# Patient Record
Sex: FEMALE | Race: WHITE | NOT HISPANIC OR LATINO | Employment: FULL TIME | URBAN - METROPOLITAN AREA
[De-identification: names, ages, dates, MRNs, and addresses within clinical notes are randomized per-mention and may not be internally consistent; named-entity substitution may affect disease eponyms.]

---

## 2023-03-24 ENCOUNTER — TELEPHONE (OUTPATIENT)
Dept: PRIMARY CARE | Facility: CLINIC | Age: 38
End: 2023-03-24
Payer: COMMERCIAL

## 2023-03-24 DIAGNOSIS — F41.8 DEPRESSION WITH ANXIETY: Primary | ICD-10-CM

## 2023-03-24 PROBLEM — G43.909 MIGRAINE: Status: ACTIVE | Noted: 2023-03-24

## 2023-03-24 PROBLEM — M12.9 ARTHROPATHY: Status: ACTIVE | Noted: 2023-03-24

## 2023-03-24 PROBLEM — R51.9 DAILY HEADACHE: Status: ACTIVE | Noted: 2023-03-24

## 2023-03-24 PROBLEM — R53.83 FATIGUE: Status: ACTIVE | Noted: 2023-03-24

## 2023-03-24 PROBLEM — I10 BENIGN ESSENTIAL HYPERTENSION: Status: ACTIVE | Noted: 2023-03-24

## 2023-03-24 PROBLEM — D68.51 FACTOR V LEIDEN (MULTI): Status: ACTIVE | Noted: 2023-03-24

## 2023-03-24 PROBLEM — E03.9 HYPOTHYROIDISM, ADULT: Status: ACTIVE | Noted: 2023-03-24

## 2023-03-24 RX ORDER — BUPROPION HYDROCHLORIDE 150 MG/1
TABLET, EXTENDED RELEASE ORAL 2 TIMES DAILY
COMMUNITY
End: 2024-02-15 | Stop reason: ALTCHOICE

## 2023-03-24 RX ORDER — AMITRIPTYLINE HYDROCHLORIDE 10 MG/1
TABLET, FILM COATED ORAL
COMMUNITY
Start: 2022-01-19 | End: 2023-04-27

## 2023-03-24 RX ORDER — LEVOTHYROXINE SODIUM 150 UG/1
1 TABLET ORAL DAILY
COMMUNITY
Start: 2017-04-19 | End: 2023-07-11 | Stop reason: SDUPTHER

## 2023-03-24 RX ORDER — SUMATRIPTAN SUCCINATE 100 MG/1
TABLET ORAL
COMMUNITY
Start: 2022-01-19

## 2023-03-24 RX ORDER — ESCITALOPRAM OXALATE 20 MG/1
20 TABLET ORAL DAILY
Qty: 30 TABLET | Refills: 5 | Status: SHIPPED | OUTPATIENT
Start: 2023-03-24 | End: 2023-07-11 | Stop reason: SDUPTHER

## 2023-03-24 RX ORDER — PROPRANOLOL HYDROCHLORIDE 20 MG/1
1 TABLET ORAL 2 TIMES DAILY
COMMUNITY
Start: 2020-09-01 | End: 2024-02-15 | Stop reason: WASHOUT

## 2023-03-24 NOTE — TELEPHONE ENCOUNTER
Patient called and stated that she is having more headaches and nausea since starting the citalopram - she would like to try something else- I will switch to escitalopram

## 2023-04-27 ENCOUNTER — TELEMEDICINE (OUTPATIENT)
Dept: PRIMARY CARE | Facility: CLINIC | Age: 38
End: 2023-04-27
Payer: COMMERCIAL

## 2023-04-27 DIAGNOSIS — K12.1 MOUTH ULCERS: Primary | ICD-10-CM

## 2023-04-27 DIAGNOSIS — J03.90 ACUTE TONSILLITIS, UNSPECIFIED ETIOLOGY: ICD-10-CM

## 2023-04-27 PROCEDURE — 99213 OFFICE O/P EST LOW 20 MIN: CPT | Performed by: NURSE PRACTITIONER

## 2023-04-27 RX ORDER — VALACYCLOVIR HYDROCHLORIDE 1 G/1
1000 TABLET, FILM COATED ORAL 3 TIMES DAILY
Qty: 9 TABLET | Refills: 0 | Status: SHIPPED | OUTPATIENT
Start: 2023-04-27 | End: 2023-04-30

## 2023-04-27 RX ORDER — PREDNISONE 20 MG/1
TABLET ORAL
Qty: 18 TABLET | Refills: 0 | Status: SHIPPED | OUTPATIENT
Start: 2023-04-27 | End: 2023-07-11 | Stop reason: ALTCHOICE

## 2023-04-27 RX ORDER — DOXYCYCLINE 100 MG/1
100 CAPSULE ORAL 2 TIMES DAILY
Qty: 28 CAPSULE | Refills: 0 | Status: SHIPPED | OUTPATIENT
Start: 2023-04-27 | End: 2023-05-11

## 2023-04-27 ASSESSMENT — ENCOUNTER SYMPTOMS
SHORTNESS OF BREATH: 0
TROUBLE SWALLOWING: 1
NERVOUS/ANXIOUS: 1
SORE THROAT: 1
FATIGUE: 1
PALPITATIONS: 0
HEADACHES: 1
ACTIVITY CHANGE: 1
FEVER: 1
CHEST TIGHTNESS: 1

## 2023-04-27 NOTE — PROGRESS NOTES
Subjective   Patient ID: Tiffanie Burdick is a 38 y.o. female who presents for No chief complaint on file..  HPI    She is calling in because she is feeling miserable   She started with and enlarged lymph node of the left side and the next day she felt that her tonsils were swollen and she hard a hard time swallowing   She continued to feel worse over the next few days   Multiple sores on the roof of her mouth    She thinks that she talked herself into a little panic attack went to the ER and all cardiac testing was normal   Then she went to urgent care and he told her they were ulcers and gave her valacyclovir    She is still pretty miserable with multiple sores on the tonsils and on the roof of her mouth         Review of Systems   Constitutional:  Positive for activity change, fatigue and fever.   HENT:  Positive for mouth sores, sore throat and trouble swallowing.    Respiratory:  Positive for chest tightness. Negative for shortness of breath.    Cardiovascular:  Positive for chest pain. Negative for palpitations and leg swelling.   Neurological:  Positive for headaches.   Psychiatric/Behavioral:  The patient is nervous/anxious.        Objective   Physical Exam  Constitutional:       Appearance: Normal appearance.   HENT:      Head: Normocephalic.      Mouth/Throat:      Mouth: Oral lesions present.      Palate: Lesions present.      Pharynx: Uvula midline.     Pulmonary:      Effort: Pulmonary effort is normal.      Breath sounds: Normal breath sounds.   Lymphadenopathy:      Head:      Left side of head: Submandibular adenopathy present.   Neurological:      Mental Status: She is alert.   Psychiatric:         Mood and Affect: Mood normal.      Comments: Somewhat anxious          Assessment/Plan   Mouth Sores  Continue with the Valacyclovir that you were given and I am also giving you a steroid dose   As your sinuses have been having a lot of pressure and you have some PND I am sending over an antibiotic

## 2023-07-11 DIAGNOSIS — E03.9 HYPOTHYROIDISM, ADULT: Primary | ICD-10-CM

## 2023-07-11 DIAGNOSIS — F41.8 DEPRESSION WITH ANXIETY: ICD-10-CM

## 2023-07-11 RX ORDER — LEVOTHYROXINE SODIUM 150 UG/1
150 TABLET ORAL DAILY
Qty: 90 TABLET | Refills: 3 | Status: SHIPPED | OUTPATIENT
Start: 2023-07-11 | End: 2024-02-26 | Stop reason: WASHOUT

## 2023-07-11 RX ORDER — ESCITALOPRAM OXALATE 20 MG/1
20 TABLET ORAL DAILY
Qty: 90 TABLET | Refills: 3 | Status: SHIPPED | OUTPATIENT
Start: 2023-07-11 | End: 2024-02-15 | Stop reason: WASHOUT

## 2023-07-13 DIAGNOSIS — B34.9 VIRAL SYNDROME: ICD-10-CM

## 2023-07-13 DIAGNOSIS — R21 FACIAL RASH: Primary | ICD-10-CM

## 2023-07-13 DIAGNOSIS — K13.79 MOUTH SORES: ICD-10-CM

## 2023-07-13 RX ORDER — PREDNISONE 10 MG/1
TABLET ORAL
Qty: 30 TABLET | Refills: 0 | Status: SHIPPED | OUTPATIENT
Start: 2023-07-13 | End: 2023-07-23

## 2023-07-13 RX ORDER — DOXYCYCLINE 100 MG/1
100 CAPSULE ORAL 2 TIMES DAILY
Qty: 28 CAPSULE | Refills: 0 | Status: SHIPPED | OUTPATIENT
Start: 2023-07-13 | End: 2023-07-27

## 2023-07-13 RX ORDER — VALACYCLOVIR HYDROCHLORIDE 1 G/1
1000 TABLET, FILM COATED ORAL 2 TIMES DAILY
COMMUNITY
Start: 2023-04-26 | End: 2023-07-13 | Stop reason: SDUPTHER

## 2023-07-13 RX ORDER — VALACYCLOVIR HYDROCHLORIDE 1 G/1
TABLET, FILM COATED ORAL
Qty: 20 TABLET | Refills: 1 | Status: SHIPPED | OUTPATIENT
Start: 2023-07-13 | End: 2024-02-15 | Stop reason: WASHOUT

## 2023-07-14 ENCOUNTER — LAB (OUTPATIENT)
Dept: LAB | Facility: LAB | Age: 38
End: 2023-07-14
Payer: COMMERCIAL

## 2023-07-14 DIAGNOSIS — B34.9 VIRAL SYNDROME: ICD-10-CM

## 2023-07-14 DIAGNOSIS — R21 FACIAL RASH: ICD-10-CM

## 2023-07-14 PROCEDURE — 86140 C-REACTIVE PROTEIN: CPT

## 2023-07-14 PROCEDURE — 86665 EPSTEIN-BARR CAPSID VCA: CPT

## 2023-07-14 PROCEDURE — 85652 RBC SED RATE AUTOMATED: CPT

## 2023-07-14 PROCEDURE — 86225 DNA ANTIBODY NATIVE: CPT

## 2023-07-14 PROCEDURE — 86038 ANTINUCLEAR ANTIBODIES: CPT

## 2023-07-14 PROCEDURE — 86235 NUCLEAR ANTIGEN ANTIBODY: CPT

## 2023-07-14 PROCEDURE — 86664 EPSTEIN-BARR NUCLEAR ANTIGEN: CPT

## 2023-07-14 PROCEDURE — 86663 EPSTEIN-BARR ANTIBODY: CPT

## 2023-07-14 PROCEDURE — 83615 LACTATE (LD) (LDH) ENZYME: CPT

## 2023-07-14 PROCEDURE — 36415 COLL VENOUS BLD VENIPUNCTURE: CPT

## 2023-07-14 PROCEDURE — 86039 ANTINUCLEAR ANTIBODIES (ANA): CPT

## 2023-07-15 LAB
C REACTIVE PROTEIN (MG/L) IN SER/PLAS: 0.4 MG/DL
EBV INTERPRETATION: ABNORMAL
EPSTEIN-BARR VCA IGG: POSITIVE
EPSTEIN-BARR VCA IGM: NEGATIVE
EPSTEIN-BARR VIRUS EARLY ANTIGEN ANTIBODY, IGG: NEGATIVE
EPSTIEN-BARR NUCLEAR ANTIGEN AB: POSITIVE
LACTATE DEHYDROGENASE (U/L) IN SER/PLAS BY LAC->PYR RXN: 190 U/L (ref 84–246)
SEDIMENTATION RATE, ERYTHROCYTE: 3 MM/H (ref 0–20)

## 2023-07-17 LAB
ANA PATTERN: ABNORMAL
ANA TITER: ABNORMAL
ANTI-CENTROMERE: <0.2 AI
ANTI-CHROMATIN: <0.2 AI
ANTI-DNA (DS): <1 IU/ML
ANTI-JO-1 IGG: <0.2 AI
ANTI-NUCLEAR ANTIBODY (ANA): POSITIVE
ANTI-RIBOSOMAL P: <0.2 AI
ANTI-RNP: 0.2 AI
ANTI-SCL-70: <0.2 AI
ANTI-SM/RNP: <0.2 AI
ANTI-SM: <0.2 AI
ANTI-SSA: <0.2 AI
ANTI-SSB: <0.2 AI

## 2023-07-17 NOTE — RESULT ENCOUNTER NOTE
ESPINOZA was reported as positive with a titer of 1:160. You have a normal sed rate. This is most likely a false positive, but if concerned about a rheumatological condition, we can refer to rheumatology. Let us know and will write order.

## 2023-07-17 NOTE — RESULT ENCOUNTER NOTE
Labwork back and labwork looked pretty good. Markers for inflammation negative, and it showed you had mono in the past. Otherwise nothing is abnormal.

## 2024-02-06 ENCOUNTER — APPOINTMENT (OUTPATIENT)
Dept: PRIMARY CARE | Facility: CLINIC | Age: 39
End: 2024-02-06
Payer: COMMERCIAL

## 2024-02-15 ENCOUNTER — HOSPITAL ENCOUNTER (OUTPATIENT)
Dept: RADIOLOGY | Facility: CLINIC | Age: 39
Discharge: HOME | End: 2024-02-15
Payer: COMMERCIAL

## 2024-02-15 ENCOUNTER — OFFICE VISIT (OUTPATIENT)
Dept: PRIMARY CARE | Facility: CLINIC | Age: 39
End: 2024-02-15
Payer: COMMERCIAL

## 2024-02-15 VITALS
HEART RATE: 84 BPM | TEMPERATURE: 98.2 F | DIASTOLIC BLOOD PRESSURE: 78 MMHG | HEIGHT: 67 IN | SYSTOLIC BLOOD PRESSURE: 126 MMHG | OXYGEN SATURATION: 96 % | BODY MASS INDEX: 41.75 KG/M2 | WEIGHT: 266 LBS

## 2024-02-15 DIAGNOSIS — Z76.89 ENCOUNTER TO ESTABLISH CARE WITH NEW DOCTOR: ICD-10-CM

## 2024-02-15 DIAGNOSIS — R05.2 SUBACUTE COUGH: ICD-10-CM

## 2024-02-15 DIAGNOSIS — E66.01 MORBID OBESITY (MULTI): ICD-10-CM

## 2024-02-15 DIAGNOSIS — E03.9 HYPOTHYROIDISM, UNSPECIFIED TYPE: ICD-10-CM

## 2024-02-15 DIAGNOSIS — Z11.3 ROUTINE SCREENING FOR STI (SEXUALLY TRANSMITTED INFECTION): Primary | ICD-10-CM

## 2024-02-15 DIAGNOSIS — Z13.1 DIABETES MELLITUS SCREENING: ICD-10-CM

## 2024-02-15 DIAGNOSIS — Z13.220 NEED FOR LIPID SCREENING: ICD-10-CM

## 2024-02-15 DIAGNOSIS — I10 BENIGN ESSENTIAL HYPERTENSION: ICD-10-CM

## 2024-02-15 PROCEDURE — 71046 X-RAY EXAM CHEST 2 VIEWS: CPT | Performed by: RADIOLOGY

## 2024-02-15 PROCEDURE — 99204 OFFICE O/P NEW MOD 45 MIN: CPT | Performed by: STUDENT IN AN ORGANIZED HEALTH CARE EDUCATION/TRAINING PROGRAM

## 2024-02-15 PROCEDURE — 3074F SYST BP LT 130 MM HG: CPT | Performed by: STUDENT IN AN ORGANIZED HEALTH CARE EDUCATION/TRAINING PROGRAM

## 2024-02-15 PROCEDURE — 99385 PREV VISIT NEW AGE 18-39: CPT | Performed by: STUDENT IN AN ORGANIZED HEALTH CARE EDUCATION/TRAINING PROGRAM

## 2024-02-15 PROCEDURE — 1036F TOBACCO NON-USER: CPT | Performed by: STUDENT IN AN ORGANIZED HEALTH CARE EDUCATION/TRAINING PROGRAM

## 2024-02-15 PROCEDURE — 3078F DIAST BP <80 MM HG: CPT | Performed by: STUDENT IN AN ORGANIZED HEALTH CARE EDUCATION/TRAINING PROGRAM

## 2024-02-15 PROCEDURE — 71046 X-RAY EXAM CHEST 2 VIEWS: CPT

## 2024-02-15 RX ORDER — LOSARTAN POTASSIUM 25 MG/1
25 TABLET ORAL DAILY
Qty: 30 TABLET | Refills: 0 | Status: SHIPPED | OUTPATIENT
Start: 2024-02-15 | End: 2024-04-03 | Stop reason: SDUPTHER

## 2024-02-15 ASSESSMENT — PATIENT HEALTH QUESTIONNAIRE - PHQ9
SUM OF ALL RESPONSES TO PHQ9 QUESTIONS 1 AND 2: 0
1. LITTLE INTEREST OR PLEASURE IN DOING THINGS: NOT AT ALL
2. FEELING DOWN, DEPRESSED OR HOPELESS: NOT AT ALL

## 2024-02-15 NOTE — PROGRESS NOTES
Tiffanie Burdick is a 38 y.o. female who is presenting for annual physical exam.   She is complaining about a cough with sputum(greenish color).  The cough has been going on for the past 4 weeks.  She had runny nose tiredness, wheezing at night, but denied shortness of breath.  Tenderness of the of the chest especially with deep breath and and is on the right side.   She has a close contact with sick person, her kids got tested positive for flu B.  Last  Visit:   Reported Health: Fair    Dental, Vision, Hearing:  Regular dental visits: yes  - Brushes teeth 2 times per day  - Flosses? yes  Vision problems: no  - Wears glasses or contacts? no  - Last eye exam: 2023  Hearing loss: no    Immunization status:  Up to date: yes    Lifestyle:  Healthy diet: yes  Regular exercise: yes  - Exercise frequency:   - Type of exercise: fit on bhaskar  Alcohol: no  Tobacco: no  Drugs: no    Reproductive Health:  Menstrual problems: no  - LMP: 24  Sexually active: yes  Contraception: vasectomy    Pregnancy History:   : 6  Parity:   - Full term: 6  - Premature:  - (s):  - Living:  - AB Induced:  - AB Spont:1  - Ectopic:   - Multiple births:    Cervical Cancer Screening:  Last pap: last year  History of abnormal pap smear? no  Breast Cancer Screening:  Last mammogram: no yet  Colorectal Cancer Screening:  Last colonoscopy or Cologuard: no at the age and no fhx of colon cancer  Metabolic Screening:  Lipid profile: january  Glucose screen: 2024    Review of Systems  Gen: denies fever, chills, weight loss, fatigue  HEENT: denies sinus pressure, sinus congestion, runny nose, red eyes, itchy eyes, vision loss, ear pain, hearing loss, throat pain, trouble swallowing  Neck: denies neck pain, neck swelling or masses  Chest/breast: denies breast pain, breast lumps, nipple discharge  CV: denies chest pain, palpitations, fast heart rate, syncope  Resp: denies shortness of breath, cough, wheezing  GI: denies  abdominal pain, nausea, diarrhea, constipation, hematochezia, melena  : denies dysuria, hematuria, vaginal/penile discharge, frequency  Endo: denies polydipsia, polyuria, heat/cold intolerance, weight change, hair thinning  Heme: denies easy bruising, easy bleeding  Neuro: denies headache, numbness, tingling, memory loss, changes in vision  MSK: denies joint pain, joint swelling, weakness  Psych: denies suicidal ideation, homicidal ideation, depression, anxiety, mood swings  Skin: denies rashes, abnormal lesions, itching, changes in moles     Previous History  Past Medical History:   Diagnosis Date    Personal history of other diseases of the nervous system and sense organs     History of migraine     Past Surgical History:   Procedure Laterality Date    OTHER SURGICAL HISTORY  04/19/2017    Cholecystotomy    OTHER SURGICAL HISTORY  03/07/2022    Appendectomy    OTHER SURGICAL HISTORY  03/07/2022    Kidney surgery     Social History     Tobacco Use    Smoking status: Never    Smokeless tobacco: Never     No family history on file.  Allergies   Allergen Reactions    Amitriptyline Confusion    Ampicillin Hives    Reglan [Metoclopramide Hcl] Confusion     Current Outpatient Medications   Medication Instructions    levothyroxine (SYNTHROID) 150 mcg, oral, Daily    propranolol (Inderal) 20 mg tablet 1 tablet, oral, 2 times daily    SUMAtriptan (Imitrex) 100 mg tablet oral    valACYclovir (Valtrex) 1 gram tablet Take 1 tablet two times daily -  by 12 hours and will full glass of water at first sign for 1 day       Physical Exam  General: Alert and oriented, in no acute distress. Appears stated age, overweight, well-nourished, and well hydrated  HEENT:  - Head: Normocephalic and atraumatic   - Eyes: EOMI, PERRLA  - ENT: Hearing grossly intact. Mucus membranes pink and moist without lesions. Tonsils present without swelling or exudates. Good dentition. TMs gray  Neck: Supple. No stiffness. No thyromegaly or  thyroid nodules  Heart: RRR. No murmurs, clicks, or rubs  Lungs: Unlabored breathing. CTAB with no crackles, wheezes, or rhonchi  Abdomen: Normal BS in all 4 quadrants. Soft, non-tender, non-distended, with no masses  Extremities: Warm and well perfused. No edema. Normal peripheral pulses  Musculoskeletal: ROM intact. Strength 5/5 in BUE and BLE. No joint swelling. Normal gait and station  Neurological: Alert and oriented. No gross neurological deficits. Normal sensation. No weakness. DTRs +2/4   Psychological: Appropriate mood and affect  Skin: No rash, abnormal lesions, cyanosis, or erythema      Assessment and Plan   38 years old female with past medical history of hypertensive, hypothyroidism, migraine who presented today to the office for establish care with a new provider    Annual Physical  -She is up-to-date with her immunization, last Tdap was in 2016  -Order labs CBC, CMP, TSH, A1c lipid panel  -She is up-to-date with Pap smear      # Cough due to pleuritis, bronchitis pneumonia  -patient had prednisone and flonase which she mention is a little better but not resolved.  -Order chest x-ray  -If chest x-ray is normal supportive treatment and follow-up would be recommendation    #hypothyroidism   -Has been taking half of the tablet 150 mcg(so 75 mcg) for the past 2 weeks  -ordered TSH  -Based on the TSH result we will give the medication    # Hypertension  -Patient is taking propranolol for her blood pressure.  -Discontinue propranolol and start losartan 25 mg    # Overweight  -BMI 41  -Patient has been trying diet and exercise in the past but did not work for her  -Bariatric surgery referral    RTC within 4 weeks for her blood pressure and reassess the cough.    I discussed my plan with my attending, Dr. Gonzalez.    Roxane Bang  Family medicine resident  PGY2

## 2024-02-16 ENCOUNTER — APPOINTMENT (OUTPATIENT)
Dept: PRIMARY CARE | Facility: CLINIC | Age: 39
End: 2024-02-16
Payer: COMMERCIAL

## 2024-02-23 ENCOUNTER — LAB (OUTPATIENT)
Dept: LAB | Facility: LAB | Age: 39
End: 2024-02-23
Payer: COMMERCIAL

## 2024-02-23 DIAGNOSIS — Z13.220 NEED FOR LIPID SCREENING: ICD-10-CM

## 2024-02-23 DIAGNOSIS — Z11.3 ROUTINE SCREENING FOR STI (SEXUALLY TRANSMITTED INFECTION): ICD-10-CM

## 2024-02-23 DIAGNOSIS — Z13.1 DIABETES MELLITUS SCREENING: ICD-10-CM

## 2024-02-23 DIAGNOSIS — Z76.89 ENCOUNTER TO ESTABLISH CARE WITH NEW DOCTOR: ICD-10-CM

## 2024-02-23 DIAGNOSIS — E03.9 HYPOTHYROIDISM, UNSPECIFIED TYPE: ICD-10-CM

## 2024-02-23 LAB
ALBUMIN SERPL BCP-MCNC: 4.5 G/DL (ref 3.4–5)
ALP SERPL-CCNC: 45 U/L (ref 33–110)
ALT SERPL W P-5'-P-CCNC: 24 U/L (ref 7–45)
ANION GAP SERPL CALC-SCNC: 12 MMOL/L (ref 10–20)
AST SERPL W P-5'-P-CCNC: 20 U/L (ref 9–39)
BILIRUB SERPL-MCNC: 0.7 MG/DL (ref 0–1.2)
BUN SERPL-MCNC: 11 MG/DL (ref 6–23)
CALCIUM SERPL-MCNC: 9.2 MG/DL (ref 8.6–10.3)
CHLORIDE SERPL-SCNC: 104 MMOL/L (ref 98–107)
CHOLEST SERPL-MCNC: 164 MG/DL (ref 0–199)
CHOLESTEROL/HDL RATIO: 3.4
CO2 SERPL-SCNC: 27 MMOL/L (ref 21–32)
CREAT SERPL-MCNC: 0.81 MG/DL (ref 0.5–1.05)
EGFRCR SERPLBLD CKD-EPI 2021: >90 ML/MIN/1.73M*2
ERYTHROCYTE [DISTWIDTH] IN BLOOD BY AUTOMATED COUNT: 12.3 % (ref 11.5–14.5)
EST. AVERAGE GLUCOSE BLD GHB EST-MCNC: 91 MG/DL
GLUCOSE SERPL-MCNC: 86 MG/DL (ref 74–99)
HBA1C MFR BLD: 4.8 %
HCT VFR BLD AUTO: 45.3 % (ref 36–46)
HCV AB SER QL: NONREACTIVE
HDLC SERPL-MCNC: 48.2 MG/DL
HGB BLD-MCNC: 15.1 G/DL (ref 12–16)
HIV 1+2 AB+HIV1 P24 AG SERPL QL IA: NONREACTIVE
LDLC SERPL CALC-MCNC: 90 MG/DL
MCH RBC QN AUTO: 30.8 PG (ref 26–34)
MCHC RBC AUTO-ENTMCNC: 33.3 G/DL (ref 32–36)
MCV RBC AUTO: 92 FL (ref 80–100)
NON HDL CHOLESTEROL: 116 MG/DL (ref 0–149)
NRBC BLD-RTO: 0 /100 WBCS (ref 0–0)
PLATELET # BLD AUTO: 268 X10*3/UL (ref 150–450)
POTASSIUM SERPL-SCNC: 4.5 MMOL/L (ref 3.5–5.3)
PROT SERPL-MCNC: 6.8 G/DL (ref 6.4–8.2)
RBC # BLD AUTO: 4.9 X10*6/UL (ref 4–5.2)
SODIUM SERPL-SCNC: 138 MMOL/L (ref 136–145)
TRIGL SERPL-MCNC: 130 MG/DL (ref 0–149)
TSH SERPL-ACNC: 7.77 MIU/L (ref 0.44–3.98)
VLDL: 26 MG/DL (ref 0–40)
WBC # BLD AUTO: 7 X10*3/UL (ref 4.4–11.3)

## 2024-02-23 PROCEDURE — 85027 COMPLETE CBC AUTOMATED: CPT

## 2024-02-23 PROCEDURE — 87389 HIV-1 AG W/HIV-1&-2 AB AG IA: CPT

## 2024-02-23 PROCEDURE — 80053 COMPREHEN METABOLIC PANEL: CPT

## 2024-02-23 PROCEDURE — 84443 ASSAY THYROID STIM HORMONE: CPT

## 2024-02-23 PROCEDURE — 36415 COLL VENOUS BLD VENIPUNCTURE: CPT

## 2024-02-23 PROCEDURE — 80061 LIPID PANEL: CPT

## 2024-02-23 PROCEDURE — 86803 HEPATITIS C AB TEST: CPT

## 2024-02-23 PROCEDURE — 83036 HEMOGLOBIN GLYCOSYLATED A1C: CPT

## 2024-02-25 DIAGNOSIS — E03.9 HYPOTHYROIDISM, UNSPECIFIED TYPE: Primary | ICD-10-CM

## 2024-02-26 ENCOUNTER — TELEPHONE (OUTPATIENT)
Dept: PRIMARY CARE | Facility: CLINIC | Age: 39
End: 2024-02-26
Payer: COMMERCIAL

## 2024-02-26 RX ORDER — LEVOTHYROXINE SODIUM 75 UG/1
75 TABLET ORAL DAILY
Qty: 60 TABLET | Refills: 0 | Status: SHIPPED | OUTPATIENT
Start: 2024-02-26 | End: 2024-04-05 | Stop reason: WASHOUT

## 2024-02-26 NOTE — TELEPHONE ENCOUNTER
Result Communication    Resulted Orders   CBC   Result Value Ref Range    WBC 7.0 4.4 - 11.3 x10*3/uL    nRBC 0.0 0.0 - 0.0 /100 WBCs    RBC 4.90 4.00 - 5.20 x10*6/uL    Hemoglobin 15.1 12.0 - 16.0 g/dL    Hematocrit 45.3 36.0 - 46.0 %    MCV 92 80 - 100 fL    MCH 30.8 26.0 - 34.0 pg    MCHC 33.3 32.0 - 36.0 g/dL    RDW 12.3 11.5 - 14.5 %    Platelets 268 150 - 450 x10*3/uL   Comprehensive Metabolic Panel   Result Value Ref Range    Glucose 86 74 - 99 mg/dL    Sodium 138 136 - 145 mmol/L    Potassium 4.5 3.5 - 5.3 mmol/L    Chloride 104 98 - 107 mmol/L    Bicarbonate 27 21 - 32 mmol/L    Anion Gap 12 10 - 20 mmol/L    Urea Nitrogen 11 6 - 23 mg/dL    Creatinine 0.81 0.50 - 1.05 mg/dL    eGFR >90 >60 mL/min/1.73m*2      Comment:      Calculations of estimated GFR are performed using the 2021 CKD-EPI Study Refit equation without the race variable for the IDMS-Traceable creatinine methods.  https://jasn.asnjournals.org/content/early/2021/09/22/ASN.6827447725    Calcium 9.2 8.6 - 10.3 mg/dL    Albumin 4.5 3.4 - 5.0 g/dL    Alkaline Phosphatase 45 33 - 110 U/L    Total Protein 6.8 6.4 - 8.2 g/dL    AST 20 9 - 39 U/L    Bilirubin, Total 0.7 0.0 - 1.2 mg/dL    ALT 24 7 - 45 U/L      Comment:      Patients treated with Sulfasalazine may generate falsely decreased results for ALT.   Lipid Panel   Result Value Ref Range    Cholesterol 164 0 - 199 mg/dL      Comment:            Age      Desirable   Borderline High   High     0-19 Y     0 - 169       170 - 199     >/= 200    20-24 Y     0 - 189       190 - 224     >/= 225         >24 Y     0 - 199       200 - 239     >/= 240   **All ranges are based on fasting samples. Specific   therapeutic targets will vary based on patient-specific   cardiac risk.    Pediatric guidelines reference:Pediatrics 2011, 128(S5).Adult guidelines reference: NCEP ATPIII Guidelines,YAZ 2001, 258:2486-97    Venipuncture immediately after or during the administration of Metamizole may lead to falsely  low results. Testing should be performed immediately prior to Metamizole dosing.    HDL-Cholesterol 48.2 mg/dL      Comment:        Age       Very Low   Low     Normal    High    0-19 Y    < 35      < 40     40-45     ----  20-24 Y    ----     < 40      >45      ----        >24 Y      ----     < 40     40-60      >60      Cholesterol/HDL Ratio 3.4       Comment:        Ref Values  Desirable  < 3.4  High Risk  > 5.0    LDL Calculated 90 <=99 mg/dL      Comment:                                  Near   Borderline      AGE      Desirable  Optimal    High     High     Very High     0-19 Y     0 - 109     ---    110-129   >/= 130     ----    20-24 Y     0 - 119     ---    120-159   >/= 160     ----      >24 Y     0 -  99   100-129  130-159   160-189     >/=190      VLDL 26 0 - 40 mg/dL    Triglycerides 130 0 - 149 mg/dL      Comment:         Age         Desirable   Borderline High   High     Very High   0 D-90 D    19 - 174         ----         ----        ----  91 D- 9 Y     0 -  74        75 -  99     >/= 100      ----    10-19 Y     0 -  89        90 - 129     >/= 130      ----    20-24 Y     0 - 114       115 - 149     >/= 150      ----         >24 Y     0 - 149       150 - 199    200- 499    >/= 500    Venipuncture immediately after or during the administration of Metamizole may lead to falsely low results. Testing should be performed immediately prior to Metamizole dosing.    Non HDL Cholesterol 116 0 - 149 mg/dL      Comment:            Age       Desirable   Borderline High   High     Very High     0-19 Y     0 - 119       120 - 144     >/= 145    >/= 160    20-24 Y     0 - 149       150 - 189     >/= 190      ----         >24 Y    30 mg/dL above LDL Cholesterol goal     Hepatitis C Antibody   Result Value Ref Range    Hepatitis C AB Nonreactive Nonreactive      Comment:      Results from patients taking biotin supplements or receiving high-dose biotin therapy should be interpreted with caution due to possible  interference with this test. Providers may contact their local laboratory for further information.   HIV 1/2 Antigen/Antibody Screen with Reflex to Confirmation   Result Value Ref Range    HIV 1/2 Antigen/Antibody Screen with Reflex to Confirmation Nonreactive Nonreactive    Narrative    HIV Ag/Ab screen is performed using the Siemens [employer] Medical HIV Ag/Ab Combo assay which detects the presence of HIV p24 antigen as well as antibodies to HIV-1 (Group M and O) and HIV-2.  No laboratory evidence of HIV infection. If acute HIV infection is suspected, consider testing for HIV RNA by PCR (viral load).   Thyroid Stimulating Hormone   Result Value Ref Range    Thyroid Stimulating Hormone 7.77 (H) 0.44 - 3.98 mIU/L    Narrative    TSH testing is performed using different testing methodology at [facility] than at other [facility]. Direct result comparisons should only be made within the same method.     Hemoglobin A1C   Result Value Ref Range    Hemoglobin A1C 4.8 see below %    Estimated Average Glucose 91 Not Established mg/dL    Narrative    Diagnosis of Diabetes-Adults  Non-Diabetic: < or = 5.6%  Increased risk for developing diabetes: 5.7-6.4%  Diagnostic of diabetes: > or = 6.5%    Monitoring of Diabetes  Age (y)....................... Therapeutic Goal (%)  Adults: >18.........................<7.0  Pediatrics: 13-18...................<7.5  Pediatrics: 7-12....................<8.0  Pediatrics: 0-6..................... 7.5-8.5    American Diabetes Association. Diabetes Care 33(S1), Jan 2010           10:27 AM    Discussed results with patient. TSH is elevated. She admits to having only taken Synthroid 75mcg for about two weeks prior, therefore it is difficult to gauge if the 75mcg is the proper dose. She does have a history of having too much Synthroid one year ago. After extensive discussion, patient is agreeable to restarting Synthroid 75mcg for 6 weeks and recheck TSH at that time. If TSH is still  elevated, we will slowly titrate dose to avoid putting patient in hyperthyroid state.    Results were successfully communicated with the patient and they acknowledged their understanding.    Yaw Gonzalez MD

## 2024-02-28 ENCOUNTER — TELEPHONE (OUTPATIENT)
Dept: SURGERY | Facility: CLINIC | Age: 39
End: 2024-02-28
Payer: COMMERCIAL

## 2024-02-28 NOTE — TELEPHONE ENCOUNTER
Patient returned RN's call in regards to forms needing filled out to begin the bariatric process. Patient stated that she filled them out in October, but would be willing to fill out again now. RN educated patient to go ahead and fill out the forms and the insurance verification team would be made aware to look for her forms. Patient verbalized understanding and stated that she was grateful the someone reached out.

## 2024-02-28 NOTE — TELEPHONE ENCOUNTER
This RN received message from Dr. Kwok stating that patient has not been contacted about scheduling for bariatric surgery. RN was unable to find questionnaire that should be filled out prior to insurance verification. RN left voicemail for patient to call RN back to go over how to find questionnaire.

## 2024-02-28 NOTE — TELEPHONE ENCOUNTER
Hi,   She reached out to Dr. Kwok via Facebook messenger and asked why she hadn't heard back yet. Dr. Kwok brought it to Padmini and my attention. That is when I called the patient and she said that she would fill out the form online for the bariatric program.

## 2024-02-29 ENCOUNTER — TELEPHONE (OUTPATIENT)
Dept: SURGERY | Facility: CLINIC | Age: 39
End: 2024-02-29
Payer: COMMERCIAL

## 2024-02-29 NOTE — TELEPHONE ENCOUNTER
Spoke to [employer] 2/28 Spoke to Geovanny Ref # [id number]- While doing insurance verification:   Question  Is [employer] is required: answer was yes  Asked if each facility was covered under [employer]- NPI # given: answer yes  Checked if Vish Israel, Rissa Klein- gave NPI #: answer yes  Patient scheduled 3/6 for Virtual NPV with RD                                3/20 Dr Kwok @ 2pm  Class at 12- Patient aware

## 2024-03-06 ENCOUNTER — NUTRITION (OUTPATIENT)
Dept: SURGERY | Facility: CLINIC | Age: 39
End: 2024-03-06
Payer: COMMERCIAL

## 2024-03-06 VITALS — HEIGHT: 67 IN | WEIGHT: 264 LBS | BODY MASS INDEX: 41.44 KG/M2

## 2024-03-06 NOTE — PROGRESS NOTES
"Initial Bariatric Nutrition Assessment    Surgeon:  Rissa   Patient is considering: Sleeve gastrectomy     ASSESSMENT:  Current weight:   Vitals:    03/06/24 1504   Weight: 120 kg (264 lb)     Ht:  1.702 m (5' 7\")   BMI:  Body mass index is 41.35 kg/m².        Pre-Op Excess Body Weight (EBW):   107lbs    Target Post-Op weight goal: 194.5-210.5lbs        This is a 39 y.o. female with morbid obesity (Body mass index is 41.35 kg/m².) who presents to clinic for consideration of bariatric surgery. she has attempted and failed multiple diet and exercise regimens for weight loss.   Initial Onset of obesity was  after her 6th pregnancy .  Their goal for surgery is to  be healthier  and lose weight. The patient has tried multiple diets to lose weight including  trim healthy mama, WW, exercise, portion control . The patient was most successful with the  trim healthy mama . The most pounds lost on this diet were 45 lbs. The patient considers their dietary weakness to be  feeling hungry all the time, fear of missing out with food, eating more than the portion size.  The patient reports a  highest weight ever of 264 pounds and lowest weight ever of ~150 pounds     Current diet: regular   The patient does not exercise.     Food allergies/intolerances:  no  Chewing/Swallowing/Dentition: no  Nausea / Vomiting / Hx Gastroparesis:  no  Diarrhea/ Constipation: no  Smoking/Tobacco use: no  Vitamins/Minerals supplements: no  Hours of sleep/night: 7 hours   Work: day shift     Medications:     Current Outpatient Medications:     levothyroxine (Synthroid, Levoxyl) 75 mcg tablet, Take 1 tablet (75 mcg) by mouth once daily., Disp: 60 tablet, Rfl: 0    losartan (Cozaar) 25 mg tablet, Take 1 tablet (25 mg) by mouth once daily., Disp: 30 tablet, Rfl: 0    SUMAtriptan (Imitrex) 100 mg tablet, Take by mouth., Disp: , Rfl:       24 HOUR RECALL/DIET HISTORY:  Breakfast:  premier shake or nothing   Snack:    Lunch: sandwich (PB and J) or leftover " dinner   Snack:   Dinner: meat potato and a veggie  Snack: pretzels or fruit  Beverages: coffee (trying to get off it), tea, pop (2x per week), carbonated water and lemon   Alcohol: special occasions     Person responsible for cooking & shopping?   Self   How often do you eat sweet snacks?   5x per week  How often do you eat savory snacks?  2-3x per week   How often do you eat out?  1x per week  Do you feel overly stuffed? yes  Binge Eating?  No- will monitor this for sure  Night Eating?  no  Emotional Eating?  Yes        READINESS TO LEARN:  Motivation to learn: Interested        Understanding of instruction:  Good      Anticipated Compliance: Good        Family Support: yes,  and parents           Educational Materials Provided:    Schedules for support group   1400  Calorie meal plan   Goals sheet    Nutrition assessment completed today.  Pt will be scheduled for video education class to discuss the 2 week pre op diet, post op protein and fluid goals, vitamin and mineral supplementation, exercise goals, and post op diet progression closer to the time of surgery    Patient is seeking sleeve gastrectomy    Instructed pt on a 1400 calorie meal plan and to measure and record intake daily. Advised to eat 3 meals per and 1-2 high protein snacks.  Recommend eating 3-4 oz per meal. Reviewed the postop behaviors to start practicing. Discussed ways to reduce emotional eating.  Set a goal to start doing exercise of choices for 3x per week for 30 minutes.     Patient was receptive to nutritional recommendations, asked numerous questions, and verbalized understanding of the weight loss surgery diet.  Patient expressed understanding about the importance of strict dietary compliance post-surgery to avoid nutritional deficiencies and achieve optimal weight loss and verbalized intent to follow dietary recommendations.    Malnutrition Screening:   Significant unintentional weight loss? n/a   Eating less than 75% of usual  intake for more than 2 weeks? n/a      Nutrition Diagnosis:   Overweight/obesity related to excess energy intake as evidenced by BMI >= 40 kg/m^2.  Food- and nutrition-related knowledge deficit related to lack of prior exposure to surgical weight loss information as evidenced by pt new to surgical program.    Nutrition Interventions:   Modify type and amount of food and nutrients within meals and snacks.  Comprehensive Nutrition Education    Recommendations:  1. Begin following your meal plan.  Measure and record intake daily.   2. Structure meal patterns, eating three meals and 1-2 snacks per day.  3. Aim for 3-4 oz protein per meal.  Have 1-2 high protein snacks that are 10-20 g protein each.  You can try a tuna or chicken packet, Greek yogurt, 2 string cheeses, Protein bars like Quest, Pure Protein, Premier, or Built Bars. you can also try protein chips form Quest or Atkins.    4. Drink 64oz of calorie-free, caffeine-free, and non-carbonated beverages. Practice taking sips and avoid straws.   5. Practice no drinking 30 minutes before meals, nothing with meals and wait 30 minutes after meals to drink again. Make meals last 30 minutes-chew thoroughly.   6. Limit or omit eating out/sweets/savory snacks to 1-2 times per week.  7. Begin daily multivitamin.  [employer] adult has everything you need.  8. Begin using your gym or exercise videos 3x per week 30 minutes. Increase physical activity by 10-15 minutes as tolerated to an end goal of 60 minutes 5 x per week. Consistency is the key.  9. Identify emotional eating. Only snack when you feel hungry.     Pre-op Goal weight: lose 5% of body weight    Nutrition Monitoring and Evaluation: 1-2 pound weight loss per week  Criteria: weight check  Need for Follow-up: one month     Patient does meet National Institutes Health guidelines for weight loss surgery, however needs to demonstrate consistent effort in making dietary changes before giving clearance. It is anticipated that  the patient will need at least 1-2 nutritional follow-up visits prior to clearance for surgery.    She Allen MS, RD, LD  Phone: [phone]

## 2024-03-19 ENCOUNTER — TELEPHONE (OUTPATIENT)
Dept: SURGERY | Facility: CLINIC | Age: 39
End: 2024-03-19
Payer: COMMERCIAL

## 2024-03-19 NOTE — TELEPHONE ENCOUNTER
Steve Moreno, just left you  message to confirm your scheduled visit with Dr. Kwok on 3/20/24 at [facility] (inside [facility], main floor in the Specialty Clinic).  Please ARRIVE AT:  11:45 AM for the required Noon to 1PM New Patient Class led by Dr. Kwok and Jessica RN Coordinator.   Parking is available at a cost of $5.00 at the Main Entrance.  We look forward to seeing you, Amrita Talamantes, [facility] Nurse.

## 2024-03-20 ENCOUNTER — TELEPHONE (OUTPATIENT)
Dept: SURGERY | Facility: CLINIC | Age: 39
End: 2024-03-20

## 2024-03-20 ENCOUNTER — OFFICE VISIT (OUTPATIENT)
Dept: SURGERY | Facility: CLINIC | Age: 39
End: 2024-03-20
Payer: COMMERCIAL

## 2024-03-20 ENCOUNTER — DOCUMENTATION (OUTPATIENT)
Dept: SURGERY | Facility: CLINIC | Age: 39
End: 2024-03-20

## 2024-03-20 VITALS
HEART RATE: 100 BPM | WEIGHT: 263.6 LBS | DIASTOLIC BLOOD PRESSURE: 87 MMHG | OXYGEN SATURATION: 96 % | BODY MASS INDEX: 41.37 KG/M2 | SYSTOLIC BLOOD PRESSURE: 128 MMHG | HEIGHT: 67 IN

## 2024-03-20 DIAGNOSIS — I10 BENIGN ESSENTIAL HYPERTENSION: ICD-10-CM

## 2024-03-20 DIAGNOSIS — G43.909 MIGRAINE WITHOUT STATUS MIGRAINOSUS, NOT INTRACTABLE, UNSPECIFIED MIGRAINE TYPE: ICD-10-CM

## 2024-03-20 DIAGNOSIS — E03.9 HYPOTHYROIDISM, ADULT: ICD-10-CM

## 2024-03-20 DIAGNOSIS — D68.51 FACTOR V LEIDEN (MULTI): ICD-10-CM

## 2024-03-20 DIAGNOSIS — F41.8 DEPRESSION WITH ANXIETY: ICD-10-CM

## 2024-03-20 DIAGNOSIS — E66.01 MORBID OBESITY (MULTI): Primary | ICD-10-CM

## 2024-03-20 PROCEDURE — 3074F SYST BP LT 130 MM HG: CPT | Performed by: SURGERY

## 2024-03-20 PROCEDURE — 99205 OFFICE O/P NEW HI 60 MIN: CPT | Performed by: SURGERY

## 2024-03-20 PROCEDURE — 1036F TOBACCO NON-USER: CPT | Performed by: SURGERY

## 2024-03-20 PROCEDURE — 3079F DIAST BP 80-89 MM HG: CPT | Performed by: SURGERY

## 2024-03-20 RX ORDER — MULTIVIT-MIN/IRON FUM/FOLIC AC 7.5 MG-4
1 TABLET ORAL DAILY
COMMUNITY

## 2024-03-20 NOTE — PROGRESS NOTES
BARIATRIC SURGERY NEW PATIENT CONSULTATION  HISTORY AND PHYSICAL      Date: 3/20/2024 Time: 1:41 PM  Name: Tiffanie Burdick  MRN: [id number]    This is a 39 y.o. female with morbid obesity (Body mass index is 41.29 kg/m².) who presents to clinic for consideration of bariatric surgery. she has attempted and failed multiple diet and exercise regimens for weight loss.     PMH:   Benign essential hypertension  On one med    Factor V Leiden (CMS/HCC)  She doesn't take systemic anticoag. She did take lovenox shots for the last month of her pregnancy.     Hypothyroidism, adult  On synthroid.    Depression with anxiety  Not on meds. She does follow up with a therapist.     Migraine  Associated with periods and ovulation - gets them twice a month. Takes excedrin to prevent. Sometimes imitrex works.      PSH: lap appy, lap ismael, ureteral stent.     Denies smoking/vaping/regular EtOH/drug use.   STOPBANG 6 (+ snoring, obesity, witnessed apnea, neck circum, daytime sleepiness, HTN).   No personal hx of VTE. Her brother also has Factor 5 Leiden and has had VTE in the past.     The risks of sleeve gastrectomy, Conor-en-Y gastric bypass, and duodenal switch surgery including bleeding, leak, wound infection, dehydration, ulcers, internal hernia, DVT/PE, prolonged nausea/vomiting, incomplete resolution of associated medical conditions, reflux, weight regain, vitamin/mineral deficiencies, and death have been explained to the patient and Tiffanie Burdick has expressed understanding and acceptance of them.     The increased risk of substance and alcohol abuse following bariatric surgery was discussed with the patient, along with the negative consequences of substance/alcohol use after surgery including addiction, worsening of mental health disorders, and injury to the stomach. The risk of smoking and vaping (tobacco or any other substance) after bariatric surgery was explained to the patient. This includes risk of anastamotic ulcers,  gastritis, bleeding, perforation, stricture, and PO intolerance.  The patient expressed understanding and acceptance of these risks.    The patient was advised not to become pregnant within 12-18 months following bariatric surgery. She was educated on the increased risks to mother and fetus associated with pregnancy within 2 years of bariatric surgery. Her  had a vasectomy.     The benefits of the above surgeries including weight loss, improvement/resolution of associated medical and mental health conditions, improved mobility, and decreased mortality have been explained the the patient and Tiffanie Burdick has expressed understanding and acceptance of them.    PAST MEDICAL HISTORY:  Problem List Items Addressed This Visit       Benign essential hypertension - Primary    Current Assessment & Plan     On one med         Depression with anxiety    Current Assessment & Plan     Not on meds. She does follow up with a therapist.          Factor V Leiden (CMS/HCC)    Current Assessment & Plan     She doesn't take systemic anticoag. She did take lovenox shots for the last month of her pregnancy.          Hypothyroidism, adult    Current Assessment & Plan     On synthroid.         Migraine    Current Assessment & Plan     Associated with periods and ovulation - gets them twice a month. Takes excedrin to prevent. Sometimes imitrex works.          Other Visit Diagnoses       Morbid obesity (CMS/HCC)                  PAST SURGICAL HISTORY:  Past Surgical History:   Procedure Laterality Date    OTHER SURGICAL HISTORY  04/19/2017    Cholecystotomy    OTHER SURGICAL HISTORY  03/07/2022    Appendectomy    OTHER SURGICAL HISTORY  03/07/2022    Kidney surgery       FAMILY HISTORY:  Family History   Problem Relation Name Age of Onset    Factor V Leiden deficiency Mother      Factor V Leiden deficiency Brother      Diabetes Maternal Grandmother          SOCIAL HISTORY:  Social History     Tobacco Use    Smoking status: Former     " Packs/day: 0.25     Years: 2.00     Additional pack years: 0.00     Total pack years: 0.50     Types: Cigarettes     Quit date: 2004     Years since quittin.2     Passive exposure: Never    Smokeless tobacco: Never    Tobacco comments:     3/20/24:  Verbalizes \"socially smoked 1-2 ciggs\" in high school [employer] [employer]   Vaping Use    Vaping Use: Never used   Substance Use Topics    Alcohol use: Yes     Comment: 3/20/24 Verbalizes socially [facility]       MEDICATIONS:  Prior to Admission Medications:  Current Outpatient Medications   Medication Sig Dispense Refill    levothyroxine (Synthroid, Levoxyl) 75 mcg tablet Take 1 tablet (75 mcg) by mouth once daily. 60 tablet 0    losartan (Cozaar) 25 mg tablet Take 1 tablet (25 mg) by mouth once daily. 30 tablet 0    multivitamin with minerals tablet Take 1 tablet by mouth once daily. 3/20/24 VERBALIZE STARTED TAKING RECENTLY [facility]      SUMAtriptan (Imitrex) 100 mg tablet Take by mouth.       No current facility-administered medications for this visit.       ALLERGIES:  Allergies   Allergen Reactions    Amitriptyline Confusion    Ampicillin Hives    Reglan [Metoclopramide Hcl] Confusion       REVIEW OF SYSTEMS:  GENERAL: Negative for malaise, significant weight loss and fever  HEAD: Negative for headache, swelling.  NECK: Negative for lumps, goiter, pain and significant neck swelling  RESPIRATORY: Negative for cough, wheezing or shortness of breath.  CARDIOVASCULAR: Negative for chest pain, leg swelling or palpitations.  GI: Negative for abdominal discomfort, blood in stools or black stools or change in bowel habits  : No history of dysuria, frequency or incontinence  MUSCULOSKELETAL: Negative for joint pain or swelling, back pain or muscle pain.  SKIN: Negative for lesions, rash, and itching.  PSYCH: Negative for sleep disturbance, mood disorder and recent psychosocial stressors.  ENDOCRINE: Negative for cold or heat intolerance, polyuria, polydipsia and " goiter.    PHYSICAL EXAM:  Vitals:    03/20/24 1305   BP: 128/87   Pulse: 100   SpO2: 96%     General appearance: obese, NAD  Neuro: AOx3  Head: EOMI; no swelling or lesions of scalp or face  ENT:  no lumps or lymphadenopathy, thyroid normal to palpation; oropharynx clear, no swelling or erythema  Skin: warm, no erythema or rashes  Lungs: clear to percussion and auscultation  Heart: regular rhythm and S1, S2 normal  Abdomen: soft, non-tender, no masses, no organomegaly  Extremities: Normal exam of the extremities. No swelling or pain.  Psych: no hurried speech, no flight of ideas, normal affect    IMPRESSION:  Tiffanie Burdick is a 39 y.o. female with the following diagnosis and co-morbidities:  Body mass index is 41.29 kg/m².   Patient Active Problem List   Diagnosis    Arthropathy    Benign essential hypertension    Daily headache    Depression with anxiety    Factor V Leiden (CMS/HCC)    Fatigue    Hypothyroidism, adult    Migraine    Situational anxiety     Diagnosis noted as above, no additional diagnosis at this time.  This patient does meet the criteria for a surgical weight loss procedure according to NIH guidelines.    PLAN:  The plan of treatment for Tiffanie Burdick is to continue with the consultations and tests ordered today in hopes of qualifying for pre-operative clearance for bariatric surgery. This includes:    Consult Nutrition for education and 0 mo SWL  Consult Psychology  Consult cardiology  Labs ordered  EGD  PCP for medical optimization  Consult sleep medicine - concern for HOSSEIN  Counseled on preop weight loss goal of at least 10 lbs    Patient is interested in: sleeve gastrectomy    45 minutes were spent with patient including history, physical exam, and education.    Zachary Kwok MD  Bariatric and Minimally Invasive General Surgery

## 2024-03-20 NOTE — ASSESSMENT & PLAN NOTE
She doesn't take systemic anticoag. She did take lovenox shots for the last month of her pregnancy.

## 2024-03-20 NOTE — H&P (VIEW-ONLY)
BARIATRIC SURGERY NEW PATIENT CONSULTATION  HISTORY AND PHYSICAL      Date: 3/20/2024 Time: 1:41 PM  Name: Tiffanie Burdick  MRN: [id number]    This is a 39 y.o. female with morbid obesity (Body mass index is 41.29 kg/m².) who presents to clinic for consideration of bariatric surgery. she has attempted and failed multiple diet and exercise regimens for weight loss.     PMH:   Benign essential hypertension  On one med    Factor V Leiden (CMS/HCC)  She doesn't take systemic anticoag. She did take lovenox shots for the last month of her pregnancy.     Hypothyroidism, adult  On synthroid.    Depression with anxiety  Not on meds. She does follow up with a therapist.     Migraine  Associated with periods and ovulation - gets them twice a month. Takes excedrin to prevent. Sometimes imitrex works.      PSH: lap appy, lap ismael, ureteral stent.     Denies smoking/vaping/regular EtOH/drug use.   STOPBANG 6 (+ snoring, obesity, witnessed apnea, neck circum, daytime sleepiness, HTN).   No personal hx of VTE. Her brother also has Factor 5 Leiden and has had VTE in the past.     The risks of sleeve gastrectomy, Conor-en-Y gastric bypass, and duodenal switch surgery including bleeding, leak, wound infection, dehydration, ulcers, internal hernia, DVT/PE, prolonged nausea/vomiting, incomplete resolution of associated medical conditions, reflux, weight regain, vitamin/mineral deficiencies, and death have been explained to the patient and Tiffanie Burdick has expressed understanding and acceptance of them.     The increased risk of substance and alcohol abuse following bariatric surgery was discussed with the patient, along with the negative consequences of substance/alcohol use after surgery including addiction, worsening of mental health disorders, and injury to the stomach. The risk of smoking and vaping (tobacco or any other substance) after bariatric surgery was explained to the patient. This includes risk of anastamotic ulcers,  gastritis, bleeding, perforation, stricture, and PO intolerance.  The patient expressed understanding and acceptance of these risks.    The patient was advised not to become pregnant within 12-18 months following bariatric surgery. She was educated on the increased risks to mother and fetus associated with pregnancy within 2 years of bariatric surgery. Her  had a vasectomy.     The benefits of the above surgeries including weight loss, improvement/resolution of associated medical and mental health conditions, improved mobility, and decreased mortality have been explained the the patient and Tiffanie Burdick has expressed understanding and acceptance of them.    PAST MEDICAL HISTORY:  Problem List Items Addressed This Visit       Benign essential hypertension - Primary    Current Assessment & Plan     On one med         Depression with anxiety    Current Assessment & Plan     Not on meds. She does follow up with a therapist.          Factor V Leiden (CMS/HCC)    Current Assessment & Plan     She doesn't take systemic anticoag. She did take lovenox shots for the last month of her pregnancy.          Hypothyroidism, adult    Current Assessment & Plan     On synthroid.         Migraine    Current Assessment & Plan     Associated with periods and ovulation - gets them twice a month. Takes excedrin to prevent. Sometimes imitrex works.          Other Visit Diagnoses       Morbid obesity (CMS/HCC)                  PAST SURGICAL HISTORY:  Past Surgical History:   Procedure Laterality Date    OTHER SURGICAL HISTORY  04/19/2017    Cholecystotomy    OTHER SURGICAL HISTORY  03/07/2022    Appendectomy    OTHER SURGICAL HISTORY  03/07/2022    Kidney surgery       FAMILY HISTORY:  Family History   Problem Relation Name Age of Onset    Factor V Leiden deficiency Mother      Factor V Leiden deficiency Brother      Diabetes Maternal Grandmother          SOCIAL HISTORY:  Social History     Tobacco Use    Smoking status: Former     " Packs/day: 0.25     Years: 2.00     Additional pack years: 0.00     Total pack years: 0.50     Types: Cigarettes     Quit date: 2004     Years since quittin.2     Passive exposure: Never    Smokeless tobacco: Never    Tobacco comments:     3/20/24:  Verbalizes \"socially smoked 1-2 ciggs\" in high school [employer] [facility] [employer]   Vaping Use    Vaping Use: Never used   Substance Use Topics    Alcohol use: Yes     Comment: 3/20/24 Verbalizes socially [employer]       MEDICATIONS:  Prior to Admission Medications:  Current Outpatient Medications   Medication Sig Dispense Refill    levothyroxine (Synthroid, Levoxyl) 75 mcg tablet Take 1 tablet (75 mcg) by mouth once daily. 60 tablet 0    losartan (Cozaar) 25 mg tablet Take 1 tablet (25 mg) by mouth once daily. 30 tablet 0    multivitamin with minerals tablet Take 1 tablet by mouth once daily. 3/20/24 VERBALIZE STARTED TAKING RECENTLY [facility]      SUMAtriptan (Imitrex) 100 mg tablet Take by mouth.       No current facility-administered medications for this visit.       ALLERGIES:  Allergies   Allergen Reactions    Amitriptyline Confusion    Ampicillin Hives    Reglan [Metoclopramide Hcl] Confusion       REVIEW OF SYSTEMS:  GENERAL: Negative for malaise, significant weight loss and fever  HEAD: Negative for headache, swelling.  NECK: Negative for lumps, goiter, pain and significant neck swelling  RESPIRATORY: Negative for cough, wheezing or shortness of breath.  CARDIOVASCULAR: Negative for chest pain, leg swelling or palpitations.  GI: Negative for abdominal discomfort, blood in stools or black stools or change in bowel habits  : No history of dysuria, frequency or incontinence  MUSCULOSKELETAL: Negative for joint pain or swelling, back pain or muscle pain.  SKIN: Negative for lesions, rash, and itching.  PSYCH: Negative for sleep disturbance, mood disorder and recent psychosocial stressors.  ENDOCRINE: Negative for cold or heat intolerance, polyuria, polydipsia and " goiter.    PHYSICAL EXAM:  Vitals:    03/20/24 1305   BP: 128/87   Pulse: 100   SpO2: 96%     General appearance: obese, NAD  Neuro: AOx3  Head: EOMI; no swelling or lesions of scalp or face  ENT:  no lumps or lymphadenopathy, thyroid normal to palpation; oropharynx clear, no swelling or erythema  Skin: warm, no erythema or rashes  Lungs: clear to percussion and auscultation  Heart: regular rhythm and S1, S2 normal  Abdomen: soft, non-tender, no masses, no organomegaly  Extremities: Normal exam of the extremities. No swelling or pain.  Psych: no hurried speech, no flight of ideas, normal affect    IMPRESSION:  Tiffanie Burdick is a 39 y.o. female with the following diagnosis and co-morbidities:  Body mass index is 41.29 kg/m².   Patient Active Problem List   Diagnosis    Arthropathy    Benign essential hypertension    Daily headache    Depression with anxiety    Factor V Leiden (CMS/HCC)    Fatigue    Hypothyroidism, adult    Migraine    Situational anxiety     Diagnosis noted as above, no additional diagnosis at this time.  This patient does meet the criteria for a surgical weight loss procedure according to NIH guidelines.    PLAN:  The plan of treatment for Tiffanie Burdick is to continue with the consultations and tests ordered today in hopes of qualifying for pre-operative clearance for bariatric surgery. This includes:    Consult Nutrition for education and 0 mo SWL  Consult Psychology  Consult cardiology  Labs ordered  EGD  PCP for medical optimization  Consult sleep medicine - concern for HOSSEIN  Counseled on preop weight loss goal of at least 10 lbs    Patient is interested in: sleeve gastrectomy    45 minutes were spent with patient including history, physical exam, and education.    Zachary Kwok MD  Bariatric and Minimally Invasive General Surgery

## 2024-03-20 NOTE — ASSESSMENT & PLAN NOTE
Associated with periods and ovulation - gets them twice a month. Takes excedrin to prevent. Sometimes imitrex works.

## 2024-04-02 ENCOUNTER — NUTRITION (OUTPATIENT)
Dept: SURGERY | Facility: CLINIC | Age: 39
End: 2024-04-02
Payer: COMMERCIAL

## 2024-04-02 VITALS — WEIGHT: 260 LBS | HEIGHT: 67 IN | BODY MASS INDEX: 40.81 KG/M2

## 2024-04-02 NOTE — PROGRESS NOTES
"PREOPERATIVE, MULTIDISCIPLINARY, MEDICALLY SUPERVISED, REDUCED CALORIE DIET, BEHAVIOR MODIFICATION AND EXERCISE PROGRAM    S:  The pt has been working towards her nutrition goals. Noted that she does better when she tracks her intake and this helps her from consuming too much food and making better food choices. Has been working towards eating protein food first. B: collegen powder w/decaf coffee or HB eggs, L: venison w/quinoa and mixed veggies, D: hamberger mac and green beans, S: premier shake. Has been trying to eat slowly. Has been working on emotional eating.  Has been practicing the 30s rule. Noticed that she struggles with the \"before\". Has been drinking 64oz with decaf coffee, green tea, and water with true lemon. Has started to take a MVI.     Went on vacation over the past week and noticed that she was grazing more.     O:    Wt: 260lbs     Ht:    1.702 m (5' 7\")        Body mass index is 40.72 kg/m².      Goal: 5% body weight loss over the course of program    Dietary recommendation:   1. Continue to drink 64oz of noncarbonated/caffeine free/sugar free beverages   2. Practice the 30-30-30 rule by drinking between meals. Talk to your boss about having a break at work.   3. Structure your meal plan - have 3 meals and 1 snack daily. Make a grocery list and set meals so you have what you need to be successful.   4. Have balanced meals that always contain a good source of protein.  5. Increase intake of non-starchy vegetables.  Have 5 servings fruits and vegetables daily.   6. Take a multivitamin daily.   7.  Begin using your gym or exercise videos or walking 3x per week 30 minutes. Increase physical activity by 10-15 minutes to an end goal of 60 minutes 5 x per week.    Group Topic: Going Lean With Protein  Behavioral recommendation: Pt is encouraged to identify and recall sources of lean protein.    A/P: Pt appears to have a good understanding of how to incorporate lean protein into their daily meal " pattern.  Pt has a goal to add a lean protein source to each meal. The pt is doing well. Will continue to work towards her goals. Will follow up next month.     Exercise: nothing     She Allen RDN, LD

## 2024-04-03 ENCOUNTER — LAB (OUTPATIENT)
Dept: LAB | Facility: LAB | Age: 39
End: 2024-04-03
Payer: COMMERCIAL

## 2024-04-03 ENCOUNTER — TELEPHONE (OUTPATIENT)
Dept: PRIMARY CARE | Facility: CLINIC | Age: 39
End: 2024-04-03

## 2024-04-03 DIAGNOSIS — I10 BENIGN ESSENTIAL HYPERTENSION: ICD-10-CM

## 2024-04-03 DIAGNOSIS — F41.8 DEPRESSION WITH ANXIETY: ICD-10-CM

## 2024-04-03 DIAGNOSIS — I10 BENIGN ESSENTIAL HYPERTENSION: Primary | ICD-10-CM

## 2024-04-03 DIAGNOSIS — D68.51 FACTOR V LEIDEN (MULTI): ICD-10-CM

## 2024-04-03 DIAGNOSIS — E03.9 HYPOTHYROIDISM, ADULT: ICD-10-CM

## 2024-04-03 DIAGNOSIS — E03.9 HYPOTHYROIDISM, UNSPECIFIED TYPE: ICD-10-CM

## 2024-04-03 DIAGNOSIS — G43.909 MIGRAINE WITHOUT STATUS MIGRAINOSUS, NOT INTRACTABLE, UNSPECIFIED MIGRAINE TYPE: ICD-10-CM

## 2024-04-03 DIAGNOSIS — E66.01 MORBID OBESITY (MULTI): ICD-10-CM

## 2024-04-03 LAB
25(OH)D3 SERPL-MCNC: 23 NG/ML (ref 30–100)
AMPHETAMINES UR QL SCN: NORMAL
APTT PPP: 30 SECONDS (ref 27–38)
BARBITURATES UR QL SCN: NORMAL
BENZODIAZ UR QL SCN: NORMAL
BZE UR QL SCN: NORMAL
CANNABINOIDS UR QL SCN: NORMAL
FENTANYL+NORFENTANYL UR QL SCN: NORMAL
FERRITIN SERPL-MCNC: 156 NG/ML (ref 8–150)
FOLATE SERPL-MCNC: 21.2 NG/ML
INR PPP: 1 (ref 0.9–1.1)
IRON SATN MFR SERPL: 24 % (ref 25–45)
IRON SERPL-MCNC: 70 UG/DL (ref 35–150)
METHADONE UR QL SCN: NORMAL
OPIATES UR QL SCN: NORMAL
OXYCODONE+OXYMORPHONE UR QL SCN: NORMAL
PCP UR QL SCN: NORMAL
PROTHROMBIN TIME: 11.1 SECONDS (ref 9.8–12.8)
PTH-INTACT SERPL-MCNC: 71.7 PG/ML (ref 18.5–88)
T4 FREE SERPL-MCNC: 0.64 NG/DL (ref 0.61–1.12)
TIBC SERPL-MCNC: 294 UG/DL (ref 240–445)
TSH SERPL-ACNC: 10.02 MIU/L (ref 0.44–3.98)
UIBC SERPL-MCNC: 224 UG/DL (ref 110–370)
VIT B12 SERPL-MCNC: 407 PG/ML (ref 211–911)

## 2024-04-03 PROCEDURE — 82525 ASSAY OF COPPER: CPT

## 2024-04-03 PROCEDURE — 84425 ASSAY OF VITAMIN B-1: CPT

## 2024-04-03 PROCEDURE — 84630 ASSAY OF ZINC: CPT

## 2024-04-03 PROCEDURE — 82306 VITAMIN D 25 HYDROXY: CPT

## 2024-04-03 PROCEDURE — 82728 ASSAY OF FERRITIN: CPT

## 2024-04-03 PROCEDURE — 85730 THROMBOPLASTIN TIME PARTIAL: CPT

## 2024-04-03 PROCEDURE — 80307 DRUG TEST PRSMV CHEM ANLYZR: CPT

## 2024-04-03 PROCEDURE — 83550 IRON BINDING TEST: CPT

## 2024-04-03 PROCEDURE — 82746 ASSAY OF FOLIC ACID SERUM: CPT

## 2024-04-03 PROCEDURE — 85610 PROTHROMBIN TIME: CPT

## 2024-04-03 PROCEDURE — 36415 COLL VENOUS BLD VENIPUNCTURE: CPT

## 2024-04-03 PROCEDURE — 84443 ASSAY THYROID STIM HORMONE: CPT

## 2024-04-03 PROCEDURE — 84439 ASSAY OF FREE THYROXINE: CPT

## 2024-04-03 PROCEDURE — 83540 ASSAY OF IRON: CPT

## 2024-04-03 PROCEDURE — 82607 VITAMIN B-12: CPT

## 2024-04-03 PROCEDURE — 83970 ASSAY OF PARATHORMONE: CPT

## 2024-04-03 RX ORDER — LOSARTAN POTASSIUM 25 MG/1
25 TABLET ORAL DAILY
Qty: 30 TABLET | Refills: 0 | Status: SHIPPED | OUTPATIENT
Start: 2024-04-03 | End: 2024-04-22 | Stop reason: SDUPTHER

## 2024-04-03 NOTE — TELEPHONE ENCOUNTER
Please let patient know I sent over a 30 day supply only so she needs to follow up with us within 30 days. Thanks.

## 2024-04-03 NOTE — TELEPHONE ENCOUNTER
Patient called stating that she is out of her losartan.  She stated that she was here for an appointment but ended up leaving because it took longer then she thought it would and she needed to get to work.  She would like to know if you could send in her medication.

## 2024-04-04 NOTE — TELEPHONE ENCOUNTER
Called patient and let her know.  Spoke with her about missing appointment and needing to reschedule within one month.  Dr. Gonzalez stated that she could see her on the morning of 04/22.  Called patient back to let her know I scheduled her for 8:30 that morning.  Left a message to make her aware and if she had any issues to call the office.

## 2024-04-05 ENCOUNTER — TELEPHONE (OUTPATIENT)
Dept: PRIMARY CARE | Facility: CLINIC | Age: 39
End: 2024-04-05
Payer: COMMERCIAL

## 2024-04-05 DIAGNOSIS — E03.9 HYPOTHYROIDISM, UNSPECIFIED TYPE: Primary | ICD-10-CM

## 2024-04-05 RX ORDER — LEVOTHYROXINE SODIUM 88 UG/1
88 TABLET ORAL DAILY
Qty: 30 TABLET | Refills: 1 | Status: SHIPPED | OUTPATIENT
Start: 2024-04-05 | End: 2024-06-04

## 2024-04-05 NOTE — TELEPHONE ENCOUNTER
Called patient and let her know about her TSH results.  She was very grateful for the call.  She did ask if you could go ahead and put an order in her chart for the lab work so she can just go in 6-8 weeks to check

## 2024-04-05 NOTE — PROGRESS NOTES
Lab Results   Component Value Date    TSH 10.02 (H) 04/03/2024     Will increase Levothyroxine from 75mcg to 88mcg every day. 2 month supply sent to pharmacy. Recheck TSH in 6-8 weeks.  Yaw Gonzalez MD

## 2024-04-06 LAB
COPPER SERPL-MCNC: 88.7 UG/DL (ref 80–155)
ZINC SERPL-MCNC: 79.3 UG/DL (ref 60–120)

## 2024-04-08 ENCOUNTER — ANESTHESIA EVENT (OUTPATIENT)
Dept: OPERATING ROOM | Facility: HOSPITAL | Age: 39
End: 2024-04-08
Payer: COMMERCIAL

## 2024-04-08 LAB — VIT B1 PYROPHOSHATE BLD-SCNC: 167 NMOL/L (ref 70–180)

## 2024-04-09 ENCOUNTER — HOSPITAL ENCOUNTER (OUTPATIENT)
Dept: OPERATING ROOM | Facility: HOSPITAL | Age: 39
Setting detail: OUTPATIENT SURGERY
Discharge: HOME | End: 2024-04-09
Payer: COMMERCIAL

## 2024-04-09 ENCOUNTER — ANESTHESIA (OUTPATIENT)
Dept: OPERATING ROOM | Facility: HOSPITAL | Age: 39
End: 2024-04-09
Payer: COMMERCIAL

## 2024-04-09 VITALS
DIASTOLIC BLOOD PRESSURE: 63 MMHG | HEART RATE: 76 BPM | SYSTOLIC BLOOD PRESSURE: 106 MMHG | RESPIRATION RATE: 16 BRPM | WEIGHT: 264.55 LBS | BODY MASS INDEX: 41.52 KG/M2 | TEMPERATURE: 97.5 F | OXYGEN SATURATION: 96 % | HEIGHT: 67 IN

## 2024-04-09 DIAGNOSIS — E03.9 HYPOTHYROIDISM, ADULT: ICD-10-CM

## 2024-04-09 DIAGNOSIS — I10 BENIGN ESSENTIAL HYPERTENSION: ICD-10-CM

## 2024-04-09 DIAGNOSIS — D68.51 FACTOR V LEIDEN (MULTI): ICD-10-CM

## 2024-04-09 DIAGNOSIS — G43.909 MIGRAINE WITHOUT STATUS MIGRAINOSUS, NOT INTRACTABLE, UNSPECIFIED MIGRAINE TYPE: ICD-10-CM

## 2024-04-09 DIAGNOSIS — F41.8 DEPRESSION WITH ANXIETY: ICD-10-CM

## 2024-04-09 DIAGNOSIS — E66.01 MORBID OBESITY (MULTI): ICD-10-CM

## 2024-04-09 LAB — PREGNANCY TEST URINE, POC: NEGATIVE

## 2024-04-09 PROCEDURE — 3700000001 HC GENERAL ANESTHESIA TIME - INITIAL BASE CHARGE: Performed by: NURSE ANESTHETIST, CERTIFIED REGISTERED

## 2024-04-09 PROCEDURE — 81025 URINE PREGNANCY TEST: CPT | Performed by: ANESTHESIOLOGY

## 2024-04-09 PROCEDURE — 88305 TISSUE EXAM BY PATHOLOGIST: CPT | Performed by: PATHOLOGY

## 2024-04-09 PROCEDURE — 7100000010 HC PHASE TWO TIME - EACH INCREMENTAL 1 MINUTE: Performed by: NURSE ANESTHETIST, CERTIFIED REGISTERED

## 2024-04-09 PROCEDURE — A43239 PR EDG TRANSORAL BIOPSY SINGLE/MULTIPLE: Performed by: NURSE ANESTHETIST, CERTIFIED REGISTERED

## 2024-04-09 PROCEDURE — 3600000007 HC OR TIME - EACH INCREMENTAL 1 MINUTE - PROCEDURE LEVEL TWO: Performed by: NURSE ANESTHETIST, CERTIFIED REGISTERED

## 2024-04-09 PROCEDURE — 2500000005 HC RX 250 GENERAL PHARMACY W/O HCPCS: Performed by: NURSE ANESTHETIST, CERTIFIED REGISTERED

## 2024-04-09 PROCEDURE — 3700000002 HC GENERAL ANESTHESIA TIME - EACH INCREMENTAL 1 MINUTE: Performed by: NURSE ANESTHETIST, CERTIFIED REGISTERED

## 2024-04-09 PROCEDURE — 3600000002 HC OR TIME - INITIAL BASE CHARGE - PROCEDURE LEVEL TWO: Performed by: NURSE ANESTHETIST, CERTIFIED REGISTERED

## 2024-04-09 PROCEDURE — 2500000004 HC RX 250 GENERAL PHARMACY W/ HCPCS (ALT 636 FOR OP/ED): Performed by: NURSE ANESTHETIST, CERTIFIED REGISTERED

## 2024-04-09 PROCEDURE — 43239 EGD BIOPSY SINGLE/MULTIPLE: CPT | Performed by: SURGERY

## 2024-04-09 PROCEDURE — 7100000009 HC PHASE TWO TIME - INITIAL BASE CHARGE: Performed by: NURSE ANESTHETIST, CERTIFIED REGISTERED

## 2024-04-09 PROCEDURE — 88305 TISSUE EXAM BY PATHOLOGIST: CPT | Mod: TC,GEALAB | Performed by: SURGERY

## 2024-04-09 PROCEDURE — 2500000004 HC RX 250 GENERAL PHARMACY W/ HCPCS (ALT 636 FOR OP/ED): Performed by: ANESTHESIOLOGY

## 2024-04-09 RX ORDER — SODIUM CHLORIDE, SODIUM LACTATE, POTASSIUM CHLORIDE, CALCIUM CHLORIDE 600; 310; 30; 20 MG/100ML; MG/100ML; MG/100ML; MG/100ML
100 INJECTION, SOLUTION INTRAVENOUS CONTINUOUS
Status: DISCONTINUED | OUTPATIENT
Start: 2024-04-09 | End: 2024-04-10 | Stop reason: HOSPADM

## 2024-04-09 RX ORDER — LIDOCAINE HYDROCHLORIDE 10 MG/ML
INJECTION, SOLUTION EPIDURAL; INFILTRATION; INTRACAUDAL; PERINEURAL AS NEEDED
Status: DISCONTINUED | OUTPATIENT
Start: 2024-04-09 | End: 2024-04-09

## 2024-04-09 RX ORDER — FENTANYL CITRATE 50 UG/ML
INJECTION, SOLUTION INTRAMUSCULAR; INTRAVENOUS AS NEEDED
Status: DISCONTINUED | OUTPATIENT
Start: 2024-04-09 | End: 2024-04-09

## 2024-04-09 RX ORDER — MIDAZOLAM HYDROCHLORIDE 1 MG/ML
INJECTION INTRAMUSCULAR; INTRAVENOUS AS NEEDED
Status: DISCONTINUED | OUTPATIENT
Start: 2024-04-09 | End: 2024-04-09

## 2024-04-09 RX ORDER — PROPOFOL 10 MG/ML
INJECTION, EMULSION INTRAVENOUS AS NEEDED
Status: DISCONTINUED | OUTPATIENT
Start: 2024-04-09 | End: 2024-04-09

## 2024-04-09 RX ADMIN — PROPOFOL 20 MG: 10 INJECTION, EMULSION INTRAVENOUS at 08:25

## 2024-04-09 RX ADMIN — SODIUM CHLORIDE, POTASSIUM CHLORIDE, SODIUM LACTATE AND CALCIUM CHLORIDE 100 ML/HR: 600; 310; 30; 20 INJECTION, SOLUTION INTRAVENOUS at 07:14

## 2024-04-09 RX ADMIN — FENTANYL CITRATE 50 MCG: 50 INJECTION, SOLUTION INTRAMUSCULAR; INTRAVENOUS at 08:19

## 2024-04-09 RX ADMIN — MIDAZOLAM HYDROCHLORIDE 2 MG: 1 INJECTION, SOLUTION INTRAMUSCULAR; INTRAVENOUS at 08:15

## 2024-04-09 RX ADMIN — FENTANYL CITRATE 50 MCG: 50 INJECTION, SOLUTION INTRAMUSCULAR; INTRAVENOUS at 08:15

## 2024-04-09 RX ADMIN — PROPOFOL 20 MG: 10 INJECTION, EMULSION INTRAVENOUS at 08:23

## 2024-04-09 RX ADMIN — PROPOFOL 50 MG: 10 INJECTION, EMULSION INTRAVENOUS at 08:21

## 2024-04-09 RX ADMIN — LIDOCAINE HYDROCHLORIDE 100 MG: 10 INJECTION, SOLUTION EPIDURAL; INFILTRATION; INTRACAUDAL; PERINEURAL at 08:21

## 2024-04-09 SDOH — HEALTH STABILITY: MENTAL HEALTH: CURRENT SMOKER: 0

## 2024-04-09 ASSESSMENT — PAIN SCALES - GENERAL
PAIN_LEVEL: 2
PAINLEVEL_OUTOF10: 0 - NO PAIN

## 2024-04-09 ASSESSMENT — PAIN - FUNCTIONAL ASSESSMENT: PAIN_FUNCTIONAL_ASSESSMENT: 0-10

## 2024-04-09 NOTE — ANESTHESIA POSTPROCEDURE EVALUATION
Patient: Tiffanie Burdick    Procedure Summary       Date: 04/09/24 Room / Location: [facility] OR    Anesthesia Start: 0817 Anesthesia Stop: 0832    Procedure: EGD Diagnosis:       Morbid obesity (CMS/[facility])      Benign essential hypertension      Factor V Leiden (CMS/[facility])      Hypothyroidism, adult      Depression with anxiety      Migraine without status migrainosus, not intractable, unspecified migraine type    Scheduled Providers: Zachary Kwok MD Responsible Provider: NIKKI Hamilton    Anesthesia Type: MAC ASA Status: 3            Anesthesia Type: MAC    Vitals Value Taken Time   /63 04/09/24 0904   Temp 36.4 °C (97.5 °F) 04/09/24 0834   Pulse 76 04/09/24 0904   Resp 16 04/09/24 0904   SpO2 96 % 04/09/24 0904       Anesthesia Post Evaluation    Patient location during evaluation: PACU  Patient participation: complete - patient participated  Level of consciousness: awake  Pain score: 2  Pain management: adequate  Multimodal analgesia pain management approach  Airway patency: patent  Two or more strategies used to mitigate risk of obstructive sleep apnea  Cardiovascular status: acceptable  Respiratory status: acceptable  Hydration status: acceptable  Postoperative Nausea and Vomiting: none    No notable events documented.

## 2024-04-09 NOTE — ANESTHESIA PREPROCEDURE EVALUATION
Patient: Tiffanie Burdick    Procedure Information       Date/Time: 04/09/24 0800    Scheduled providers: Zachary Kwok MD    Procedure: EGD    Location: [facility], [place] OR            Relevant Problems   Cardiac   (+) Benign essential hypertension      Neuro   (+) Daily headache   (+) Depression with anxiety   (+) Situational anxiety      Endocrine   (+) Hypothyroidism, adult      Hematology   (+) Factor V Leiden (CMS/HCC)       Clinical information reviewed:   Tobacco  Allergies  Meds   Med Hx  Surg Hx   Fam Hx  Soc Hx        NPO Detail:  NPO/Void Status  Carbohydrate Drink Given Prior to Surgery? : N  Date of Last Liquid: 04/08/24  Time of Last Liquid: 1900  Date of Last Solid: 04/08/24  Time of Last Solid: 1900  Last Intake Type: Clear fluids  Time of Last Void: 0656         Physical Exam    Airway  Mallampati: III  TM distance: >3 FB  Neck ROM: full     Cardiovascular    Dental    Pulmonary    Abdominal            Anesthesia Plan    History of general anesthesia?: yes  History of complications of general anesthesia?: no    ASA 3     MAC     The patient is not a current smoker.    Anesthetic plan and risks discussed with patient.    Plan discussed with attending.

## 2024-04-11 PROBLEM — F41.8 SITUATIONAL ANXIETY: Status: RESOLVED | Noted: 2023-03-24 | Resolved: 2024-04-11

## 2024-04-11 NOTE — PROGRESS NOTES
[facility] Sleep Medicine  [facility] ONE  [facility] ONE  [address] [place] [address]     [facility] Sleep Medicine Clinic  New Visit Note      Subjective   Patient ID: Tiffanie Burdick is a 39 y.o. female with past medical history significant for Morbid Obesity, Depression. Anxiety, Headache, and Sleep disorder breathing.      4/19/2024: The patient is here alone today and was referred by bariatrics Zachary Kwok MD, for a comprehensive sleep medicine evaluation due to snoring and daytime sleepiness and a preoperative risk evaluation before bariatric surgery. She reports her general sleepiness and thinks that was from her six kids make her tired. She also has a family history of HOSSEIN. Today, she came here to establish care before bariatric surgery. ESS is 13, TRUPTI is 15, and the neck circumference is 16 inches today.     HPI  The patient had had these symptoms for at least one year.   The patient has never had a sleep study done yet.     SLEEP STUDY HISTORY: (personally reviewed raw data such as interpretation report, data sheet, hypnogram, and titration table if available and applicable)  N/A    SLEEP-WAKE SCHEDULE  Bedtime: 10 PM on weekdays, 10:30 PM  on weekends  Subjective sleep latency: 10 minutes  Problems falling asleep: No  Number of awakenings: 2 times per night spontaneously for unknown reasons  Falls back asleep in 2 hours  Problems staying asleep: Yes  Final wake time: 6:30 AM on weekdays, 7:30 AM on weekends  Out of bed time: 6:35 AM on weekdays, 7:30 AM on weekends  Shift work: No  Naps: No  Average sleep duration (excluding naps): 8 hours    SLEEP ENVIRONMENT  Sleep location: bed  Sleep status: sleeps with   Room is dark:  Yes  Room is quiet: Yes  Room is cool: Yes  Bed comfort: good    SLEEP HABITS:   Activities before bedtime: read a book  Activities in bed: read a book  Preferred sleep position: back and side    SLEEP ROS:  Night  symptoms: POSITIVE for snoring  Morning symptoms: POSITIVE for unrefreshing sleep, morning headache  Daytime symptoms POSITIVE for excessive daytime sleepiness and fatigue  Hypersomnia / narcolepsy symptoms: Patient denies symptoms of a hypersomnolence disorder such as sleep paralysis, sleep-related hallucinations, recurrent sleep attacks, automatic behaviors, and cataplexy.   RLS symptoms: Patient denies RLS symptoms.  Movements in sleep: Patient denies problematic movements in sleep.  Parasomnia symptoms: Patient denies symptoms of parasomnia.    WEIGHT: gained 20 lbs in 6 months     REVIEW OF SYSTEMS: All other systems have been reviewed and are negative.    PERTINENT SOCIAL HISTORY:  Occupation: [employer]  Smoking: No   ETOH: Yes, socialally  Marijuana: No   Caffeine: Yes  Sleep aids: No   Claustrophobia: No     PERTINENT PAST SURGICAL HISTORY:  non-contributory    PERTINENT FAMILY HISTORY:  sleep apnea- father, sister    Active Problems, Allergy List, Medication List, and PMH/PSH/FH/Social Hx have been reviewed and reconciled in chart. No significant changes unless documented in the pertinent chart section. Updates made when necessary.       Objective   Vitals:    04/19/24 0758   BP: 111/79   Pulse: 89   Resp: 16   SpO2: 96%        Physical Exam  Constitutional:alert and oriented to time, place, and person  Sinus: - tenderness to palpation  Palate: Narrow Mallampati 2, - Tongue scalloping, - macroglossia  Lungs: Clear to auscultation bilateral, no rales  Heart: Regular rate and rhythm, no murmurs    Assessment/Plan   Tiffanie Burdick is a 39 y.o. female who is seen to evaluate for possible obstructive sleep apnea. The pathophysiology of sleep apnea, diagnostic testing (HST vs PSG), treatment options (PAP, oral appliance, surgery, hypoglossal nerve stimulator called Inspire), and supportive management (weight loss, positional therapy, smoking cessation, avoidance of alcohol and sedatives) were discussed  with the patient in detail. Risk factors of sleep apnea as well as cardiometabolic and neurocognitive sequelae associated with untreated sleep apnea were also discussed. Lastly, patient was advised to avoid driving vehicle or operating heavy machinery when sleepy.     Tiffanie Burdick with the following problems:     # SLEEP DISORDERED BREATHING:  -This is likely sleep apnea based on the the history and physical examination.   Tiffanie Joyce not yet had a sleep study.  -Instruct patient to complete home sleep study.  -HSAT is reasonable as patient likely has HOSSEIN based on history and exam and does not have any of the following comorbidities: CHF, neuromuscular weakness, hypoventilation, or significant COPD.  -We consider treatment as indicated when testing is complete.     # CHRONIC SLEEP MAINTENANCE INSOMNIA:  -likely due to poor sleep hygiene, and untreated sleep apnea  -TRUPTI: 15  -Will reevaluate after study/treatment    # DEPRESSION :   -Tiffanie Patterson not taking any medication and doing well.  -Denies HI/SI     # HYPERTENSION:  -Blood pressure was controlled today.  -Denies headache, palpitation, and syncope in the clinic.  -Follows with PCP/ Cardiology     # MORBID OBESITY:  -with a BMI of  40.72.   Bicarbonate   Date Value Ref Range Status   02/23/2024 27 21 - 32 mmol/L Final   -Encourage to have regular exercise to manage weight well.  -Bariatric surgery candidate.       RTC 2-3 weeks after sleep study      All of patient's questions were answered. She verbalizes understanding and agreement with my assessment and plan.

## 2024-04-12 ENCOUNTER — OFFICE VISIT (OUTPATIENT)
Dept: CARDIOLOGY | Facility: HOSPITAL | Age: 39
End: 2024-04-12
Payer: COMMERCIAL

## 2024-04-12 VITALS
HEART RATE: 84 BPM | WEIGHT: 263.89 LBS | SYSTOLIC BLOOD PRESSURE: 115 MMHG | OXYGEN SATURATION: 96 % | DIASTOLIC BLOOD PRESSURE: 82 MMHG | BODY MASS INDEX: 41.33 KG/M2

## 2024-04-12 DIAGNOSIS — E03.9 HYPOTHYROIDISM, ADULT: ICD-10-CM

## 2024-04-12 DIAGNOSIS — D68.51 FACTOR V LEIDEN (MULTI): ICD-10-CM

## 2024-04-12 DIAGNOSIS — I10 BENIGN ESSENTIAL HYPERTENSION: ICD-10-CM

## 2024-04-12 DIAGNOSIS — G43.909 MIGRAINE WITHOUT STATUS MIGRAINOSUS, NOT INTRACTABLE, UNSPECIFIED MIGRAINE TYPE: ICD-10-CM

## 2024-04-12 DIAGNOSIS — E66.01 MORBID OBESITY (MULTI): ICD-10-CM

## 2024-04-12 DIAGNOSIS — Z01.810 PREOP CARDIOVASCULAR EXAM: Primary | ICD-10-CM

## 2024-04-12 DIAGNOSIS — F41.8 DEPRESSION WITH ANXIETY: ICD-10-CM

## 2024-04-12 LAB
LABORATORY COMMENT REPORT: NORMAL
PATH REPORT.FINAL DX SPEC: NORMAL
PATH REPORT.GROSS SPEC: NORMAL
PATH REPORT.RELEVANT HX SPEC: NORMAL
PATH REPORT.TOTAL CANCER: NORMAL

## 2024-04-12 PROCEDURE — 1036F TOBACCO NON-USER: CPT | Performed by: NURSE PRACTITIONER

## 2024-04-12 PROCEDURE — 93005 ELECTROCARDIOGRAM TRACING: CPT | Performed by: NURSE PRACTITIONER

## 2024-04-12 PROCEDURE — 93010 ELECTROCARDIOGRAM REPORT: CPT | Performed by: INTERNAL MEDICINE

## 2024-04-12 PROCEDURE — 3079F DIAST BP 80-89 MM HG: CPT | Performed by: NURSE PRACTITIONER

## 2024-04-12 PROCEDURE — 99203 OFFICE O/P NEW LOW 30 MIN: CPT | Performed by: NURSE PRACTITIONER

## 2024-04-12 PROCEDURE — 3074F SYST BP LT 130 MM HG: CPT | Performed by: NURSE PRACTITIONER

## 2024-04-12 PROCEDURE — 99213 OFFICE O/P EST LOW 20 MIN: CPT | Mod: 25 | Performed by: NURSE PRACTITIONER

## 2024-04-12 ASSESSMENT — ENCOUNTER SYMPTOMS
PSYCHIATRIC NEGATIVE: 1
MUSCULOSKELETAL NEGATIVE: 1
NEUROLOGICAL NEGATIVE: 1
ENDOCRINE NEGATIVE: 1
CONSTITUTIONAL NEGATIVE: 1
GASTROINTESTINAL NEGATIVE: 1
CARDIOVASCULAR NEGATIVE: 1
DEPRESSION: 0
HEMATOLOGIC/LYMPHATIC NEGATIVE: 1
EYES NEGATIVE: 1
RESPIRATORY NEGATIVE: 1

## 2024-04-12 NOTE — PROGRESS NOTES
Referred by Dr. Kwok for Pre-op Clearance (Gastric sleeve with Dr. Zachary Kwok)     History Of Present Illness:    Dear Dr. Kwok,     I had the pleasure of meeting Mrs. Burdick today at [facility] for perioperative cardiac clearance. The patient is seen in collaboration with Dr. Rodrigez. Mrs. Burdick is a very pleasant 39 year old female with a history of HTN, appendectomy, hypothyroidism and migraines, denies smoking. Family history of CAD. She denies chest pain, shortness of breath or heart palpitations. Continues to stay active on a regular basis without limitations.     Review of Systems   Constitutional: Negative.   HENT: Negative.     Eyes: Negative.    Cardiovascular: Negative.    Respiratory: Negative.     Endocrine: Negative.    Hematologic/Lymphatic: Negative.    Skin: Negative.    Musculoskeletal: Negative.    Gastrointestinal: Negative.    Neurological: Negative.    Psychiatric/Behavioral: Negative.        Past Medical History:  She has a past medical history of Bariatric surgery status, Body mass index (BMI) 40.0-44.9, adult (Multi), Depression with anxiety, Encounter for vitamin deficiency screening, HTN (hypertension), Hypothyroid, Migraines, Obesity, Personal history of other diseases of the nervous system and sense organs, Pre-operative clearance (04/09/2024), and Tonsillitis (04/27/2023).    Past Surgical History:  She has a past surgical history that includes Other surgical history (04/19/2017); Other surgical history (03/07/2022); and Other surgical history (03/07/2022).      Social History:  She reports that she has never smoked. She has never been exposed to tobacco smoke. She has never used smokeless tobacco. She reports that she does not currently use alcohol after a past usage of about 3.0 standard drinks of alcohol per week. She reports that she does not use drugs.    Family History:  Family History   Problem Relation Name Age of Onset    Factor V Leiden  deficiency Mother Vee     Obesity Mother Vee     Thyroid disease Mother Vee     Factor V Leiden deficiency Brother      Diabetes Maternal Grandmother Grandma 2     Heart disease Maternal Grandmother Grandma 2     Depression Father Grandma     Obesity Father Grandma     Obesity Sister Thalia         Allergies:  Amitriptyline, Ampicillin, and Reglan [metoclopramide hcl]    Outpatient Medications:  Current Outpatient Medications   Medication Instructions    levothyroxine (SYNTHROID, LEVOXYL) 88 mcg, oral, Daily    losartan (COZAAR) 25 mg, oral, Daily    multivitamin with minerals tablet 1 tablet, oral, Daily, 3/20/24 VERBALIZE STARTED TAKING RECENTLY [facility]     SUMAtriptan (Imitrex) 100 mg tablet oral        Last Recorded Vitals:  Vitals:    04/12/24 0820   BP: 115/82   Pulse: 84   SpO2: 96%   Weight: 120 kg (263 lb 14.3 oz)       Physical Exam:  Physical Exam  Vitals reviewed.   HENT:      Head: Normocephalic.      Nose: Nose normal.   Eyes:      Pupils: Pupils are equal, round, and reactive to light.   Cardiovascular:      Rate and Rhythm: Normal rate and regular rhythm.   Pulmonary:      Effort: Pulmonary effort is normal.      Breath sounds: Normal breath sounds.   Abdominal:      General: Abdomen is flat.      Palpations: Abdomen is soft.   Musculoskeletal:         General: Normal range of motion.      Cervical back: Normal range of motion.   Skin:     General: Skin is warm and dry.   Neurological:      General: No focal deficit present.      Mental Status: He is alert and oriented to person, place, and time.   Psychiatric:         Mood and Affect: Mood normal.            Last Labs:  CBC -  Lab Results   Component Value Date    WBC 7.0 02/23/2024    HGB 15.1 02/23/2024    HCT 45.3 02/23/2024    MCV 92 02/23/2024     02/23/2024       CMP -  Lab Results   Component Value Date    CALCIUM 9.2 02/23/2024    PHOS 3.9 05/19/2023    PROT 6.8 02/23/2024    ALBUMIN 4.5 02/23/2024    AST 20 02/23/2024    ALT 24  02/23/2024    ALKPHOS 45 02/23/2024    BILITOT 0.7 02/23/2024       LIPID PANEL -   Lab Results   Component Value Date    CHOL 164 02/23/2024    TRIG 130 02/23/2024    HDL 48.2 02/23/2024    CHHDL 3.4 02/23/2024    LDLF 69 02/17/2023    VLDL 26 02/23/2024    NHDL 116 02/23/2024       RENAL FUNCTION PANEL -   Lab Results   Component Value Date    GLUCOSE 86 02/23/2024     02/23/2024    K 4.5 02/23/2024     02/23/2024    CO2 27 02/23/2024    ANIONGAP 12 02/23/2024    BUN 11 02/23/2024    CREATININE 0.81 02/23/2024    CALCIUM 9.2 02/23/2024    PHOS 3.9 05/19/2023    ALBUMIN 4.5 02/23/2024        Lab Results   Component Value Date    HGBA1C 4.8 02/23/2024       Last Cardiology Tests:  ECG:  EKG independently reviewed from today showed sinus rhythm heart rate 92 bpm   Echo:    Ejection Fractions:    Cath:    Stress Test:    Cardiac Imaging:      Assessment/Plan   Mrs. Burdick is a very pleasant 39 year old female with a history of HTN and hyperthyroidism, she is here for perioperative cardiac clearance for gastric sleeve surgery. EKG shows a sinus rhythm. She is able to walk up two flights of stairs without dyspnea. She is low cardiac risk for surgery and may proceed to the OR without further cardiac testing. Heart rate and blood pressure are well controlled today.     Plan   -call with any questions   -continue Losartan   -low cardiac risk for surgery     I appreciate the opportunity to participate in the patient's care, please call with any questions       Geno Galloway, APRN-CNP

## 2024-04-13 LAB
ATRIAL RATE: 92 BPM
P AXIS: 49 DEGREES
P OFFSET: 198 MS
P ONSET: 144 MS
PR INTERVAL: 150 MS
Q ONSET: 219 MS
QRS COUNT: 15 BEATS
QRS DURATION: 76 MS
QT INTERVAL: 362 MS
QTC CALCULATION(BAZETT): 447 MS
QTC FREDERICIA: 417 MS
R AXIS: 2 DEGREES
T AXIS: 48 DEGREES
T OFFSET: 400 MS
VENTRICULAR RATE: 92 BPM

## 2024-04-15 PROBLEM — N64.4 PAIN OF LEFT BREAST: Status: ACTIVE | Noted: 2024-04-15

## 2024-04-15 PROBLEM — R07.9 CHEST PAIN: Status: ACTIVE | Noted: 2024-04-15

## 2024-04-15 PROBLEM — I10 PRIMARY HYPERTENSION: Status: ACTIVE | Noted: 2022-04-13

## 2024-04-15 PROBLEM — F43.9 STRESS: Status: ACTIVE | Noted: 2022-04-13

## 2024-04-15 PROBLEM — N23 RENAL PAIN: Status: ACTIVE | Noted: 2024-04-15

## 2024-04-15 PROBLEM — E06.3 HASHIMOTO'S THYROIDITIS: Status: ACTIVE | Noted: 2022-04-13

## 2024-04-15 PROBLEM — E55.9 VITAMIN D DEFICIENCY: Status: ACTIVE | Noted: 2022-04-13

## 2024-04-15 PROBLEM — M25.50 ARTHRALGIA: Status: ACTIVE | Noted: 2022-04-13

## 2024-04-15 PROBLEM — Z77.120 MOLD EXPOSURE: Status: ACTIVE | Noted: 2022-04-13

## 2024-04-15 PROBLEM — F41.9 ANXIETY: Status: ACTIVE | Noted: 2023-03-24

## 2024-04-15 PROBLEM — R21 RASH AND OTHER NONSPECIFIC SKIN ERUPTION: Status: ACTIVE | Noted: 2023-07-14

## 2024-04-15 PROBLEM — E03.9 HYPOTHYROIDISM: Status: ACTIVE | Noted: 2023-03-24

## 2024-04-15 PROBLEM — E66.01 MORBID OBESITY (MULTI): Status: ACTIVE | Noted: 2024-04-03

## 2024-04-15 PROBLEM — R05.2 SUBACUTE COUGH: Status: ACTIVE | Noted: 2024-04-15

## 2024-04-15 PROBLEM — Z86.69 HISTORY OF MIGRAINE: Status: ACTIVE | Noted: 2024-04-15

## 2024-04-15 PROBLEM — Z77.098 CHEMICAL EXPOSURE: Status: ACTIVE | Noted: 2022-04-13

## 2024-04-15 PROBLEM — K12.1 MOUTH ULCERATION: Status: ACTIVE | Noted: 2024-04-15

## 2024-04-15 PROBLEM — D68.51 FACTOR V LEIDEN MUTATION (MULTI): Status: ACTIVE | Noted: 2023-03-24

## 2024-04-15 PROBLEM — Z77.018 EXPOSURE TO MERCURY: Status: ACTIVE | Noted: 2022-04-13

## 2024-04-15 PROBLEM — G43.909 MIGRAINE HEADACHE: Status: ACTIVE | Noted: 2023-03-24

## 2024-04-15 PROBLEM — R51.9 HEADACHE: Status: ACTIVE | Noted: 2024-04-15

## 2024-04-15 PROBLEM — R07.81 RIB PAIN: Status: ACTIVE | Noted: 2024-04-15

## 2024-04-15 PROBLEM — J06.9 UPPER RESPIRATORY TRACT INFECTION: Status: ACTIVE | Noted: 2024-04-15

## 2024-04-15 PROBLEM — R23.2 FLUSHING: Status: ACTIVE | Noted: 2024-04-15

## 2024-04-15 PROBLEM — R63.5 WEIGHT GAIN: Status: ACTIVE | Noted: 2022-04-13

## 2024-04-15 PROBLEM — J03.90 ACUTE TONSILLITIS: Status: ACTIVE | Noted: 2024-04-15

## 2024-04-16 DIAGNOSIS — E66.01 MORBID OBESITY (MULTI): ICD-10-CM

## 2024-04-19 ENCOUNTER — OFFICE VISIT (OUTPATIENT)
Dept: SLEEP MEDICINE | Facility: CLINIC | Age: 39
End: 2024-04-19
Payer: COMMERCIAL

## 2024-04-19 VITALS
RESPIRATION RATE: 16 BRPM | DIASTOLIC BLOOD PRESSURE: 79 MMHG | SYSTOLIC BLOOD PRESSURE: 111 MMHG | BODY MASS INDEX: 40.81 KG/M2 | HEART RATE: 89 BPM | WEIGHT: 260 LBS | OXYGEN SATURATION: 96 % | HEIGHT: 67 IN

## 2024-04-19 DIAGNOSIS — F41.8 DEPRESSION WITH ANXIETY: ICD-10-CM

## 2024-04-19 DIAGNOSIS — E66.01 MORBID OBESITY (MULTI): ICD-10-CM

## 2024-04-19 DIAGNOSIS — Z98.84 BARIATRIC SURGERY STATUS: ICD-10-CM

## 2024-04-19 DIAGNOSIS — G43.909 MIGRAINE WITHOUT STATUS MIGRAINOSUS, NOT INTRACTABLE, UNSPECIFIED MIGRAINE TYPE: ICD-10-CM

## 2024-04-19 DIAGNOSIS — I10 BENIGN ESSENTIAL HYPERTENSION: ICD-10-CM

## 2024-04-19 DIAGNOSIS — G47.30 SLEEP DISORDER BREATHING: Primary | ICD-10-CM

## 2024-04-19 PROCEDURE — 99214 OFFICE O/P EST MOD 30 MIN: CPT

## 2024-04-19 PROCEDURE — 3078F DIAST BP <80 MM HG: CPT

## 2024-04-19 PROCEDURE — 3074F SYST BP LT 130 MM HG: CPT

## 2024-04-19 PROCEDURE — 1036F TOBACCO NON-USER: CPT

## 2024-04-19 PROCEDURE — 99204 OFFICE O/P NEW MOD 45 MIN: CPT

## 2024-04-19 ASSESSMENT — SLEEP AND FATIGUE QUESTIONNAIRES
SLEEP_PROBLEM_INTERFERES_DAILY_ACTIVITIES: SOMEWHAT
HOW LIKELY ARE YOU TO NOD OFF OR FALL ASLEEP WHILE LYING DOWN TO REST IN THE AFTERNOON WHEN CIRCUMSTANCES PERMIT: MODERATE CHANCE OF DOZING
WORRIED_DISTRESSED_DUE_TO_SLEEP: SOMEWHAT
HOW LIKELY ARE YOU TO NOD OFF OR FALL ASLEEP IN A CAR, WHILE STOPPED FOR A FEW MINUTES IN TRAFFIC: WOULD NEVER DOZE
HOW LIKELY ARE YOU TO NOD OFF OR FALL ASLEEP WHEN YOU ARE A PASSENGER IN A CAR FOR AN HOUR WITHOUT A BREAK: HIGH CHANCE OF DOZING
SLEEP_PROBLEM_NOTICEABLE_TO_OTHERS: SOMEWHAT
HOW LIKELY ARE YOU TO NOD OFF OR FALL ASLEEP WHILE SITTING AND READING: HIGH CHANCE OF DOZING
HOW LIKELY ARE YOU TO NOD OFF OR FALL ASLEEP WHILE SITTING QUIETLY AFTER LUNCH WITHOUT ALCOHOL: SLIGHT CHANCE OF DOZING
SITING INACTIVE IN A PUBLIC PLACE LIKE A CLASS ROOM OR A MOVIE THEATER: SLIGHT CHANCE OF DOZING
SATISFACTION_WITH_CURRENT_SLEEP_PATTERN: SATISFIED
HOW LIKELY ARE YOU TO NOD OFF OR FALL ASLEEP WHILE SITTING AND TALKING TO SOMEONE: WOULD NEVER DOZE
HOW LIKELY ARE YOU TO NOD OFF OR FALL ASLEEP WHILE WATCHING TV: HIGH CHANCE OF DOZING
ESS-CHAD TOTAL SCORE: 13
DIFFICULTY_STAYING_ASLEEP: MILD

## 2024-04-19 ASSESSMENT — PAIN SCALES - GENERAL: PAINLEVEL: 0-NO PAIN

## 2024-04-19 NOTE — PATIENT INSTRUCTIONS
[facility] Sleep Medicine  [facility] ONE  [facility] ONE  [address] [place] [address]       Thank you for coming to the Sleep Medicine Clinic today! Your sleep medicine provider today was: REBECCA Garcia Below is a summary of your treatment plan, patient education, other important information, and our contact numbers.    Dear Tiffanie Burdick,    Thank you for visiting  Sleep Medicine Clinic !     1. We will proceed with a HOME sleep study to check your risk of sleep apnea. The lab will call you and schedule it for you.     2. Please do not drive when you are sleepy and  start practicing the sleep hygiene  as discussed in clinic today.     3. Please start/continue practicing the appropriate nasal spray at night to ease your nasal congestion as discussed in clinic today, and using Biotene to ease your dry mouth if needed.    4. FOR QUESTIONS AND CONCERNS:   a) : To schedule, cancel, or reschedule SLEEP STUDY appointments, please call [phone]  b): Please call my office with issues or questions: [phone] ([place]); [phone] ([facility]); [phone] ([facility])    In the event that you are running more than 10 minutes late to your appointment, I will kindly ask you to reschedule. Thanks.      TREATMENT PLAN     Follow-up Appointment:   Follow-up in 2-3 weeks after sleep study.    PATIENT EDUCATION     Obstructive Sleep Apnea (HOSSEIN) is a sleep disorder where your upper airway muscles relax during sleep and the airway intermittently and repetitively narrows and collapses leading to partially blocked airway (hypopnea) or completely blocked airway (apnea) which, in turn, can disrupt breathing in sleep, lower oxygen levels while you sleep and cause night time wakings. Because both apnea and hypopnea may cause higher carbon dioxide or low oxygen levels, untreated HOSSEIN can lead to heart arrhythmia, elevation of blood pressure, and make it harder for the  body to consolidate memory and facilitate metabolism (leading to higher blood sugars at night). Frequent partial arousals occur during sleep resulting in sleep deprivation and daytime sleepiness. HOSSEIN is associated with an increased risk of cardiovascular disease, stroke, hypertension, and insulin resistance. Moreover, untreated HOSSEIN with excessive daytime sleepiness can increase the risk of motor vehicular accidents.    Some conservative strategies for HOSSEIN regardless of HOSSEIN severity are:   Positional therapy - Avoid sleeping on your back.   Healthy diet and regular exercise to optimize weight is highly encouraged.   Avoid alcohol late in the evening and sedative-hypnotics as these substances can make sleep apnea worse.   Improve breathing through the nose with intranasal steroid spray, saline rinse, or antihistamines    Safety: Avoid driving vehicle and operating heavy equipment while sleepy. Drowsy driving may lead to life-threatening motor vehicle accidents. A person driving while sleepy is 5 times more likely to have an accident. If you feel sleepy, pull over and take a short power nap (sleep for less than 30 minutes). Otherwise, ask somebody to drive you.    Treatment options for sleep apnea include weight management, positional therapy, Positive Airway Therapy (PAP) therapy, oral appliance therapy, hypoglossal nerve stimulator (Inspire) and select airway surgeries.    Sleep Testing for sleep apnea: The best and ideal way to check out if you have sleep apnea is to do an overnight sleep study in the sleep laboratory. Alternatively, a home sleep apnea test can also be done depending on your insurance and risk factors.     If you are having a home sleep apnea test, kindly allot 1 hour during pickup of the testing kit as you will have to complete paperwork and listen to the sleep technician for in-person on-the-spot demonstration and instructions on how to hook up the testing kit at home. Do the test for 1 day and  start off with sleeping on your back. If you sleep on your side in the middle of night or you have always been a side or stomach-sleeper, it is ok as long as you have some time on your back and off-back.     If you are having an overnight in-lab sleep study, please make sure to bring toiletries, a comfy pillow, additional warm blankets, and any nighttime medications (include as-needed inhaler, pain pill, etc) that you may regularly take. Also, be sure to eat dinner before you arrive as we generally do not provide meals inside the sleep testing center. Lastly, in order to fall asleep faster in the sleep testing center, we advise patients to wake up 2 hours earlier on the morning of scheduled testing and avoid napping 2 days prior testing. Sometimes, your sleep provider may prescribe a sleep aid to be taken at lights out in the sleep testing center. If you are taking a sleep aid, consider having somebody pick you up after the sleep testing.    Overnight sleep studies may be scheduled on a weekday or weekend. We also perform daytime testing for shift workers on a case-by-case basis.    Once you have booked an appointment to do the sleep study, please contact my office for follow-up visit to discuss results.    On the other hand, if you have any of the following, please consider calling the sleep testing center to RESCHEDULE your sleep study appointment:  If you tested positive for COVID within 10 days of your sleep study appointment.  If you were exposed to somebody who was confirmed for COVID within 10 days of your sleep study appointment and now you are having symptoms of possible COVID  If you have fever>100F or any acute symptoms that you think will lead to poor sleep during testing (e.g. new or worsening stuffy nose not relieved by steroid nasal spray)  If you have traveled domestically or internationally in the last month and now you are having symptoms of possible COVID    You can also go to the following  EDUCATION WEBSITES for further information:   American Academy of Sleep Medicine http://sleepeducation.org  National Sleep Foundation: https://sleepfoundation.org  American Sleep Apnea Association: https://www.sleepapnea.org (for patients with sleep apnea)  Narcolepsy Network: https://www.narcolepsynetwork.org (for patients with narcolepsy)  [employer]: https://www.[employer] Technologycolepsy.org (for patients with narcolepsy)  Hypersomnia Foundation: https://www.hypersomniafoundation.org (for patients with idiopathic hypersomnia)  RLS foundation: https://www.rls.org (for patients with restless leg syndrome)    IMPORTANT INFORMATION     Call 911 for medical emergencies.  Our offices are generally open from Monday-Friday, 8 am - 5 pm.   There are no supporting services by either the sleep doctors or their staff on weekends and Holidays, or after 5 PM on weekdays.   If you need to get in touch with me, you may either call my office number or you can use [employer].  If a referral for a test, for CPAP, or for another specialist was made, and you have not heard about scheduling this within a week, please call scheduling at [phone]).  If you are unable to make your appointment for clinic or an overnight study, kindly call the office or sleep testing center at least 48 hours in advance to cancel and reschedule.  If you are on CPAP, please bring your device's card and/or the device to each clinic appointment.   In case of problems with PAP machine or mask interface, please contact your [facility] (Durable Medical Equipment) company first. [facility] is the company who provides you the machine and/or PAP supplies.       PRESCRIPTIONS     We require 7 days advanced notice for prescription refills. If we do not receive the request in this time, we cannot guarantee that your medication will be refilled in time.    IMPORTANT PHONE NUMBERS     Sleep Medicine Clinic Fax: [phone]  Appointments (for Pediatric Sleep Clinic):  [phone] - option 1  Appointments (for Adult Sleep Clinic): [phone] - option 2  Appointments (For Sleep Studies): [phone] - option 3  Behavioral Sleep Medicine: [phone]  Sleep Surgery: [phone]  Nutrition Service: [phone]  Weight management clinics with endocrinology: [phone]  Bariatric Services: [phone] (includes weight loss medications and weight loss surgery)  [facility]: [phone] (offers holistic approaches to weight management)  ENT (Otolaryngology): [phone]  Headache Clinic (Neurology): [phone]  Neurology: [phone]  Psychiatry: [phone]  Pulmonary Function Testing (PFT) Center: [phone]  Pulmonary Medicine: [phone]  [facility] [employer]): [phone]      OUR SLEEP TESTING LOCATIONS     Our team will contact you to schedule your sleep study, however, you can contact us as follow:  Main Phone Line (scheduling only): [phone]), option 3  Adult and Pediatric Locations  [facility] (6 years and older): [facility] - 4th floor ([address] [place] [address]) After hours line: [phone]  [facility] (Main campus: All ages): [facility], 6th floor. After hours line: [phone]   [place] (5 years and older; younger considered on case-by-case basis): [address]; [address], [address]. Scheduling  After hours line: [phone]   [place] (6 years and older): [address]; [address]; [address]   [place] (6 years and older): [address] [facility], [address]  After hours line: [phone]   [place] (13 years and older) in [place]: [address] [facility], 2nd floor  After hours line: [phone]   [place] (13 year and older): [address]  After hours line: [phone]     Adult Only Locations:   [place] (18 years and older): [address] [place] [address], 2nd floor   [place] (18  "years and older): [address] [facility]; 4th floor  After hours line: [phone]  [facility] (18 years and older) at [place]: [address] [facility]  After hours line: [phone]     CONTACTING YOUR SLEEP MEDICINE PROVIDER AND SLEEP TEAM      For issues with your machine or mask interface, please call your DME provider first. [employer] stands for durable medical company. DME is the company who provides you the machine and/or PAP supplies / accessories.   To schedule, cancel, or reschedule SLEEP STUDY APPOINTMENTS, please call the Main Phone Line at [phone] - option 3.   To schedule, cancel, or reschedule CLINIC APPOINTMENTS, you can do it in \"[employer]\", call [phone] to speak with my  (Judy Giles), or call the Main Phone Line at [phone] - option 2  For CLINICAL QUESTIONS or MEDICATION REFILLS, please call direct line for Adult Sleep Nurses at [phone].   Lastly, you can also send a message directly to your provider through \"My Chart\", which is the email service through your  Records Account: [email or web address]       Here at [facility], we wish you a restful sleep!   "

## 2024-04-22 ENCOUNTER — OFFICE VISIT (OUTPATIENT)
Dept: PRIMARY CARE | Facility: CLINIC | Age: 39
End: 2024-04-22
Payer: COMMERCIAL

## 2024-04-22 VITALS
DIASTOLIC BLOOD PRESSURE: 86 MMHG | BODY MASS INDEX: 41.59 KG/M2 | SYSTOLIC BLOOD PRESSURE: 126 MMHG | OXYGEN SATURATION: 98 % | WEIGHT: 265 LBS | HEART RATE: 84 BPM | HEIGHT: 67 IN

## 2024-04-22 DIAGNOSIS — I10 BENIGN ESSENTIAL HYPERTENSION: ICD-10-CM

## 2024-04-22 DIAGNOSIS — Z12.31 ENCOUNTER FOR SCREENING MAMMOGRAM FOR MALIGNANT NEOPLASM OF BREAST: Primary | ICD-10-CM

## 2024-04-22 DIAGNOSIS — R59.0 AXILLARY LYMPHADENOPATHY: ICD-10-CM

## 2024-04-22 DIAGNOSIS — E06.3 HASHIMOTO'S THYROIDITIS: ICD-10-CM

## 2024-04-22 PROCEDURE — 1036F TOBACCO NON-USER: CPT | Performed by: STUDENT IN AN ORGANIZED HEALTH CARE EDUCATION/TRAINING PROGRAM

## 2024-04-22 PROCEDURE — 3079F DIAST BP 80-89 MM HG: CPT | Performed by: STUDENT IN AN ORGANIZED HEALTH CARE EDUCATION/TRAINING PROGRAM

## 2024-04-22 PROCEDURE — 99214 OFFICE O/P EST MOD 30 MIN: CPT | Performed by: STUDENT IN AN ORGANIZED HEALTH CARE EDUCATION/TRAINING PROGRAM

## 2024-04-22 PROCEDURE — 3074F SYST BP LT 130 MM HG: CPT | Performed by: STUDENT IN AN ORGANIZED HEALTH CARE EDUCATION/TRAINING PROGRAM

## 2024-04-22 RX ORDER — LOSARTAN POTASSIUM 25 MG/1
25 TABLET ORAL DAILY
Qty: 90 TABLET | Refills: 0 | Status: SHIPPED | OUTPATIENT
Start: 2024-04-22 | End: 2024-07-21

## 2024-04-22 ASSESSMENT — ENCOUNTER SYMPTOMS
VOMITING: 0
DYSURIA: 0
PALPITATIONS: 0
SHORTNESS OF BREATH: 0
ADENOPATHY: 1
WOUND: 0
SINUS PRESSURE: 0
DYSPHORIC MOOD: 0
SINUS PAIN: 0
FATIGUE: 0
MYALGIAS: 0
RHINORRHEA: 0
CHILLS: 0
NERVOUS/ANXIOUS: 0
WHEEZING: 0
DIARRHEA: 0
NAUSEA: 0
COUGH: 0
CONSTIPATION: 0
FEVER: 0
ARTHRALGIAS: 0
FREQUENCY: 0

## 2024-04-22 ASSESSMENT — PATIENT HEALTH QUESTIONNAIRE - PHQ9
1. LITTLE INTEREST OR PLEASURE IN DOING THINGS: NOT AT ALL
SUM OF ALL RESPONSES TO PHQ9 QUESTIONS 1 AND 2: 0
2. FEELING DOWN, DEPRESSED OR HOPELESS: NOT AT ALL

## 2024-04-22 NOTE — PROGRESS NOTES
CERTIFICATE OF RETURN TO WORK    April 12, 2019      Re:   Collette M Kate  [address]  [facility] [address]                        This is to certify that Collette OLENA Love has been under Dr. Westley arango and is excused from work from 4/11/2019-4/17/2019.    RESTRICTIONS: Off Work      REMARKS: N/A        SIGNATURE:___________________________________________,   4/12/2019    Savannah Quintero MA          Orthopaedics  [address] [place] [address]  Phone: [phone]  Fax:     [phone]       "Subjective   Patient ID: Tiffanie Burdick is a 39 y.o. female with past medical history of HTN, hypothyroidism who presents for Follow-up.    Here today to follow up regarding HTN.  Has been taking losartan.  Checking BP at home, typically 120's/80's.  Minimal concern for possible pregnancy with this medication as she is monogamous with her  who has had a vasectomy.      Patient has also noticed an enlarged right axillary lymph node and pain in her right axilla for approximately the past week.  Has not had mammogram yet.         Review of Systems   Constitutional:  Negative for chills, fatigue and fever.   HENT:  Negative for congestion, rhinorrhea, sinus pressure and sinus pain.    Respiratory:  Negative for cough, shortness of breath and wheezing.    Cardiovascular:  Negative for chest pain, palpitations and leg swelling.   Gastrointestinal:  Negative for constipation, diarrhea, nausea and vomiting.   Genitourinary:  Negative for dysuria, frequency and urgency.   Musculoskeletal:  Negative for arthralgias and myalgias.   Skin:  Negative for pallor, rash and wound.   Hematological:  Positive for adenopathy.   Psychiatric/Behavioral:  Negative for dysphoric mood. The patient is not nervous/anxious.        Objective     Visit Vitals  /86   Pulse 84   Ht 1.702 m (5' 7\")   Wt 120 kg (265 lb)   SpO2 98%   BMI 41.50 kg/m²   Smoking Status Never   BSA 2.38 m²         Physical Exam  Constitutional:       Appearance: Normal appearance. She is obese.   HENT:      Head: Normocephalic and atraumatic.      Right Ear: External ear normal.      Left Ear: External ear normal.      Nose: Nose normal.      Mouth/Throat:      Mouth: Mucous membranes are moist.      Pharynx: Oropharynx is clear.   Eyes:      Conjunctiva/sclera: Conjunctivae normal.   Cardiovascular:      Rate and Rhythm: Normal rate and regular rhythm.      Pulses: Normal pulses.      Heart sounds: Normal heart sounds.   Pulmonary:      Effort: Pulmonary " effort is normal.      Breath sounds: Normal breath sounds.   Abdominal:      General: There is no distension.      Palpations: Abdomen is soft.      Tenderness: There is no abdominal tenderness.   Lymphadenopathy:      Upper Body:      Right upper body: Axillary adenopathy present.      Left upper body: No axillary adenopathy.   Skin:     General: Skin is warm.   Neurological:      Mental Status: She is alert.   Psychiatric:         Mood and Affect: Mood normal.         Behavior: Behavior normal.         Assessment/Plan   Problem List Items Addressed This Visit       Benign essential hypertension    Relevant Medications    losartan (Cozaar) 25 mg tablet    Other Relevant Orders    Comprehensive metabolic panel    Hashimoto's thyroiditis    Relevant Orders    TSH     Other Visit Diagnoses       Encounter for screening mammogram for malignant neoplasm of breast    -  Primary    Relevant Orders    BI mammo bilateral diagnostic    Axillary lymphadenopathy        Relevant Orders    BI mammo bilateral diagnostic        Suspect axillary lymph node may be from irritation due to shaving the area, however as patient has not yet had a routine mammogram, will order one today.      To get TSH done in 1 month,     Patient seen and discussed with attending physician, Dr Carlos Bocanegra, DO PGY3  Family Medicine

## 2024-05-02 ENCOUNTER — APPOINTMENT (OUTPATIENT)
Dept: SURGERY | Facility: CLINIC | Age: 39
End: 2024-05-02
Payer: COMMERCIAL

## 2024-05-03 ENCOUNTER — HOSPITAL ENCOUNTER (OUTPATIENT)
Dept: RADIOLOGY | Facility: HOSPITAL | Age: 39
Discharge: HOME | End: 2024-05-03
Payer: COMMERCIAL

## 2024-05-03 VITALS — WEIGHT: 260 LBS | HEIGHT: 67 IN | BODY MASS INDEX: 40.81 KG/M2

## 2024-05-03 DIAGNOSIS — R89.0 ABNORMAL LEVEL OF ENZYMES IN SPECIMENS FROM OTHER ORGANS, SYSTEMS AND TISSUES: ICD-10-CM

## 2024-05-03 DIAGNOSIS — Z12.31 ENCOUNTER FOR SCREENING MAMMOGRAM FOR MALIGNANT NEOPLASM OF BREAST: ICD-10-CM

## 2024-05-03 DIAGNOSIS — R59.0 AXILLARY LYMPHADENOPATHY: ICD-10-CM

## 2024-05-03 PROCEDURE — 77062 BREAST TOMOSYNTHESIS BI: CPT

## 2024-05-03 PROCEDURE — 76642 ULTRASOUND BREAST LIMITED: CPT | Performed by: RADIOLOGY

## 2024-05-03 PROCEDURE — 76982 USE 1ST TARGET LESION: CPT | Mod: RT

## 2024-05-03 PROCEDURE — 76642 ULTRASOUND BREAST LIMITED: CPT | Mod: RT

## 2024-05-03 PROCEDURE — 77062 BREAST TOMOSYNTHESIS BI: CPT | Performed by: RADIOLOGY

## 2024-05-03 PROCEDURE — 77066 DX MAMMO INCL CAD BI: CPT | Performed by: RADIOLOGY

## 2024-05-07 ENCOUNTER — TELEPHONE (OUTPATIENT)
Dept: SURGERY | Facility: CLINIC | Age: 39
End: 2024-05-07
Payer: COMMERCIAL

## 2024-05-07 NOTE — TELEPHONE ENCOUNTER
This RN attempted to call patient to go over clearances needed for bariatric surgery. RN left detailed voicemail for patient in regards to clearances still needed. RN stated in voicemail that there will be an updated letter placed in [facility] and to call the bariatric office with any questions or concerns.

## 2024-05-07 NOTE — LETTER
Steve Burdick!    Thank you for choosing [facility] to guide you on your journey to Bariatric Surgery and a healthier you!      The following items remain for your bariatric surgery steps:   Psychological evaluation:  Please refer to the list of providers in your new patient handbook for scheduling.  If you are currently established with a mental health provider, you may see them for this evaluation if you wish; however, some providers may not feel comfortable performing this clearance, so you may need to reach out to one of the providers on the list.  This evaluation is only good for 6 months.     Sleep Appointment: you will need a sleep study and treatment, if positive, before surgery. Please call [phone] to schedule your sleep study, and [phone] to schedule with sleep medicine for 2 weeks after your sleep study.  If you have sleep apnea, they will order your CPAP device.  You will be required to remain compliant with therapy before surgical scheduling.    Letter of support for bariatric surgery from your primary care physician (PCP).  This is included in the Initial Visit Packet attachment.  Please print and provide to your PCP and have it faxed to [phone].    Nutrition clearance: You may be required to follow up prior to being cleared by the dietitian. We encourage you to continue following with your dietitian or attend Medically Supervised Weight Loss classes until you are approved for surgery.  You also will attend a New Patient Nutrition zoom class prior to being ready for surgery.    Please direct any questions you might have to your navigator, Jessica Singh, at [phone].   Mya,  Jessica Singh, AMENA

## 2024-05-14 ENCOUNTER — ALLIED HEALTH (OUTPATIENT)
Dept: INTEGRATIVE MEDICINE | Facility: CLINIC | Age: 39
End: 2024-05-14
Payer: COMMERCIAL

## 2024-05-14 DIAGNOSIS — Z71.3 NUTRITIONAL COUNSELING: ICD-10-CM

## 2024-05-14 PROCEDURE — 99215 OFFICE O/P EST HI 40 MIN: CPT | Performed by: NURSE PRACTITIONER

## 2024-05-14 SDOH — ECONOMIC STABILITY: FOOD INSECURITY: WITHIN THE PAST 12 MONTHS, THE FOOD YOU BOUGHT JUST DIDN'T LAST AND YOU DIDN'T HAVE MONEY TO GET MORE.: NEVER TRUE

## 2024-05-14 SDOH — SOCIAL STABILITY: SOCIAL NETWORK: I HAVE TROUBLE DOING ALL OF THE ACTIVITIES WITH FRIENDS THAT I WANT TO DO: SOMETIMES

## 2024-05-14 SDOH — SOCIAL STABILITY: SOCIAL NETWORK: PROMIS ABILITY TO PARTICIPATE IN SOCIAL ROLES & ACTIVITIES T-SCORE: 45

## 2024-05-14 SDOH — ECONOMIC STABILITY: FOOD INSECURITY: WITHIN THE PAST 12 MONTHS, YOU WORRIED THAT YOUR FOOD WOULD RUN OUT BEFORE YOU GOT MONEY TO BUY MORE.: SOMETIMES TRUE

## 2024-05-14 SDOH — SOCIAL STABILITY: SOCIAL NETWORK

## 2024-05-14 SDOH — SOCIAL STABILITY: SOCIAL NETWORK: I HAVE TROUBLE DOING ALL OF THE FAMILY ACTIVITIES THAT I WANT TO DO: SOMETIMES

## 2024-05-14 SDOH — SOCIAL STABILITY: SOCIAL NETWORK: I HAVE TROUBLE DOING ALL OF MY REGULAR LEISURE ACTIVITIES WITH OTHERS: SOMETIMES

## 2024-05-14 SDOH — SOCIAL STABILITY: SOCIAL NETWORK: I HAVE TROUBLE DOING ALL OF MY USUAL WORK (INCLUDE WORK AT HOME): SOMETIMES

## 2024-05-14 ASSESSMENT — ANXIETY QUESTIONNAIRES
PAST MONTH HOW OFTEN HAVE YOU FELT DIFFICULTIES WERE PILING UP SO HIGH THAT YOU COULD NOT OVERCOME THEM: 2 - SOMETIMES
PAST MONTH HOW OFTEN HAVE YOU FELT THAT THINGS WERE GOING YOUR WAY: 3 - FAIRLY OFTEN
PAST MONTH HOW OFTEN HAVE YOU FELT THAT YOU WERE UNABLE TO CONTROL THE IMPORTANT THINGS IN YOUR LIFE: 2 - SOMETIMES
PAST MONTH HOW OFTEN HAVE YOU FELT THAT YOU WERE UNABLE TO CONTROL THE IMPORTANT THINGS IN YOUR LIFE: 2 - SOMETIMES
PAST MONTH HOW OFTEN HAVE YOU FELT CONFIDENT ABOUT YOUR ABILITY TO HANDLE YOUR PROBLEMS: 2 - SOMETIMES
PAST MONTH HOW OFTEN HAVE YOU FELT CONFIDENT ABOUT YOUR ABILITY TO HANDLE YOUR PROBLEMS: 2 - SOMETIMES

## 2024-05-14 ASSESSMENT — PROMIS GLOBAL HEALTH SCALE
RATE_PHYSICAL_HEALTH: FAIR
CARRYOUT_PHYSICAL_ACTIVITIES: COMPLETELY
EMOTIONAL_PROBLEMS: SOMETIMES
CARRYOUT_SOCIAL_ACTIVITIES: VERY GOOD
RATE_AVERAGE_FATIGUE: MODERATE
RATE_MENTAL_HEALTH: GOOD
RATE_QUALITY_OF_LIFE: GOOD
RATE_AVERAGE_PAIN: 1
RATE_SOCIAL_SATISFACTION: VERY GOOD
RATE_GENERAL_HEALTH: FAIR

## 2024-05-14 NOTE — PATIENT INSTRUCTIONS
Plan:   See sleep hygiene and see if you can make any changes to your current schedule to create the best environment for sleep.   Start practicing intentional deep breathing methods like 4-7-8 breathing method at least 4 times daily to help regulate cortisol levels. See attachment for reference   Consider practicing guided meditation ten minutes a day to add to benefits. Recommended apps: Calm, Headspace, Insight timer, fitmind.   Food log   See protein handout

## 2024-05-14 NOTE — PROGRESS NOTES
Tiffanie  presents for a new patient visit.     Today we reviewed pillars of Lifestyle Medicine: Sleep restoration, Nutrition, Stress Management, Interpersonal and Social Connection, Avoidance of Risky Behavior. The benefits were discussed for each pillar and how incorporation of changes in lifestyle would benefit the patient and his/her lifestyle choices.     Work: [employer]- Full time Monday-Saturday 9-5 Lives with: - 6 kids (5,7,9,12,13,15) Personal connection level: 6/10. Tons of friends but not a lot of time.     Somatic complaints:   Admits weight gain     Goal of the visit: Starting whole process for bariatric surgery. Family history of obesity.    Sleep hygiene:  Has a sleep test coming up, admits being tired all the time.   7 hours of uninterrupted sleep on most days. Once a week a kid will wake her up.   1030-630- 8 hours.   1/2 cup in the morning    Stress management: (Identifiable stressors)   Are you currently using any stress management tools/resources     Supplements:   Centrum - advised to finish and not take   -Vit D liquid- 5 drops every day  -Ortho thyroid to support thyroid tissue  -Innate multi -   -EBV nosode- one dropper full a day, during a flare.     Nutrition:     Plan:   See sleep hygiene and see if you can make any changes to your current schedule to create the best environment for sleep.   Start practicing intentional deep breathing methods like 4-7-8 breathing method at least 4 times daily to help regulate cortisol levels. See attachment for reference   Consider practicing guided meditation ten minutes a day to add to benefits. Recommended apps: Calm, Headspace, Insight timer, fitmind.   Food log   See protein handout    No follow-ups on file.     [email or web address]

## 2024-05-15 ENCOUNTER — APPOINTMENT (OUTPATIENT)
Dept: SLEEP MEDICINE | Facility: CLINIC | Age: 39
End: 2024-05-15
Payer: COMMERCIAL

## 2024-05-21 ENCOUNTER — NUTRITION (OUTPATIENT)
Dept: SURGERY | Facility: CLINIC | Age: 39
End: 2024-05-21
Payer: COMMERCIAL

## 2024-05-21 VITALS — BODY MASS INDEX: 40.02 KG/M2 | WEIGHT: 255 LBS | HEIGHT: 67 IN

## 2024-05-21 NOTE — PROGRESS NOTES
"PREOPERATIVE, MULTIDISCIPLINARY, MEDICALLY SUPERVISED, REDUCED CALORIE DIET, BEHAVIOR MODIFICATION AND EXERCISE PROGRAM    S:  The pt noted that she has had a busy couple of months. Has been trying to work on stress eating. Working with a counselor to help her with the \"food noise\". Trying to learn when to walk away from food. Having a plan is what helps her be the most successful like having quick grab and go snacks/meals. Has the mind set that \"if she messed up, she can start again tomorrow\" which may cause her to over indulge for the rest of the day. Has been learning from every experience. B: eggs w/green beans, L: salad w/chicken, D: chicken w/green beans, S: greek yogurt or fruit and a cheese stick or pretzels or cut up veggies or shrimp. Has been meeting her protein goals. The pt has been practicing post op behaviors.  Has been drinking 64oz with decaf coffee, green tea, and water with true lemon. Continues to take a MVI.    O:    Wt: 255lbs       Ht: 1.702 m (5' 7\")       Body mass index is 39.94 kg/m².    Goal: 5% body weight loss over the course of program    Dietary recommendation:   1. Continue to drink 64oz of noncarbonated/caffeine free/sugar free beverages   2. Practice the 30-30-30 rule by drinking between meals.  3. Structure your meal plan - have 3 meals and 1 snack daily.  4. Have balanced meals that always contain a good source of protein.  5. Increase intake of non-starchy vegetables.  Have 5 servings fruits and vegetables daily.   6. Continue to work with your counselor.   7. Continue exercise routine. Increase physical activity by 10-15 minutes to an end goal of 60 minutes 5 x per week.    Group Topic: Put Your Beverage to the Test  Behavioral recommendation: Pt is encouraged to identify appropriate beverages that can be tolerated post weight loss surgery.    A/P: Pt appears to have a good understanding of how to choose beverages that are appropriate for the weight loss surgery patient.  Pt " has a goal to consume a minimum of 8 cups of sugar free, decaffeinated, non-carbonated beverages daily. The pt is doing well and meeting her goals. Is cleared from a nutrition standpoint. Will call the pt to review the 2 week pre op diet.     Exercise: video exercises/walking 3x per week for 30 minutes     She Allen RDN, LD

## 2024-05-22 ENCOUNTER — CLINICAL SUPPORT (OUTPATIENT)
Dept: SLEEP MEDICINE | Facility: HOSPITAL | Age: 39
End: 2024-05-22
Payer: COMMERCIAL

## 2024-05-22 DIAGNOSIS — G47.30 SLEEP DISORDER BREATHING: ICD-10-CM

## 2024-05-22 PROCEDURE — 95806 SLEEP STUDY UNATT&RESP EFFT: CPT | Performed by: INTERNAL MEDICINE

## 2024-06-05 ENCOUNTER — TELEPHONE (OUTPATIENT)
Dept: SURGERY | Facility: CLINIC | Age: 39
End: 2024-06-05
Payer: COMMERCIAL

## 2024-06-05 NOTE — LETTER
Steve Burdick!    Thank you for choosing [facility] to guide you on your journey to Bariatric Surgery and a healthier you!      The following items remain for your bariatric surgery steps:   Psychological evaluation:  Please refer to the list of providers in your new patient handbook for scheduling.  If you are currently established with a mental health provider, you may see them for this evaluation if you wish; however, some providers may not feel comfortable performing this clearance, so you may need to reach out to one of the providers on the list.  This evaluation is only good for 6 months.     Sleep Appointment: you will need a sleep study and treatment, if positive, before surgery. Please call [phone] to schedule your sleep study, and [phone] to schedule with sleep medicine for 2 weeks after your sleep study.  If you have sleep apnea, they will order your CPAP device.  You will be required to remain compliant with therapy before surgical scheduling.    Please direct any questions you might have to your navigator, Jessica Singh, at [phone].   Kindly,  Jessica Singh, RN

## 2024-06-05 NOTE — TELEPHONE ENCOUNTER
This RN attempted to call patient to go over clearances needed for bariatric surgery. RN left detailed voicemail with the clearances needed for bariatric surgery. RN provided patient with bariatric office contact information   To call with any questions or concerns.

## 2024-06-11 ENCOUNTER — LAB (OUTPATIENT)
Dept: LAB | Facility: LAB | Age: 39
End: 2024-06-11
Payer: COMMERCIAL

## 2024-06-11 ENCOUNTER — TELEMEDICINE CLINICAL SUPPORT (OUTPATIENT)
Dept: SURGERY | Facility: CLINIC | Age: 39
End: 2024-06-11
Payer: COMMERCIAL

## 2024-06-11 DIAGNOSIS — I10 BENIGN ESSENTIAL HYPERTENSION: ICD-10-CM

## 2024-06-11 DIAGNOSIS — E06.3 HASHIMOTO'S THYROIDITIS: ICD-10-CM

## 2024-06-11 DIAGNOSIS — E66.01 MORBID OBESITY (MULTI): ICD-10-CM

## 2024-06-11 DIAGNOSIS — E03.9 HYPOTHYROIDISM, UNSPECIFIED TYPE: ICD-10-CM

## 2024-06-11 LAB
ALBUMIN SERPL BCP-MCNC: 4.5 G/DL (ref 3.4–5)
ALP SERPL-CCNC: 44 U/L (ref 33–110)
ALT SERPL W P-5'-P-CCNC: 22 U/L (ref 7–45)
ANION GAP SERPL CALC-SCNC: 14 MMOL/L (ref 10–20)
AST SERPL W P-5'-P-CCNC: 19 U/L (ref 9–39)
BILIRUB SERPL-MCNC: 0.6 MG/DL (ref 0–1.2)
BUN SERPL-MCNC: 11 MG/DL (ref 6–23)
CALCIUM SERPL-MCNC: 9.1 MG/DL (ref 8.6–10.3)
CHLORIDE SERPL-SCNC: 104 MMOL/L (ref 98–107)
CO2 SERPL-SCNC: 25 MMOL/L (ref 21–32)
CREAT SERPL-MCNC: 0.82 MG/DL (ref 0.5–1.05)
EGFRCR SERPLBLD CKD-EPI 2021: >90 ML/MIN/1.73M*2
GLUCOSE SERPL-MCNC: 91 MG/DL (ref 74–99)
POTASSIUM SERPL-SCNC: 4.3 MMOL/L (ref 3.5–5.3)
PROT SERPL-MCNC: 6.6 G/DL (ref 6.4–8.2)
SODIUM SERPL-SCNC: 139 MMOL/L (ref 136–145)
TSH SERPL-ACNC: 10.93 MIU/L (ref 0.44–3.98)

## 2024-06-11 PROCEDURE — 80053 COMPREHEN METABOLIC PANEL: CPT

## 2024-06-11 PROCEDURE — 36415 COLL VENOUS BLD VENIPUNCTURE: CPT

## 2024-06-11 PROCEDURE — 84443 ASSAY THYROID STIM HORMONE: CPT

## 2024-06-11 PROCEDURE — 80323 ALKALOIDS NOS: CPT

## 2024-06-12 NOTE — PROGRESS NOTES
Subjective   Patient ID: Tiffanie Burdick is a 39 y.o. female with past medical history of who presents for No chief complaint on file..  HPI    Review of Systems    Objective     Visit Vitals  OB Status Having periods   Smoking Status Never         Physical Exam    Assessment/Plan   Problem List Items Addressed This Visit    None      Patient seen and discussed with attending physician, Dr Carlos Bocanegra, DO PGY2  Family Medicine

## 2024-06-13 ENCOUNTER — TELEPHONE (OUTPATIENT)
Dept: PRIMARY CARE | Facility: CLINIC | Age: 39
End: 2024-06-13
Payer: COMMERCIAL

## 2024-06-13 RX ORDER — LEVOTHYROXINE SODIUM 100 UG/1
100 TABLET ORAL DAILY
Qty: 60 TABLET | Refills: 0 | Status: SHIPPED | OUTPATIENT
Start: 2024-06-13 | End: 2024-08-12

## 2024-06-13 NOTE — TELEPHONE ENCOUNTER
Patient called during lunch stating she was calling the office back.  She was calling back regarding lab results

## 2024-06-13 NOTE — RESULT ENCOUNTER NOTE
Missed patient callback.  Returned call and couldn't reach.  Increased levothyroxine dose script sent to pharmacy.  Recheck blood work in 6 weeks.

## 2024-06-15 LAB
COTININE SERPL-MCNC: <5 NG/ML
NICOTINE SERPL-MCNC: <5 NG/ML

## 2024-07-02 ENCOUNTER — APPOINTMENT (OUTPATIENT)
Dept: INTEGRATIVE MEDICINE | Facility: CLINIC | Age: 39
End: 2024-07-02
Payer: COMMERCIAL

## 2024-07-08 ENCOUNTER — APPOINTMENT (OUTPATIENT)
Dept: BEHAVIORAL HEALTH | Facility: CLINIC | Age: 39
End: 2024-07-08
Payer: COMMERCIAL

## 2024-07-12 NOTE — PROGRESS NOTES
[facility] Sleep [facility]  [facility]  [facility]  [address] [place] [address]     [facility]  Follow Up Visit Note  Virtual or Telephone Consent    An interactive audio and video telecommunication system which permits real time communications between the patient (at the originating site) and provider (at the distant site) was utilized to provide this telehealth service.   Verbal consent was requested and obtained from Tiffanie Burdick on this date, 07/19/24 for a telehealth visit.               Subjective  Patient ID: Tiffanie Burdick is a 39 y.o. female with past medical history significant for Morbid Obesity, Depression. Anxiety, Headache, and Sleep disorder breathing.      7/19/24: UPDATED : The patient had a virtual visit with me to review her sleep study to treat her sleep apnea before Bariatric Surgery. The desensitization strategy, 30-day free mask return policy, and insurance requirements are all discussed with the patient. The patient verbalized understanding it. Her ESS is 3 today.     4/19/2024: The patient is here alone today and was referred by bariatrics Zachary Kwok MD, for a comprehensive sleep medicine evaluation due to snoring and daytime sleepiness and a preoperative risk evaluation before bariatric surgery. She reports her general sleepiness and thinks that was from her six kids make her tired. She also has a family history of HOSSEIN. Today, she came here to establish care before bariatric surgery. ESS is 13, TRUPTI is 15, and the neck circumference is 16 inches today.     HPI  The patient had had these symptoms for at least one year.   The patient has never had a sleep study done yet.      SLEEP STUDY HISTORY: (personally reviewed raw data such as interpretation report, data sheet, hypnogram, and titration table if available and applicable)  5/22/24: Home Sleep Study: AHI 3%: 13.5/hr, Supine AHI 3%: 14.6%, AHI  4%:8.1/hr, Supine AHI 4%: 8.8/hr, Ajith: 82.6%, <88%: 3.7 minutes     SLEEP-WAKE SCHEDULE  Bedtime: 10 PM on weekdays, 10:30 PM  on weekends  Subjective sleep latency: 10 minutes  Problems falling asleep: No  Number of awakenings: 2 times per night spontaneously for unknown reasons  Falls back asleep in 2 hours  Problems staying asleep: Yes  Final wake time: 6:30 AM on weekdays, 7:30 AM on weekends  Out of bed time: 6:35 AM on weekdays, 7:30 AM on weekends  Shift work: No  Naps: No  Average sleep duration (excluding naps): 8 hours     SLEEP ENVIRONMENT  Sleep location: bed  Sleep status: sleeps with   Room is dark:  Yes  Room is quiet: Yes  Room is cool: Yes  Bed comfort: good     SLEEP HABITS:   Activities before bedtime: read a book  Activities in bed: read a book  Preferred sleep position: back and side     SLEEP ROS:  Night symptoms: POSITIVE for snoring  Morning symptoms: POSITIVE for unrefreshing sleep, morning headache  Daytime symptoms POSITIVE for excessive daytime sleepiness and fatigue  Hypersomnia / narcolepsy symptoms: Patient denies symptoms of a hypersomnolence disorder such as sleep paralysis, sleep-related hallucinations, recurrent sleep attacks, automatic behaviors, and cataplexy.   RLS symptoms: Patient denies RLS symptoms.  Movements in sleep: Patient denies problematic movements in sleep.  Parasomnia symptoms: Patient denies symptoms of parasomnia.     WEIGHT: gained 20 lbs in 6 months      REVIEW OF SYSTEMS: All other systems have been reviewed and are negative.     PERTINENT SOCIAL HISTORY:  Occupation: [employer]  Smoking: No   ETOH: Yes, socialally  Marijuana: No   Caffeine: Yes  Sleep aids: No   Claustrophobia: No      PERTINENT PAST SURGICAL HISTORY:  non-contributory     PERTINENT FAMILY HISTORY:  sleep apnea- father, sister     Active Problems, Allergy List, Medication List, and PMH/PSH/FH/Social Hx have been reviewed and reconciled in chart. No significant changes unless  documented in the pertinent chart section. Updates made when necessary.          Objective     Physical Exam  Constitutional:alert and oriented to time, place, and person        Assessment/Plan  Tiffanie Burdick is a 39 y.o. female who is seen to evaluate for possible obstructive sleep apnea. The pathophysiology of sleep apnea, diagnostic testing (HST vs PSG), treatment options (PAP, oral appliance, surgery, hypoglossal nerve stimulator called Inspire), and supportive management (weight loss, positional therapy, smoking cessation, avoidance of alcohol and sedatives) were discussed with the patient in detail. Risk factors of sleep apnea as well as cardiometabolic and neurocognitive sequelae associated with untreated sleep apnea were also discussed. Lastly, patient was advised to avoid driving vehicle or operating heavy machinery when sleepy.      Tiffanie Burdick with the following problems:     # OBSTRUCTIVE SLEEP APNEA:   -Start 5-15  cmH20 auto PAP through [employer]. (Expedite it)  -Sleep apnea and PAP therapy education were provided at length in the clinic today. Tiffanie Burdick verbalized understanding.  -Emphasized diet, exercise, and weight loss in the clinic, as were non-supine sleep, avoiding alcohol in the late evening, and driving or operating heavy machinery when sleepy.  Tiffanie Burdickverbalizes understanding of the above instructions and risks.      # CHRONIC SLEEP MAINTENANCE INSOMNIA:  -likely due to poor sleep hygiene, and untreated sleep apnea  -TRUPTI: 15  -Will reevaluate after study/treatment     # DEPRESSION :   -Tiffanie Pattreson not taking any medication and doing well.  -Denies HI/SI     # HYPERTENSION:  -Blood pressure was controlled today.  -Denies headache, palpitation, and syncope in the clinic.  -Follows with PCP/ Cardiology     # MORBID OBESITY:  -with a BMI of  40.72.         Bicarbonate   Date Value Ref Range Status   02/23/2024 27 21 - 32 mmol/L Final   -Encourage to have  regular exercise to manage weight well.  -Bariatric surgery candidate.        RTC 2 months        All of patient's questions were answered. She verbalizes understanding and agreement with my assessment and plan.

## 2024-07-16 ENCOUNTER — APPOINTMENT (OUTPATIENT)
Dept: BEHAVIORAL HEALTH | Facility: CLINIC | Age: 39
End: 2024-07-16
Payer: COMMERCIAL

## 2024-07-16 DIAGNOSIS — G43.909 MIGRAINE WITHOUT STATUS MIGRAINOSUS, NOT INTRACTABLE, UNSPECIFIED MIGRAINE TYPE: ICD-10-CM

## 2024-07-16 DIAGNOSIS — I10 BENIGN ESSENTIAL HYPERTENSION: ICD-10-CM

## 2024-07-16 DIAGNOSIS — D68.51 FACTOR V LEIDEN (MULTI): ICD-10-CM

## 2024-07-16 DIAGNOSIS — F41.8 DEPRESSION WITH ANXIETY: ICD-10-CM

## 2024-07-16 DIAGNOSIS — E03.9 HYPOTHYROIDISM, ADULT: ICD-10-CM

## 2024-07-16 DIAGNOSIS — F54 PSYCHOLOGICAL FACTORS AFFECTING MEDICAL CONDITION: ICD-10-CM

## 2024-07-16 DIAGNOSIS — E66.01 MORBID OBESITY (MULTI): Primary | ICD-10-CM

## 2024-07-16 PROCEDURE — 90791 PSYCH DIAGNOSTIC EVALUATION: CPT | Performed by: PSYCHOLOGIST

## 2024-07-16 PROCEDURE — 3008F BODY MASS INDEX DOCD: CPT | Performed by: PSYCHOLOGIST

## 2024-07-16 NOTE — PROGRESS NOTES
Pre-Bariatric Surgery Psychological Evaluation    Session Start Time: 8 AM  Session End Time: 8:35 AM    Televideo Informed Consent for psychological evaluation was reviewed with the patient as follows:  There are potential benefits and risks of the use of telephone or video-conferencing that differ from in-person sessions. Specifically, the telephone or televideo system we are using may not be HIPPA compliant and may present limits to patient confidentiality. Confidentiality still applies for telepsychology services, and nobody will record the session without your permission.      Understanding and verbal agreement was attested to by the patient. Patient identity was confirmed using 3 sources, including telephone number, email address and date of birth. Provision of services via telehealth was necessitated by the restrictions on face-to-face visits accompanying the COVID-19 pandemic.     Non-secure Note: The patient has consented to an nonrestricted note.     I had the pleasure of seeing Ms. Tiffanie Burdick at your request for behavioral evaluation for appropriateness for bariatric surgery.  As you know, Ms. Burdick is a 39 y.o. who reports a current weight of 260 pounds.     Weight History  Ms. Burdick states that in high school she weighed about 140 pounds.  She states that   she gained weight through her 6 pregnancies and had the most trouble keeping the weight off after her last pregnancy.  She reports that her maximum weight is her current weight of 260 pounds.         Psychosocial History  Ms. Burdick states that she lives in [place] she reports that [place] with her  and their 6 children ages 5 through 15 years old.  They have an in-law suite that her parents live in and that her mother-in-law lives next-door.  She states that all her children are doing well.  She has a bachelor's degree in education but is currently working in a  shop although she does not really like that work and is discerning  what she would like to do next.  She reports that she grew up in [place] with her mother and father and 2 brothers and 1 sister.  One of her brothers committed suicide when he was 14 years old and she was 15 years old and this was very difficult event for the whole family.  She reports that she has a good relationship with her parents and her remaining siblings now.  She states that her childhood was free from any other trauma aside from losing her brother at such a young age.    Psychological Status: Ms. Burdick states that she has a history of anxiety going back several years but she feels that it has increased somewhat lately due to increased stressors in her life.  She has been in therapy in the past to help with this although 2 years ago her counselor moved out of state and she ended counseling at that time.  She has started seeing some of the counselor again recently but she is not sure if that is a good fit for her.  She reports that she would like to have some therapy support while he is going through her weight loss journey, particularly someone who specializes in helping with the weight loss process.  I let her know that I will look into who might be able to provide that therapy for her.  She states that her history is negative for obsessive-compulsive disorder, eating disorders, blair, thought disorders, and alcohol and drug abuse.     Mental Status Exam:  Orientation:  Alert. Oriented x3.  Memory: intact.  Attention/Concentration: Normal/ Good.  Appearance:  Well-groomed. Casually Dressed. Good hygiene.   Behavior/Attitude: Cooperative. Pleasant. Good eye contact.  Motor: Relaxed. Calm. Normal motor activity.   Speech: Regular rate and volume. Fluent. No pressure.   Mood: Euthymic  Affect: Congruent to stated mood.   Thought process: Goal-directed. Linear. Organized.  Thought content: No paranoia, delusion or ideas of reference. No hallucinations in auditory, visual or other sensory modalities.    Suicidal ideation: denied.  Homicidal ideation: denied.   Insight: Good  Judgment: Good  Fund of knowledge: Above Average    Behavioral issues relevant for candidacy for bariatric surgery include:     1) Motivation: Ms. Burdick states that she is highly motivated for surgery for health reasons.  She states that she does not have the energy she used to and also has high blood pressure which causes her to worry about her health in the future.  She wants to stay healthy for her 6 children.     2) History of compliance: Ms. Burdick reports no history of problems with compliance with medication regimens.    3) History of attempts to lose weight:  Ms. Burdick has tried and failed at more conservative approaches to weight loss.  She has tried several of the popular diets with some success, but she has always gained the weight back over time.      4) Coping resources and social support: Ms. Burdick feels able to cope with any challenges that might arise after the surgery and reports significant support from both family and friends in her pursuit of bariatric surgery.     5) Ability to maintain behavior post-surgery:  In my opinion, Ms. Burdick is well-prepared to handle the behavioral demands that are consequent to bariatric surgery.  She has been working with a nutritionist and has been doing a good job of putting the changes in place that are being recommended.  She also started journaling to help her track her eating habits.  Due to working part-time and having 6 children, it is difficult for her to fit her exercise routine into her schedule and we did some problem-solving to help her resolve that issue.  I also emphasized the importance of meal planning and preparation because of her busy schedule.  She has been using walking and exercise videos for her exercise routine so far and we discussed a plan for her to build it into her schedule more concretely.      6) Psychological contraindications to surgery:  A  comprehensive review of psychological symptoms reveals no contraindications to surgery.     Impressions    Tiffanie Burdick is able to provide informed consent and is an appropriate candidate for bariatric surgery from the psychological perspective.       Behavioral confirmation of her candidacy will come in the form of her ability to adhere to her current dietary plan. Should she fare poorly with this adherence trial, she will work with the therapist that we put in place for her or will pursue a follow-up visit with a provider from our office for support and therapy.      Additionally, we had an extended discussion about behavioral responses to surgery for which she should be vigilant.  We discussed the post-surgical risks of mood deterioration, substance misuse, the development of compulsive behaviors and the development of maladaptive coping responses.  She expressed understanding of these risks and agreed to work with the therapist that we are finding for her or to contact our office with appropriate haste if any of these maladaptive responses were to develop after surgery.       Thank you for allowing me to collaborate in the evaluation of your patient. Please feel free to contact me if I can provide any additional information.    Jon Tom, Ph.D.  Clinical Psychologist  [facility], # [id number] [facility]  (o) [phone]

## 2024-07-19 ENCOUNTER — OFFICE VISIT (OUTPATIENT)
Dept: SLEEP MEDICINE | Facility: CLINIC | Age: 39
End: 2024-07-19
Payer: COMMERCIAL

## 2024-07-19 ENCOUNTER — TELEPHONE (OUTPATIENT)
Dept: SURGERY | Facility: CLINIC | Age: 39
End: 2024-07-19

## 2024-07-19 VITALS — BODY MASS INDEX: 40.81 KG/M2 | HEIGHT: 67 IN | WEIGHT: 260 LBS

## 2024-07-19 DIAGNOSIS — I10 BENIGN ESSENTIAL HYPERTENSION: ICD-10-CM

## 2024-07-19 DIAGNOSIS — Z98.84 BARIATRIC SURGERY STATUS: ICD-10-CM

## 2024-07-19 DIAGNOSIS — G47.30 SLEEP DISORDER BREATHING: ICD-10-CM

## 2024-07-19 DIAGNOSIS — G47.00 INSOMNIA, UNSPECIFIED TYPE: ICD-10-CM

## 2024-07-19 DIAGNOSIS — G47.33 OSA (OBSTRUCTIVE SLEEP APNEA): Primary | ICD-10-CM

## 2024-07-19 DIAGNOSIS — G47.33 OBSTRUCTIVE SLEEP APNEA (ADULT) (PEDIATRIC): ICD-10-CM

## 2024-07-19 PROCEDURE — 1036F TOBACCO NON-USER: CPT

## 2024-07-19 PROCEDURE — 3008F BODY MASS INDEX DOCD: CPT

## 2024-07-19 PROCEDURE — G2211 COMPLEX E/M VISIT ADD ON: HCPCS

## 2024-07-19 PROCEDURE — 99213 OFFICE O/P EST LOW 20 MIN: CPT

## 2024-07-19 ASSESSMENT — SLEEP AND FATIGUE QUESTIONNAIRES
HOW LIKELY ARE YOU TO NOD OFF OR FALL ASLEEP WHILE SITTING AND TALKING TO SOMEONE: WOULD NEVER DOZE
ESS-CHAD TOTAL SCORE: 3
HOW LIKELY ARE YOU TO NOD OFF OR FALL ASLEEP WHILE SITTING QUIETLY AFTER LUNCH WITHOUT ALCOHOL: WOULD NEVER DOZE
HOW LIKELY ARE YOU TO NOD OFF OR FALL ASLEEP IN A CAR, WHILE STOPPED FOR A FEW MINUTES IN TRAFFIC: WOULD NEVER DOZE
SITING INACTIVE IN A PUBLIC PLACE LIKE A CLASS ROOM OR A MOVIE THEATER: WOULD NEVER DOZE
HOW LIKELY ARE YOU TO NOD OFF OR FALL ASLEEP WHILE SITTING AND READING: SLIGHT CHANCE OF DOZING
HOW LIKELY ARE YOU TO NOD OFF OR FALL ASLEEP WHEN YOU ARE A PASSENGER IN A CAR FOR AN HOUR WITHOUT A BREAK: SLIGHT CHANCE OF DOZING
HOW LIKELY ARE YOU TO NOD OFF OR FALL ASLEEP WHILE WATCHING TV: SLIGHT CHANCE OF DOZING
HOW LIKELY ARE YOU TO NOD OFF OR FALL ASLEEP WHILE LYING DOWN TO REST IN THE AFTERNOON WHEN CIRCUMSTANCES PERMIT: WOULD NEVER DOZE

## 2024-07-19 NOTE — TELEPHONE ENCOUNTER
Steve Moreno per our conversation once you receive your CPAP, please forward a 30 Days compliance report demonstrating wearing and have Sleep Medicine Doctor provide a final clearance note at your follow-up appointment.  Again Judy Barrett [phone] will be your temporary RN Coordinator while Jessica is out of the office.  Thanks again, Amrita Talamantes LPN

## 2024-07-19 NOTE — PATIENT INSTRUCTIONS
[facility] Sleep Medicine  [facility] ONE  [facility] ONE  [address] [place] [address]       Thank you for coming to the Sleep Medicine Clinic today! Your sleep medicine provider today was: REBECCA Garcia Below is a summary of your treatment plan, patient education, other important information, and our contact numbers.    Dear Tiffanie Burdick,    Thank you for visiting  Sleep Medicine Clinic !     1. According to your symptom and sleep study report. I will order the new PAP device for you to control your sleep apnea, feel free to contact your DME.   If [employer] is your DME, you can reach them at [phone].   2. Please do not drive when you are sleepy and  continue practicing the sleep hygiene  as discussed in clinic today.     3. FOR QUESTIONS AND CONCERNS:   a) : In case of problems with machine or mask interface, please contact your DME company first. DME is the company who provides you the machine and/or PAP supplies / accessories. If [employer] is your DME, you can reach them at [phone].   b):  Please call my office with issues or questions: [phone] ([place]); [phone] ([facility]); [phone] ([facility])    If you have a CPAP or BiPAP machine at home, please bring the unit and all accessories including the power cord to your appointments unless I tell you otherwise. Please have knowledge of the DME company you worked with to receive your PAP device. If you have copies of any previous sleep testing completed outside of , please bring with you to clinic as well. This information will make our visits more productive.     If you are new to CPAP or BiPAP, please note the minimum usage insurance requires to continuing coverage for the equipment as noted by your DME company. Please discuss equipment issues (PAP unit, mask fit, humidification, etc.) with your DME company first.       In the event that you are  running more than 10 minutes late to your appointment, I will kindly ask you to reschedule. Thanks.      TREATMENT PLAN     Follow-up Appointment:   2 months    PATIENT EDUCATION     OBSTRUCTIVE SLEEP APNEA (HOSSEIN) is a sleep disorder where your upper airway muscles relax during sleep and the airway intermittently and repetitively narrows and collapses leading to partially blocked airway (hypopnea) or completely blocked airway (apnea) which, in turn, can disrupt breathing in sleep, lower oxygen levels while you sleep and cause night time wakings. Because both apnea and hypopnea may cause higher carbon dioxide or low oxygen levels, untreated HOSSEIN can lead to heart arrhythmia, elevation of blood pressure, and make it harder for the body to consolidate memory and facilitate metabolism (leading to higher blood sugars at night). Frequent partial arousals occur during sleep resulting in sleep deprivation and daytime sleepiness. HOSSEIN is associated with an increased risk of cardiovascular disease, stroke, hypertension, and insulin resistance. Moreover, untreated HOSSEIN with excessive daytime sleepiness can increase the risk of motor vehicular accidents.    Below are conservative strategies for HOSSEIN regardless of HOSSEIN severity are:   Positional therapy - Avoid sleeping on your back.   Healthy diet and regular exercise to optimize weight is highly encouraged.   Avoid alcohol late in the evening and sedative-hypnotics as these substances can make sleep apnea worse.   Improve breathing through the nose with intranasal steroid spray, saline rinse, or antihistamines    Safety: Avoid driving vehicle and operating heavy equipment while sleepy. Drowsy driving may lead to life-threatening motor vehicle accidents. A person driving while sleepy is 5 times more likely to have an accident. If you feel sleepy, pull over and take a short power nap (sleep for less than 30 minutes). Otherwise, ask somebody to drive you.    Treatment options for sleep  apnea include weight management, positional therapy, Positive Airway Therapy (PAP) therapy, oral appliance therapy, hypoglossal nerve stimulator (Inspire) and select airway surgeries.    Starting Positive Airway Pressure (PAP): You were ordered a device to wear when you sleep called PAP (Positive Airway Pressure) to treat your sleep apnea. The order will be submitted to a durable medical equipment (DME) company who will arrange setting you up with the device. They will provide all the necessary equipment and discuss use and maintenance of the device with you as well as mask fitting and process of replacing / renewing PAP supplies or accessories. Once you get the machine, please start using it immediately. You may not be successful right away and that is okay. [place] be certain that you keep trying nightly and reach out to DME if you are struggling or having issues with machine usage.     *Please follow-up with me in 1-2 months of starting CPAP to see how well it is working for you and to do some troubleshooting if needed. Also, please bring all PAP equipment with you to follow up appointments unless told otherwise.     Important things to keep in mind as you start PAP:  Insurance will monitor your usage during the first 90 days. You should use your PAP - all night, every night, and including all naps (especially if naps are more than 30 minutes) for your health. The bare minimum is to use your PAP device while sleeping for at least 4 hours per day at least 5-6 days per week.. Otherwise, your PAP device will be reclaimed by your DME company at 90 days.  There are many masks to choose from to wear with your PAP machine. If you are not comfortable with the first mask issued to you, call your DME company and ask for another option to try. You typically have a 30-day mask guarantee from the day you received your machine.   Discuss with your provider if you are having issues breathing with the machine or if the temperature  or humidity feel uncomfortable.  Expect to have an adjustment period when you start your device. It helps to continuing wearing the machine every day for a period of time until you get more used to it. You can practice with wearing the mask alone if you need, then add in the PAP air pressure a few days later.   Reach out for help if you are struggling! The sleep medicine department can be reached at [phone]  We encourage you to download data monitoring apps to your phone. For [employer] 10/11 - MyAir rubén. For [employer] - DreamMapper. Both apps are available in the [employer] store for free and are a great tool to monitor your progress with your PAP device night to night.    Tips for success with PAP machine usage:  Comfortable and well-fitting mask  Appropriate pressure on the machine  Using humidification  Support from bed partner and clinical team      Maintaining your CPAP/BPAP device:    The humidification chamber (aka water tank or water chamber) needs to be filled with distilled water to prevent buildup of white deposits in the future. If you cannot find distilled water, you can use tap water but expect to have white deposits buildup seen after prolonged use with tap water. If you start seeing white deposits on the water chamber, you can clean it by filling it with equal parts of distilled white vinegar and water. Let the vinegar-water mixture sit for 2 hours, and then rinse it with running tap water. Clean with soap and water then let it dry.     You should try to keep your machine clean in order to work well. Here are some tips to clean PAP supplies / accessories:    Clean the humidification chamber (aka water tank) as well as your mask and tubing at least once a week with soap and water.   Alternatively, you can fill a sink or basin with warm water and add a little mild detergent, like Ivory dish soap. Gently wipe your supplies with the soapy water to free all the oils and dirt that may  have collected. Once that's done, rinse these items with clean water until the soap is gone and let them air dry. You can hang your tubing over the curtain kika in your bathroom so that it dries.  The mask insert (part of the mask that has contact with your skin) needs to be cleaned with soap and water daily. Another option is to wipe them down with CPAP wipes or baby wipes.    You should replace your mask and tubing frequently in order to prevent bacteria buildup, machine damage, and mask seal issues. The older the mask and hose, the high likelihood that there is bacteria buildup in it especially if they are not cleaned regularly. Dirty filters damage machines because build-up of dust and contaminants can cause machine to over-heat, and in time, damage the motor of machine. Cushions lose their seal over time as most masks are made of plastic and silicone while headgear is made of neoprene. These materials will break down with age and frequent use. Here is the recommended replacement schedule for PAP supplies / accessories:    Twice a month- disposable filters and cushions for nasal mask or nasal pillows.  Once a month- cushion for full face mask  Every 3 months- mask with headgear and PAP tubing (standard or heated hose)  Every 6 months- reusable filter, water chamber, and chin strap     Other useful information:    Some people do not put water in the tank while other people prefers to put water in the tank to prevent mouth dryness. Try to experiment to determine which is more comfortable for you.   In general, new machines have 2 years warranty on parts while health insurance allows you to have a new machine once every 5 years.     Common issues with PAP machine:    Mask gets dislodged when turning to the side: Consider getting a CPAP pillow or switching to a mask with hose on top.     Dry mouth:  Your machine has built-in humidifier that heats up the air to prevent dry mouth. It can be adjusted to your comfort.  "You can try that first and increase setting one level one night at a time to check which setting is comfortable and effective in lessening dry mouth. In some patients with heated hose, adjusting tube temperature to make air warmer can improve dry mouth. If dry mouth persists despite adjusting humidity or tube temperature setting, may apply OTC Biotene gel over the gums at bedtime.  If Biotene gel is not effective, consider trying XEROSTOM gel from [employer].  Also, eliminate or reduce dose of medications that can cause dry mouth if possible. Lastly, may try getting a separate room humidifier machine.    Airleaks: Please call DME as they may need to adjust your mask or refit you with a different kind or different size of mask. In addition, you can ask DME for tips on getting a good mask seal and mask fit.     Difficulty tolerating the mask: Contact your DME to try a different kind of mask and/or call office to get a referral to Sleep Psychologist for CPAP desensitization. CPAP desensitization technique is a set of strategies that helps patient cope with claustrophobia and anxiety related to wearing mask. Alternatively, we can do a daytime mini-sleep study called PAP-nap trial wherein you will try on different kinds of mask and the sleep technician will try different pressure settings on CPAP and BPAP machines to see which specific pressure is tolerable and comfortable for you.     Water droplets or moisture within the hose and/or mask: This is called rain-out and it is caused by condensation of too much heated humidity on the cooler walls of the hose. If you have rain-out, turn down humidity settings or get a heated hose. If you already have a heated hose, turn up the \"tube temperature\" of the heated hose. Alternatively, if you don't want to get a heated hose or warmer air, may wrap the CPAP hose with stockings to keep it somewhat warm. Also, you need to place the machine on the floor and lower the hose so that " water won't travel upward towards your mask.     PAP desensitization techniques: If you have concerns about something being on your face at night, you can start by getting used to it before trying to sleep with it as follows:      Sit in a comfortable chair or bed. Connect the mask and hose to the CPAP/BPAP machine. Hold the mask on your face (without straps on) and turn on the machine. Practice breathing with the mask on while awake sitting and watching television, reading, or performing a sedentary activity during the day for 5-10 minutes and then take it off.  If tolerated, try again and gradually build up to longer periods of time. If not tolerated, try and try again until it is more comfortable as you become more desensitized. If you are able to use it for at least 20-30 minutes, move unto the next step.     Sit in a comfortable chair or bed. Connect the mask and hose to the CPAP/BPAP machine. Strap the mask on your head and turn on the machine. Practice breathing with the mask and headgear on while awake sitting and watching television, reading, or performing a sedentary activity. Start with 5-10 minutes and gradually increasing time until you can wear it comfortably for at least 20-30 minutes, then move to the next step.    Take a shorter daytime nap with machine turned on while you are in a reclined position in bed, sofa, or recliner. Start with 5-10 minute nap and gradually increase up to 30 minutes. It is not important whether you fall asleep or not. The goal is to rest comfortably with PAP machine on.     Reintroduce PAP machine into nighttime sleep. You can begin using it a portion of the night and gradually increase up to entire night.     Proceed from one step to the next only when you are completely comfortable. If you feel any anxiety or discomfort, return to the previous step, then proceed again when comfortable.    Expect to “work” with your CPAP/BIPAP unit. It is important to try to relax when  beginning CPAP/BIPAP therapy. Inhalation and exhalation should occur through the nose only. If you are unable to consistently breathe this way, do not panic or lose hope. There are other types of masks which allow you to breathe through your nose and/or your mouth. Also, in some patients, using intranasal steroid spray (e.g. Flonase or Nasocort or Fluticasone) 1 hour before bedtime and/or before putting on CPAP mask can help tolerate breathing through the mask.    Don't give up after a few attempts--some patients adjust quickly, while some patients need 3-4 weeks (or sometimes even longer) to be accustomed to CPAP therapy.  Contact your sleep medicine specialist if you have a significant change in weight since this may affect your pressure.    You can also go to the following EDUCATION WEBSITES for further information:   American Academy of Sleep Medicine http://sleepeducation.org  National Sleep Foundation: https://sleepfoundation.org  American Sleep Apnea Association: https://www.sleepapnea.org (for patients with sleep apnea)  Narcolepsy Network: https://www.narcolepsynetwork.org (for patients with narcolepsy)  [employer]: https://www.C[employer] Creationcolepsy.org (for patients with narcolepsy)  Hypersomnia Foundation: https://www.hypersomniafoundation.org (for patients with idiopathic hypersomnia)  RLS foundation: https://www.rls.org (for patients with restless leg syndrome)    IMPORTANT INFORMATION     Call 911 for medical emergencies.  Our offices are generally open from Monday-Friday, 8 am - 5 pm.   There are no supporting services by either the sleep doctors or their staff on weekends and Holidays, or after 5 PM on weekdays.   If you need to get in touch with me, you may either call my office number or you can use [employer].  If a referral for a test, for CPAP, or for another specialist was made, and you have not heard about scheduling this within a week, please call scheduling at [phone] ([phone]).  If you  are unable to make your appointment for clinic or an overnight study, kindly call the office or sleep testing center at least 48 hours in advance to cancel and reschedule.  If you are on CPAP, please bring your device's card and/or the device to each clinic appointment.   In case of problems with PAP machine or mask interface, please contact your DME (Durable Medical Equipment) company first. DME is the company who provides you the machine and/or PAP supplies.       PRESCRIPTIONS     We require 7 days advanced notice for prescription refills. If we do not receive the request in this time, we cannot guarantee that your medication will be refilled in time.    IMPORTANT PHONE NUMBERS     Sleep Medicine Clinic Fax: [phone]  Appointments (for Pediatric Sleep Clinic): [phone] - option 1  Appointments (for Adult Sleep Clinic): [phone] - option 2  Appointments (For Sleep Studies): [phone] - option 3  Behavioral Sleep Medicine: [phone]  Sleep Surgery: [phone]  Nutrition Service: [phone]  Weight management clinics with endocrinology: [phone]  Bariatric Services: [phone] (includes weight loss medications and weight loss surgery)  [facility]: [phone] (offers holistic approaches to weight management)  ENT (Otolaryngology): [phone]  Headache Clinic (Neurology): [phone]  Neurology: [phone]  Psychiatry: [phone]  Pulmonary Function Testing (PFT) Center: [phone]  Pulmonary Medicine: [phone]  [employer] (DME): [phone]      OUR SLEEP TESTING LOCATIONS     Our team will contact you to schedule your sleep study, however, you can contact us as follow:  Main Phone Line (scheduling only): [phone]), option 3  Adult and Pediatric Locations  [facility] (6 years and older): [facility] - 4th floor ([address] [place] [address]) After hours line: [phone]  [facility]  "[facility] (Main campus: All ages): [facility], 6th floor. After hours line: [phone]   [place] (5 years and older; younger considered on case-by-case basis): [address]; [address], [address]. Scheduling  After hours line: [phone]   [place] (6 years and older): [address]; [address]; [address]   [place] (6 years and older): [address] [facility], [address]  After hours line: [phone]   [place] (13 years and older) in [place]: [address], 2nd floor  After hours line: [phone]   [place] (13 year and older): [address]  After hours line: [phone]     Adult Only Locations:   [place] (18 years and older): [address] [facility], 2nd floor   [place] (18 years and older): [address] [facility]; 4th floor  After hours line: [phone]   [place] (18 years and older) at [place]: [id number] [place] [address]  After hours line: [phone]     CONTACTING YOUR SLEEP MEDICINE PROVIDER AND SLEEP TEAM      For issues with your machine or mask interface, please call your DME provider first. DME stands for durable medical company. DME is the company who provides you the machine and/or PAP supplies / accessories.   To schedule, cancel, or reschedule SLEEP STUDY APPOINTMENTS, please call the Main Phone Line at [phone] - option 3.   To schedule, cancel, or reschedule CLINIC APPOINTMENTS, you can do it in \"[place]\", call [phone] to speak with my  (Judy Giles), or call the Main Phone Line at [phone] - option 2  For CLINICAL QUESTIONS or MEDICATION REFILLS, please call direct line for Adult Sleep Nurses at [phone].   Lastly, you can also send a message directly to your provider through \"My Chart\", which is the email service through your  Records Account: [email or web address]       Here at [facility], we wish you a restful sleep!   "

## 2024-08-05 ENCOUNTER — DOCUMENTATION (OUTPATIENT)
Dept: SURGERY | Facility: CLINIC | Age: 39
End: 2024-08-05
Payer: COMMERCIAL

## 2024-09-05 NOTE — PROGRESS NOTES
[facility]  [facility] ONE  [facility] ONE  [address] [place] [address]     [facility]  Follow Up  Visit Note             Subjective  Patient ID: Tiffanie Burdick is a 39 y.o. female with past medical history significant for Morbid Obesity, Depression. Anxiety, Headache, and Sleep disorder breathing.      9/6/24: UPDATED : The patient returned to the clinic to review her initial pap compliance before Bariatric Surgery. Her over 4 hours pap compliance rate was 93  %, and Her ESS  is 4  today. She reports having a mask leaking when she sleeps on her side. I provided a small [employer]ear nasal pillow small size to try, and she reported being comfortable with it. The pre-Bariatric Surgery instruction has been discussed and provided; the patient verbalized understanding of it.     7/19/24: The patient had a virtual visit with me to review her sleep study to treat her sleep apnea before Bariatric Surgery. The desensitization strategy, 30-day free mask return policy, and insurance requirements are all discussed with the patient. The patient verbalized understanding it. Her ESS is 3 today.     4/19/2024: The patient is here alone today and was referred by bariatrics Zachary Kwok MD, for a comprehensive sleep medicine evaluation due to snoring and daytime sleepiness and a preoperative risk evaluation before bariatric surgery. She reports her general sleepiness and thinks that was from her six kids make her tired. She also has a family history of HOSSEIN. Today, she came here to establish care before bariatric surgery. ESS is 13, TRUPTI is 15, and the neck circumference is 16 inches today.     HPI  The patient had had these symptoms for at least one year.   The patient has never had a sleep study done yet.      SLEEP STUDY HISTORY: (personally reviewed raw data such as interpretation report, data sheet, hypnogram, and titration table if  available and applicable)  5/22/24: Home Sleep Study: AHI 3%: 13.5/hr, Supine AHI 3%: 14.6%, AHI 4%:8.1/hr, Supine AHI 4%: 8.8/hr, Ajith: 82.6%, <88%: 3.7 minutes     SLEEP-WAKE SCHEDULE (after cpap)  Bedtime: 10 PM on weekdays, 10:30 PM  on weekends  Subjective sleep latency: 10 minutes  Problems falling asleep: No  Number of awakenings: 2 times per night spontaneously for unknown reasons  Falls back asleep in 5 minutes  Problems staying asleep: No  Final wake time: 6:30 AM on weekdays, 7:30 AM on weekends  Out of bed time: 6:35 AM on weekdays, 7:30 AM on weekends  Shift work: No  Naps: No  Average sleep duration (excluding naps): 8 hours     SLEEP ENVIRONMENT  Sleep location: bed  Sleep status: sleeps with   Room is dark:  Yes  Room is quiet: Yes  Room is cool: Yes  Bed comfort: good     SLEEP HABITS:   Activities before bedtime: read a book  Activities in bed: read a book  Preferred sleep position: back and side     SLEEP ROS: (after CPAP)  Night symptoms: Denies for snoring  Morning symptoms: Denies for unrefreshing sleep, morning headache  Daytime symptoms Denies for excessive daytime sleepiness and fatigue  Hypersomnia / narcolepsy symptoms: Patient denies symptoms of a hypersomnolence disorder such as sleep paralysis, sleep-related hallucinations, recurrent sleep attacks, automatic behaviors, and cataplexy.   RLS symptoms: Patient denies RLS symptoms.  Movements in sleep: Patient denies problematic movements in sleep.  Parasomnia symptoms: Patient denies symptoms of parasomnia.     WEIGHT: gained 20 lbs in 6 months      REVIEW OF SYSTEMS: All other systems have been reviewed and are negative.     PERTINENT SOCIAL HISTORY:  Occupation: [employer]  Smoking: No   ETOH: Yes, socialally  Marijuana: No   Caffeine: Yes  Sleep aids: No   Claustrophobia: No      PERTINENT PAST SURGICAL HISTORY:  non-contributory     PERTINENT FAMILY HISTORY:  sleep apnea- father, sister     Active Problems, Allergy List,  Medication List, and PMH/PSH/FH/Social Hx have been reviewed and reconciled in chart. No significant changes unless documented in the pertinent chart section. Updates made when necessary.          Objective     Vitals:    09/06/24 1047   BP: 132/85   Pulse: 78   Resp: 16   SpO2: 97%      Physical Exam  Constitutional:alert and oriented to time, place, and person  Lungs: Clear to auscultation bilateral, no rales  Heart: Regular rate and rhythm, no murmurs    PAP Adherence:  DURABLE MEDICAL EQUIPMENT COMPANY: [employer]  Machine: THERAPY: RESMED AIRSENSE 11 PRESSURE SETTINGS: 5 - 15 cm H2O  Mask: N30I small   Issues with therapy: ISSUES WITH THERAPY: Mask leak when side position  Benefits with PAP: PERCEIVED BENEFITS OF PAP: refreshing sleep reduced daytime sleepiness decreased or no snoring better sleep quality    A PAP adherence download was obtained and data was reviewed personally today in clinic. (see scanned document in EPIC)     Assessment/Plan  Tiffanie Burdick is a 39 y.o. female who is seen to evaluate for possible obstructive sleep apnea. The pathophysiology of sleep apnea, diagnostic testing (HST vs PSG), treatment options (PAP, oral appliance, surgery, hypoglossal nerve stimulator called Inspire), and supportive management (weight loss, positional therapy, smoking cessation, avoidance of alcohol and sedatives) were discussed with the patient in detail. Risk factors of sleep apnea as well as cardiometabolic and neurocognitive sequelae associated with untreated sleep apnea were also discussed. Lastly, patient was advised to avoid driving vehicle or operating heavy machinery when sleepy.      Tiffanie Burdick with the following problems:    # Bariatric Surgery Instruction:  -I advise judicious use of pain medications post operatively as pain medications can make sleep apnea worse.  -I advise close monitoring of the patient post operatively due to increased risk of complications related to sleep  apnea.   -Tiffanie Williamson be at higher risk of postoperative respiratory complications.  -Tiffanie Burdickunderstands the risk of post operative complications are higher for her  Tiffanie Burdick is at increased but not prohibitive risk due to HOSSEIN.   -Tiffanie Patterson optimized on PAP therapy for sleep apnea as long as she is compliant with PAP use per most recent download.  -Tiffanie Burdick verbalizes understanding of the above instructions and risks.      # OBSTRUCTIVE SLEEP APNEA:   -Great compliance, continue 5-15  cmH20 auto PAP and order annual supplies through [employer].   -Sleep apnea and PAP therapy education were provided at length in the clinic today. Tiffanie Burdick verbalized understanding.  -Emphasized diet, exercise, and weight loss in the clinic, as were non-supine sleep, avoiding alcohol in the late evening, and driving or operating heavy machinery when sleepy.  Tiffanie Burdickverbalizes understanding of the above instructions and risks.      # CHRONIC SLEEP MAINTENANCE INSOMNIA:  -Improved after using cpap     # DEPRESSION :   -Tiffanie Patterson not taking any medication and doing well.  -Denies HI/SI      # HYPERTENSION:  -Blood pressure was 132/85 today.  -Denies headache, palpitation, and syncope in the clinic.  -Follows with PCP/ Cardiology     # MORBID OBESITY:  -with a BMI of  41.5, the last Bicarbonate was 27            Bicarbonate   Date Value Ref Range Status   02/23/2024 27 21 - 32 mmol/L Final   -Encourage to have regular exercise to manage weight well.  -Bariatric surgery candidate.        RTC 6 months after Bariatric Surgery         All of patient's questions were answered. She verbalizes understanding and agreement with my assessment and plan.

## 2024-09-06 ENCOUNTER — LAB (OUTPATIENT)
Dept: LAB | Facility: LAB | Age: 39
End: 2024-09-06
Payer: COMMERCIAL

## 2024-09-06 ENCOUNTER — OFFICE VISIT (OUTPATIENT)
Dept: SLEEP MEDICINE | Facility: CLINIC | Age: 39
End: 2024-09-06
Payer: COMMERCIAL

## 2024-09-06 VITALS
RESPIRATION RATE: 16 BRPM | WEIGHT: 265 LBS | BODY MASS INDEX: 41.59 KG/M2 | HEART RATE: 78 BPM | DIASTOLIC BLOOD PRESSURE: 85 MMHG | OXYGEN SATURATION: 97 % | HEIGHT: 67 IN | SYSTOLIC BLOOD PRESSURE: 132 MMHG

## 2024-09-06 DIAGNOSIS — G47.33 OSA (OBSTRUCTIVE SLEEP APNEA): Primary | ICD-10-CM

## 2024-09-06 DIAGNOSIS — G47.33 OBSTRUCTIVE SLEEP APNEA (ADULT) (PEDIATRIC): ICD-10-CM

## 2024-09-06 DIAGNOSIS — E66.01 MORBID OBESITY (MULTI): ICD-10-CM

## 2024-09-06 DIAGNOSIS — E06.3 HASHIMOTO'S THYROIDITIS: Primary | ICD-10-CM

## 2024-09-06 DIAGNOSIS — I10 PRIMARY HYPERTENSION: ICD-10-CM

## 2024-09-06 DIAGNOSIS — Z98.84 BARIATRIC SURGERY STATUS: ICD-10-CM

## 2024-09-06 DIAGNOSIS — F41.8 DEPRESSION WITH ANXIETY: ICD-10-CM

## 2024-09-06 DIAGNOSIS — E03.9 HYPOTHYROIDISM, UNSPECIFIED TYPE: ICD-10-CM

## 2024-09-06 DIAGNOSIS — F41.9 ANXIETY: ICD-10-CM

## 2024-09-06 PROBLEM — G47.30 SLEEP DISORDER BREATHING: Status: RESOLVED | Noted: 2024-04-19 | Resolved: 2024-09-06

## 2024-09-06 LAB — TSH SERPL-ACNC: 10.14 MIU/L (ref 0.44–3.98)

## 2024-09-06 PROCEDURE — 1036F TOBACCO NON-USER: CPT

## 2024-09-06 PROCEDURE — 3008F BODY MASS INDEX DOCD: CPT

## 2024-09-06 PROCEDURE — 3079F DIAST BP 80-89 MM HG: CPT

## 2024-09-06 PROCEDURE — 36415 COLL VENOUS BLD VENIPUNCTURE: CPT

## 2024-09-06 PROCEDURE — 99213 OFFICE O/P EST LOW 20 MIN: CPT

## 2024-09-06 PROCEDURE — 84443 ASSAY THYROID STIM HORMONE: CPT

## 2024-09-06 PROCEDURE — 3075F SYST BP GE 130 - 139MM HG: CPT

## 2024-09-06 ASSESSMENT — SLEEP AND FATIGUE QUESTIONNAIRES
HOW LIKELY ARE YOU TO NOD OFF OR FALL ASLEEP IN A CAR, WHILE STOPPED FOR A FEW MINUTES IN TRAFFIC: WOULD NEVER DOZE
HOW LIKELY ARE YOU TO NOD OFF OR FALL ASLEEP WHEN YOU ARE A PASSENGER IN A CAR FOR AN HOUR WITHOUT A BREAK: SLIGHT CHANCE OF DOZING
HOW LIKELY ARE YOU TO NOD OFF OR FALL ASLEEP WHILE SITTING AND READING: SLIGHT CHANCE OF DOZING
HOW LIKELY ARE YOU TO NOD OFF OR FALL ASLEEP WHILE SITTING AND TALKING TO SOMEONE: WOULD NEVER DOZE
SITING INACTIVE IN A PUBLIC PLACE LIKE A CLASS ROOM OR A MOVIE THEATER: WOULD NEVER DOZE
HOW LIKELY ARE YOU TO NOD OFF OR FALL ASLEEP WHILE LYING DOWN TO REST IN THE AFTERNOON WHEN CIRCUMSTANCES PERMIT: SLIGHT CHANCE OF DOZING
ESS-CHAD TOTAL SCORE: 4
HOW LIKELY ARE YOU TO NOD OFF OR FALL ASLEEP WHILE WATCHING TV: SLIGHT CHANCE OF DOZING
HOW LIKELY ARE YOU TO NOD OFF OR FALL ASLEEP WHILE SITTING QUIETLY AFTER LUNCH WITHOUT ALCOHOL: WOULD NEVER DOZE

## 2024-09-06 ASSESSMENT — PATIENT HEALTH QUESTIONNAIRE - PHQ9
2. FEELING DOWN, DEPRESSED OR HOPELESS: NOT AT ALL
SUM OF ALL RESPONSES TO PHQ9 QUESTIONS 1 AND 2: 0
1. LITTLE INTEREST OR PLEASURE IN DOING THINGS: NOT AT ALL

## 2024-09-06 ASSESSMENT — PAIN SCALES - GENERAL: PAINLEVEL: 0-NO PAIN

## 2024-09-06 NOTE — LETTER
September 6, 2024     Zachary Kwok MD  [address] [facility] [place]  [facility]  [facility] [id number]    Patient: Tiffanie Burdick   YOB: 1985   Date of Visit: 9/6/2024       Dear Dr. Zachary Kwok MD:    Thank you for referring Tiffanie Burdick to me for evaluation. Below are my notes for this consultation.  If you have questions, please do not hesitate to call me. I look forward to following your patient along with you.       Sincerely,     Daron Armas, LOS-CNP      CC: No Recipients  ______________________________________________________________________________________           [facility]  [facility] ONE  [facility] ONE  [address] [facility] [address]     [facility]  Follow Up  Visit Note             Subjective  Patient ID: Tiffanie Burdick is a 39 y.o. female with past medical history significant for Morbid Obesity, Depression. Anxiety, Headache, and Sleep disorder breathing.      9/6/24: UPDATED : The patient returned to the clinic to review her initial pap compliance before Bariatric Surgery.  Her over 4 hours pap compliance rate was 93  %, and Her ESS  is 4  today.   7/19/24: The patient had a virtual visit with me to review her sleep study to treat her sleep apnea before Bariatric Surgery. The desensitization strategy, 30-day free mask return policy, and insurance requirements are all discussed with the patient. The patient verbalized understanding it. Her ESS is 3 today.     4/19/2024: The patient is here alone today and was referred by bariatrics Zachary Kwok MD, for a comprehensive sleep medicine evaluation due to snoring and daytime sleepiness and a preoperative risk evaluation before bariatric surgery. She reports her general sleepiness and thinks that was from her six kids make her tired. She also has a family history of HOSSEIN. Today, she came here to establish care before bariatric surgery. ESS is 13, TRUPTI  is 15, and the neck circumference is 16 inches today.     HPI  The patient had had these symptoms for at least one year.   The patient has never had a sleep study done yet.      SLEEP STUDY HISTORY: (personally reviewed raw data such as interpretation report, data sheet, hypnogram, and titration table if available and applicable)  5/22/24: Home Sleep Study: AHI 3%: 13.5/hr, Supine AHI 3%: 14.6%, AHI 4%:8.1/hr, Supine AHI 4%: 8.8/hr, Ajith: 82.6%, <88%: 3.7 minutes     SLEEP-WAKE SCHEDULE (after cpap)  Bedtime: 10 PM on weekdays, 10:30 PM  on weekends  Subjective sleep latency: 10 minutes  Problems falling asleep: No  Number of awakenings: 2 times per night spontaneously for unknown reasons  Falls back asleep in 5 minutes  Problems staying asleep: No  Final wake time: 6:30 AM on weekdays, 7:30 AM on weekends  Out of bed time: 6:35 AM on weekdays, 7:30 AM on weekends  Shift work: No  Naps: No  Average sleep duration (excluding naps): 8 hours     SLEEP ENVIRONMENT  Sleep location: bed  Sleep status: sleeps with   Room is dark:  Yes  Room is quiet: Yes  Room is cool: Yes  Bed comfort: good     SLEEP HABITS:   Activities before bedtime: read a book  Activities in bed: read a book  Preferred sleep position: back and side     SLEEP ROS: (after CPAP)  Night symptoms: Denies for snoring  Morning symptoms: Denies for unrefreshing sleep, morning headache  Daytime symptoms Denies for excessive daytime sleepiness and fatigue  Hypersomnia / narcolepsy symptoms: Patient denies symptoms of a hypersomnolence disorder such as sleep paralysis, sleep-related hallucinations, recurrent sleep attacks, automatic behaviors, and cataplexy.   RLS symptoms: Patient denies RLS symptoms.  Movements in sleep: Patient denies problematic movements in sleep.  Parasomnia symptoms: Patient denies symptoms of parasomnia.     WEIGHT: gained 20 lbs in 6 months      REVIEW OF SYSTEMS: All other systems have been reviewed and are negative.      PERTINENT SOCIAL HISTORY:  Occupation: [employer]  Smoking: No   ETOH: Yes, socialally  Marijuana: No   Caffeine: Yes  Sleep aids: No   Claustrophobia: No      PERTINENT PAST SURGICAL HISTORY:  non-contributory     PERTINENT FAMILY HISTORY:  sleep apnea- father, sister     Active Problems, Allergy List, Medication List, and PMH/PSH/FH/Social Hx have been reviewed and reconciled in chart. No significant changes unless documented in the pertinent chart section. Updates made when necessary.          Objective     Vitals:    09/06/24 1047   BP: 132/85   Pulse: 78   Resp: 16   SpO2: 97%      Physical Exam  Constitutional:alert and oriented to time, place, and person  Lungs: Clear to auscultation bilateral, no rales  Heart: Regular rate and rhythm, no murmurs    PAP Adherence:  DURABLE MEDICAL EQUIPMENT COMPANY: [employer]  Machine: THERAPY: RESMED AIRSENSE 11 PRESSURE SETTINGS: 5 - 15 cm H2O  Mask: N30I small   Issues with therapy: ISSUES WITH THERAPY: Mask leak when side position  Benefits with PAP: PERCEIVED BENEFITS OF PAP: refreshing sleep reduced daytime sleepiness decreased or no snoring better sleep quality    A PAP adherence download was obtained and data was reviewed personally today in clinic. (see scanned document in EPIC)     Assessment/Plan  Tiffanie Burdick is a 39 y.o. female who is seen to evaluate for possible obstructive sleep apnea. The pathophysiology of sleep apnea, diagnostic testing (HST vs PSG), treatment options (PAP, oral appliance, surgery, hypoglossal nerve stimulator called Inspire), and supportive management (weight loss, positional therapy, smoking cessation, avoidance of alcohol and sedatives) were discussed with the patient in detail. Risk factors of sleep apnea as well as cardiometabolic and neurocognitive sequelae associated with untreated sleep apnea were also discussed. Lastly, patient was advised to avoid driving vehicle or operating heavy machinery when sleepy.       Tiffanie Burdick with the following problems:    # Bariatric Surgery Instruction:  -I advise judicious use of pain medications post operatively as pain medications can make sleep apnea worse.  -I advise close monitoring of the patient post operatively due to increased risk of complications related to sleep apnea.   -Tiffanie Williamson be at higher risk of postoperative respiratory complications.  -Tiffanie Burdickunderstands the risk of post operative complications are higher for her  Tiffanie Burdick is at increased but not prohibitive risk due to HOSSEIN.   -Tiffanie Patterson optimized on PAP therapy for sleep apnea as long as she is compliant with PAP use per most recent download.  -Tiffanie Burdick verbalizes understanding of the above instructions and risks.      # OBSTRUCTIVE SLEEP APNEA:   -Great compliance, continue 5-15  cmH20 auto PAP and order annual supplies through [employer].   -Sleep apnea and PAP therapy education were provided at length in the clinic today. Tiffanie Burdick verbalized understanding.  -Emphasized diet, exercise, and weight loss in the clinic, as were non-supine sleep, avoiding alcohol in the late evening, and driving or operating heavy machinery when sleepy.  Tiffanie Burdickverbalizes understanding of the above instructions and risks.      # CHRONIC SLEEP MAINTENANCE INSOMNIA:  -Improved after using cpap     # DEPRESSION :   -Tiffanie Patterson not taking any medication and doing well.  -Denies HI/SI      # HYPERTENSION:  -Blood pressure was 132/85 today.  -Denies headache, palpitation, and syncope in the clinic.  -Follows with PCP/ Cardiology     # MORBID OBESITY:  -with a BMI of  41.5, the last Bicarbonate was 27            Bicarbonate   Date Value Ref Range Status   02/23/2024 27 21 - 32 mmol/L Final   -Encourage to have regular exercise to manage weight well.  -Bariatric surgery candidate.        RTC 6 months after Bariatric Surgery         All of patient's questions were  answered. She verbalizes understanding and agreement with my assessment and plan.

## 2024-09-06 NOTE — PATIENT INSTRUCTIONS
[facility] Sleep Medicine  [facility] ONE  [facility] ONE  [address] [place] [address]       Thank you for coming to the Sleep Medicine Clinic today! Your sleep medicine provider today was: REBECCA Garcia Below is a summary of your treatment plan, patient education, other important information, and our contact numbers.    Dear Ms Tiffanie Burdick       Your Sleep Provider Today: REBECCA Garcia  Your Primary Care Physician: Yaw Gonzalez MD   Your Referring Provider: Daron Armas APRN-CNP    Diagnosis:  OBSTRUCTIVE SLEEP APNEA     Thank you for visiting  Sleep Medicine Clinic !     1.You are doing great, we ordered the annual supplies for you. Feel free to contact your DME company to get new supplies.    2. BARIATRIC SURGERY INSTRUCTION  -Please continuous using your PAP to treat your sleep apnea.  -Bring your PAP and all equipment with you to surgery.  -Use your PAP with surgery and during recovery.  -Keep your head of the bed at 30* or higher after surgery.    4. Please do not drive when you are sleepy and continue practicing the sleep hygiene as discussed in clinic.    5. FOR QUESTIONS AND CONCERNS:   a) : In case of problems with machine or mask interface, please contact your DME company first. DME is the company who provides you the machine and/or PAP supplies / accessories. If [employer] is your DME, you can reach them at [phone].   b):  To schedule, cancel, or reschedule SLEEP STUDY appointments, please call [phone]  c): Please call my office with issues or questions: [phone] ([place]); [phone] ([facility]); [phone] ([facility])    If you have a CPAP or BiPAP machine at home, please bring the unit and all accessories including the power cord to your appointments unless I tell you otherwise. Please have knowledge of the DME company you worked with to receive your PAP device. If you have copies of any previous  sleep testing completed outside of , please bring with you to clinic as well. This information will make our visits more productive.     If you are new to CPAP or BiPAP, please note the minimum usage insurance requires to continuing coverage for the equipment as noted by your DME company. Please discuss equipment issues (PAP unit, mask fit, humidification, etc.) with your DME company first.       In the event that you are running more than 10 minutes late to your appointment, I will kindly ask you to reschedule. Thanks.      TREATMENT PLAN     Follow-up Appointment:   Follow-up in 6 months. After Bariatric Surgery     PATIENT EDUCATION     OBSTRUCTIVE SLEEP APNEA (HOSSEIN) is a sleep disorder where your upper airway muscles relax during sleep and the airway intermittently and repetitively narrows and collapses leading to partially blocked airway (hypopnea) or completely blocked airway (apnea) which, in turn, can disrupt breathing in sleep, lower oxygen levels while you sleep and cause night time wakings. Because both apnea and hypopnea may cause higher carbon dioxide or low oxygen levels, untreated HOSSEIN can lead to heart arrhythmia, elevation of blood pressure, and make it harder for the body to consolidate memory and facilitate metabolism (leading to higher blood sugars at night). Frequent partial arousals occur during sleep resulting in sleep deprivation and daytime sleepiness. HOSSEIN is associated with an increased risk of cardiovascular disease, stroke, hypertension, and insulin resistance. Moreover, untreated HOSSEIN with excessive daytime sleepiness can increase the risk of motor vehicular accidents.    Below are conservative strategies for HOSSEIN regardless of HOSSEIN severity are:   Positional therapy - Avoid sleeping on your back.   Healthy diet and regular exercise to optimize weight is highly encouraged.   Avoid alcohol late in the evening and sedative-hypnotics as these substances can make sleep apnea worse.   Improve  breathing through the nose with intranasal steroid spray, saline rinse, or antihistamines    Safety: Avoid driving vehicle and operating heavy equipment while sleepy. Drowsy driving may lead to life-threatening motor vehicle accidents. A person driving while sleepy is 5 times more likely to have an accident. If you feel sleepy, pull over and take a short power nap (sleep for less than 30 minutes). Otherwise, ask somebody to drive you.    Treatment options for sleep apnea include weight management, positional therapy, Positive Airway Therapy (PAP) therapy, oral appliance therapy, hypoglossal nerve stimulator (Inspire) and select airway surgeries.    Starting Positive Airway Pressure (PAP): You were ordered a device to wear when you sleep called PAP (Positive Airway Pressure) to treat your sleep apnea. The order will be submitted to a durable medical equipment (DME) company who will arrange setting you up with the device. They will provide all the necessary equipment and discuss use and maintenance of the device with you as well as mask fitting and process of replacing / renewing PAP supplies or accessories. Once you get the machine, please start using it immediately. You may not be successful right away and that is okay. Ryan be certain that you keep trying nightly and reach out to DME if you are struggling or having issues with machine usage.     *Please follow-up with me in 1-2 months of starting CPAP to see how well it is working for you and to do some troubleshooting if needed. Also, please bring all PAP equipment with you to follow up appointments unless told otherwise.     Important things to keep in mind as you start PAP:  Insurance will monitor your usage during the first 90 days. You should use your PAP - all night, every night, and including all naps (especially if naps are more than 30 minutes) for your health. The bare minimum is to use your PAP device while sleeping for at least 4 hours per day at least  5-6 days per week.. Otherwise, your PAP device will be reclaimed by your DME company at 90 days.  There are many masks to choose from to wear with your PAP machine. If you are not comfortable with the first mask issued to you, call your DME company and ask for another option to try. You typically have a 30-day mask guarantee from the day you received your machine.   Discuss with your provider if you are having issues breathing with the machine or if the temperature or humidity feel uncomfortable.  Expect to have an adjustment period when you start your device. It helps to continuing wearing the machine every day for a period of time until you get more used to it. You can practice with wearing the mask alone if you need, then add in the PAP air pressure a few days later.   Reach out for help if you are struggling! The sleep medicine department can be reached at [phone]  We encourage you to download data monitoring apps to your phone. For [employer] 10/11 - MyAir rubén. For [employer] - DreamMapper. Both apps are available in the Rubén store for free and are a great tool to monitor your progress with your PAP device night to night.    Tips for success with PAP machine usage:  Comfortable and well-fitting mask  Appropriate pressure on the machine  Using humidification  Support from bed partner and clinical team      Maintaining your CPAP/BPAP device:    The humidification chamber (aka water tank or water chamber) needs to be filled with distilled water to prevent buildup of white deposits in the future. If you cannot find distilled water, you can use tap water but expect to have white deposits buildup seen after prolonged use with tap water. If you start seeing white deposits on the water chamber, you can clean it by filling it with equal parts of distilled white vinegar and water. Let the vinegar-water mixture sit for 2 hours, and then rinse it with running tap water. Clean with soap and water  then let it dry.     You should try to keep your machine clean in order to work well. Here are some tips to clean PAP supplies / accessories:    Clean the humidification chamber (aka water tank) as well as your mask and tubing at least once a week with soap and water.   Alternatively, you can fill a sink or basin with warm water and add a little mild detergent, like Ivory dish soap. Gently wipe your supplies with the soapy water to free all the oils and dirt that may have collected. Once that's done, rinse these items with clean water until the soap is gone and let them air dry. You can hang your tubing over the curtain kika in your bathroom so that it dries.  The mask insert (part of the mask that has contact with your skin) needs to be cleaned with soap and water daily. Another option is to wipe them down with CPAP wipes or baby wipes.    You should replace your mask and tubing frequently in order to prevent bacteria buildup, machine damage, and mask seal issues. The older the mask and hose, the high likelihood that there is bacteria buildup in it especially if they are not cleaned regularly. Dirty filters damage machines because build-up of dust and contaminants can cause machine to over-heat, and in time, damage the motor of machine. Cushions lose their seal over time as most masks are made of plastic and silicone while headgear is made of neoprene. These materials will break down with age and frequent use. Here is the recommended replacement schedule for PAP supplies / accessories:    Twice a month- disposable filters and cushions for nasal mask or nasal pillows.  Once a month- cushion for full face mask  Every 3 months- mask with headgear and PAP tubing (standard or heated hose)  Every 6 months- reusable filter, water chamber, and chin strap     Other useful information:    Some people do not put water in the tank while other people prefers to put water in the tank to prevent mouth dryness. Try to experiment to  determine which is more comfortable for you.   In general, new machines have 2 years warranty on parts while health insurance allows you to have a new machine once every 5 years.     Common issues with PAP machine:    Mask gets dislodged when turning to the side: Consider getting a CPAP pillow or switching to a mask with hose on top.     Dry mouth:  Your machine has built-in humidifier that heats up the air to prevent dry mouth. It can be adjusted to your comfort. You can try that first and increase setting one level one night at a time to check which setting is comfortable and effective in lessening dry mouth. In some patients with heated hose, adjusting tube temperature to make air warmer can improve dry mouth. If dry mouth persists despite adjusting humidity or tube temperature setting, may apply OTC Biotene gel over the gums at bedtime.  If Biotene gel is not effective, consider trying XEROSTOM gel from [employer].  Also, eliminate or reduce dose of medications that can cause dry mouth if possible. Lastly, may try getting a separate room humidifier machine.    Airleaks: Please call DME as they may need to adjust your mask or refit you with a different kind or different size of mask. In addition, you can ask DME for tips on getting a good mask seal and mask fit.     Difficulty tolerating the mask: Contact your DME to try a different kind of mask and/or call office to get a referral to Sleep Psychologist for CPAP desensitization. CPAP desensitization technique is a set of strategies that helps patient cope with claustrophobia and anxiety related to wearing mask. Alternatively, we can do a daytime mini-sleep study called PAP-nap trial wherein you will try on different kinds of mask and the sleep technician will try different pressure settings on CPAP and BPAP machines to see which specific pressure is tolerable and comfortable for you.     Water droplets or moisture within the hose and/or mask: This is called  "rain-out and it is caused by condensation of too much heated humidity on the cooler walls of the hose. If you have rain-out, turn down humidity settings or get a heated hose. If you already have a heated hose, turn up the \"tube temperature\" of the heated hose. Alternatively, if you don't want to get a heated hose or warmer air, may wrap the CPAP hose with stockings to keep it somewhat warm. Also, you need to place the machine on the floor and lower the hose so that water won't travel upward towards your mask.     You can also go to the following EDUCATION WEBSITES for further information:   American Academy of Sleep Medicine http://sleepeducation.org  National Sleep Foundation: https://sleepfoundation.org  American Sleep Apnea Association: https://www.sleepapnea.org (for patients with sleep apnea)  Narcolepsy Network: https://www.narcolepsynetwork.org (for patients with narcolepsy)  [employer]: https://www.wakeupAutism Home Support Servicescolepsy.org (for patients with narcolepsy)  Hypersomnia Foundation: https://www.hypersomniafoundation.org (for patients with idiopathic hypersomnia)  RLS foundation: https://www.rls.org (for patients with restless leg syndrome)    IMPORTANT INFORMATION     Call 911 for medical emergencies.  Our offices are generally open from Monday-Friday, 8 am - 5 pm.   There are no supporting services by either the sleep doctors or their staff on weekends and Holidays, or after 5 PM on weekdays.   If you need to get in touch with me, you may either call my office number or you can use [employer].  If a referral for a test, for CPAP, or for another specialist was made, and you have not heard about scheduling this within a week, please call scheduling at [phone] ([phone]).  If you are unable to make your appointment for clinic or an overnight study, kindly call the office or sleep testing center at least 48 hours in advance to cancel and reschedule.  If you are on CPAP, please bring your device's card and/or the " device to each clinic appointment.   In case of problems with PAP machine or mask interface, please contact your DME (Durable Medical Equipment) company first. DME is the company who provides you the machine and/or PAP supplies.       PRESCRIPTIONS     We require 7 days advanced notice for prescription refills. If we do not receive the request in this time, we cannot guarantee that your medication will be refilled in time.    IMPORTANT PHONE NUMBERS     Sleep Medicine Clinic Fax: [phone]  Appointments (for Pediatric Sleep Clinic): [phone] - option 1  Appointments (for Adult Sleep Clinic): [phone] - option 2  Appointments (For Sleep Studies): [phone] - option 3  Behavioral Sleep Medicine: [phone]  Sleep Surgery: [phone]  Nutrition Service: [phone]  Weight management clinics with endocrinology: [phone]  Bariatric Services: [phone] (includes weight loss medications and weight loss surgery)  [facility]: [phone] (offers holistic approaches to weight management)  ENT (Otolaryngology): [phone]  Headache Clinic (Neurology): [phone]  Neurology: [phone]  Psychiatry: [phone]  Pulmonary Function Testing (PFT) Center: [phone]  Pulmonary Medicine: [phone]  [employer] (DME): [phone]      OUR SLEEP TESTING LOCATIONS     Our team will contact you to schedule your sleep study, however, you can contact us as follow:  Main Phone Line (scheduling only): [phone] ([phone]), option 3  Adult and Pediatric Locations  [facility] (6 years and older): [facility] - 4th floor ([address] [place] [address]) After hours line: [phone]  [facility] (Main campus: All ages): [facility], 6th floor. After hours line: [phone]  [facility] (5 years and older; younger considered on case-by-case basis): [address] [facility]; [employer] [address], [address] Scheduling  After hours line: [phone]   [place] (6 years and older): [address]; [address]; [address]   [place] (6 years and older): [address] [facility] [address]  After hours line: [phone]   [place] (13 years and older) in [place]: [address], 2nd floor  After hours line: [phone]   [place] (13 year and older): [address]  After hours line: [phone]     Adult Only Locations:   [place] (18 years and older): [address] [facility], 2nd floor   [place] (18 years and older): [address] [facility]; 4th floor  After hours line: [phone]   [place] (18 years and older) at [place]: [address] [facility]  After hours line: [phone]     CONTACTING YOUR SLEEP MEDICINE PROVIDER AND SLEEP TEAM      For issues with your machine or mask interface, please call your DME provider first. DME stands for durable medical company. DME is the company who provides you the machine and/or PAP supplies / accessories.   To schedule, cancel, or reschedule SLEEP STUDY APPOINTMENTS, please call the Main Phone Line at [phone] - option 3.   To schedule, cancel, or reschedule CLINIC APPOINTMENTS, you can do it in \"MyChart\", call [phone] to speak with my  (Judy Giles), or call the Main Phone Line at [phone] - option 2  For CLINICAL QUESTIONS or MEDICATION REFILLS, please call direct line for Adult Sleep Nurses at [phone].   Lastly, you can also send a message directly to your provider through \"My Chart\", which is the email service through your  Records Account: [email or web address]       Here at [facility], we wish you a restful sleep!   "

## 2024-09-08 RX ORDER — LEVOTHYROXINE SODIUM 112 UG/1
112 TABLET ORAL DAILY
Qty: 30 TABLET | Refills: 1 | Status: SHIPPED | OUTPATIENT
Start: 2024-09-08 | End: 2024-11-07

## 2024-09-10 ENCOUNTER — TELEPHONE (OUTPATIENT)
Dept: SURGERY | Facility: CLINIC | Age: 39
End: 2024-09-10
Payer: COMMERCIAL

## 2024-09-10 NOTE — TELEPHONE ENCOUNTER
Called patient to set up her pre op appointment. Patient also asked if I could resend her the 2 week diet.

## 2024-09-13 ENCOUNTER — TELEPHONE (OUTPATIENT)
Dept: SURGERY | Facility: CLINIC | Age: 39
End: 2024-09-13
Payer: COMMERCIAL

## 2024-09-13 NOTE — TELEPHONE ENCOUNTER
Called pt to discuss 2 week pre op diet which they are to start on 9/17. LVM and encouraged the pt to contact this RD if she has any questions.     She Allen MS, RD, LD  Phone: [phone]

## 2024-09-16 ENCOUNTER — TELEPHONE (OUTPATIENT)
Dept: SURGERY | Facility: CLINIC | Age: 39
End: 2024-09-16
Payer: COMMERCIAL

## 2024-09-16 NOTE — TELEPHONE ENCOUNTER
Thank you for speaking with me earlier today & confirming your scheduled visit with Dr. Kwok on 9/20/24 at 2:00 pm at [facility] (inside [facility], main floor in the Specialty Clinic).  Please arrive before 1:00 pm for a Preoperative class taught by Dr. Kwok and RN.  Parking is available at a cost of $5.00 at the Main Entrance.  We look forward to seeing you!  Jessica Singh RN, Clinic Nurse

## 2024-09-17 ENCOUNTER — PREP FOR PROCEDURE (OUTPATIENT)
Dept: SURGERY | Facility: CLINIC | Age: 39
End: 2024-09-17
Payer: COMMERCIAL

## 2024-09-17 DIAGNOSIS — E66.01 MORBID OBESITY (MULTI): ICD-10-CM

## 2024-09-17 DIAGNOSIS — E66.01 MORBID OBESITY DUE TO EXCESS CALORIES (MULTI): Primary | ICD-10-CM

## 2024-09-17 RX ORDER — APREPITANT 40 MG/1
40 CAPSULE ORAL ONCE
OUTPATIENT
Start: 2024-09-17 | End: 2024-09-17

## 2024-09-17 RX ORDER — SODIUM CHLORIDE, SODIUM LACTATE, POTASSIUM CHLORIDE, CALCIUM CHLORIDE 600; 310; 30; 20 MG/100ML; MG/100ML; MG/100ML; MG/100ML
125 INJECTION, SOLUTION INTRAVENOUS CONTINUOUS
OUTPATIENT
Start: 2024-09-17

## 2024-09-17 RX ORDER — CEFAZOLIN SODIUM 2 G/100ML
2 INJECTION, SOLUTION INTRAVENOUS ONCE
OUTPATIENT
Start: 2024-09-17 | End: 2024-09-17

## 2024-09-17 RX ORDER — CELECOXIB 400 MG/1
400 CAPSULE ORAL ONCE
OUTPATIENT
Start: 2024-09-17 | End: 2024-09-17

## 2024-09-17 RX ORDER — SCOLOPAMINE TRANSDERMAL SYSTEM 1 MG/1
1 PATCH, EXTENDED RELEASE TRANSDERMAL ONCE
OUTPATIENT
Start: 2024-09-17 | End: 2024-09-17

## 2024-09-17 RX ORDER — ACETAMINOPHEN 325 MG/1
650 TABLET ORAL ONCE
OUTPATIENT
Start: 2024-09-17 | End: 2024-09-17

## 2024-09-17 RX ORDER — HEPARIN SODIUM 5000 [USP'U]/ML
5000 INJECTION, SOLUTION INTRAVENOUS; SUBCUTANEOUS ONCE
OUTPATIENT
Start: 2024-09-17 | End: 2024-09-17

## 2024-09-17 RX ORDER — GABAPENTIN 300 MG/1
600 CAPSULE ORAL ONCE
OUTPATIENT
Start: 2024-09-17 | End: 2024-09-17

## 2024-09-18 DIAGNOSIS — I10 BENIGN ESSENTIAL HYPERTENSION: ICD-10-CM

## 2024-09-19 RX ORDER — LOSARTAN POTASSIUM 25 MG/1
25 TABLET ORAL DAILY
Qty: 90 TABLET | Refills: 0 | Status: SHIPPED | OUTPATIENT
Start: 2024-09-19 | End: 2024-12-18

## 2024-09-20 ENCOUNTER — APPOINTMENT (OUTPATIENT)
Dept: SURGERY | Facility: CLINIC | Age: 39
End: 2024-09-20
Payer: COMMERCIAL

## 2024-09-20 ENCOUNTER — LAB (OUTPATIENT)
Dept: LAB | Facility: LAB | Age: 39
End: 2024-09-20
Payer: COMMERCIAL

## 2024-09-20 VITALS
OXYGEN SATURATION: 95 % | WEIGHT: 258 LBS | HEART RATE: 89 BPM | SYSTOLIC BLOOD PRESSURE: 119 MMHG | DIASTOLIC BLOOD PRESSURE: 82 MMHG | BODY MASS INDEX: 40.49 KG/M2 | HEIGHT: 67 IN

## 2024-09-20 DIAGNOSIS — D68.51 FACTOR V LEIDEN (MULTI): ICD-10-CM

## 2024-09-20 DIAGNOSIS — Z98.84 S/P LAPAROSCOPIC SLEEVE GASTRECTOMY: ICD-10-CM

## 2024-09-20 DIAGNOSIS — E66.01 MORBID OBESITY (MULTI): ICD-10-CM

## 2024-09-20 DIAGNOSIS — E03.9 HYPOTHYROIDISM, UNSPECIFIED TYPE: ICD-10-CM

## 2024-09-20 DIAGNOSIS — E66.01 MORBID OBESITY (MULTI): Primary | ICD-10-CM

## 2024-09-20 DIAGNOSIS — G47.33 OBSTRUCTIVE SLEEP APNEA (ADULT) (PEDIATRIC): ICD-10-CM

## 2024-09-20 DIAGNOSIS — I10 PRIMARY HYPERTENSION: ICD-10-CM

## 2024-09-20 LAB
ABO GROUP (TYPE) IN BLOOD: NORMAL
ANTIBODY SCREEN: NORMAL
ERYTHROCYTE [DISTWIDTH] IN BLOOD BY AUTOMATED COUNT: 12.7 % (ref 11.5–14.5)
HCG UR QL IA.RAPID: NEGATIVE
HCT VFR BLD AUTO: 44.9 % (ref 36–46)
HGB BLD-MCNC: 15.2 G/DL (ref 12–16)
INR PPP: 1 (ref 0.9–1.1)
MCH RBC QN AUTO: 31.8 PG (ref 26–34)
MCHC RBC AUTO-ENTMCNC: 33.9 G/DL (ref 32–36)
MCV RBC AUTO: 94 FL (ref 80–100)
NRBC BLD-RTO: 0 /100 WBCS (ref 0–0)
PLATELET # BLD AUTO: 279 X10*3/UL (ref 150–450)
PROTHROMBIN TIME: 11.8 SECONDS (ref 9.8–12.8)
RBC # BLD AUTO: 4.78 X10*6/UL (ref 4–5.2)
RH FACTOR (ANTIGEN D): NORMAL
WBC # BLD AUTO: 8.7 X10*3/UL (ref 4.4–11.3)

## 2024-09-20 PROCEDURE — 80323 ALKALOIDS NOS: CPT

## 2024-09-20 PROCEDURE — 1036F TOBACCO NON-USER: CPT | Performed by: SURGERY

## 2024-09-20 PROCEDURE — 85610 PROTHROMBIN TIME: CPT

## 2024-09-20 PROCEDURE — 85027 COMPLETE CBC AUTOMATED: CPT

## 2024-09-20 PROCEDURE — 81025 URINE PREGNANCY TEST: CPT

## 2024-09-20 PROCEDURE — 3079F DIAST BP 80-89 MM HG: CPT | Performed by: SURGERY

## 2024-09-20 PROCEDURE — 86901 BLOOD TYPING SEROLOGIC RH(D): CPT

## 2024-09-20 PROCEDURE — 86900 BLOOD TYPING SEROLOGIC ABO: CPT

## 2024-09-20 PROCEDURE — 36415 COLL VENOUS BLD VENIPUNCTURE: CPT

## 2024-09-20 PROCEDURE — 3008F BODY MASS INDEX DOCD: CPT | Performed by: SURGERY

## 2024-09-20 PROCEDURE — 3074F SYST BP LT 130 MM HG: CPT | Performed by: SURGERY

## 2024-09-20 PROCEDURE — 86850 RBC ANTIBODY SCREEN: CPT

## 2024-09-20 PROCEDURE — 80053 COMPREHEN METABOLIC PANEL: CPT

## 2024-09-20 PROCEDURE — 99215 OFFICE O/P EST HI 40 MIN: CPT | Performed by: SURGERY

## 2024-09-20 RX ORDER — ONDANSETRON 4 MG/1
4 TABLET, FILM COATED ORAL EVERY 6 HOURS PRN
Qty: 15 TABLET | Refills: 1 | Status: SHIPPED | OUTPATIENT
Start: 2024-09-20

## 2024-09-20 RX ORDER — ENOXAPARIN SODIUM 100 MG/ML
40 INJECTION SUBCUTANEOUS 2 TIMES DAILY
Qty: 56 EACH | Refills: 0 | Status: SHIPPED | OUTPATIENT
Start: 2024-09-20 | End: 2024-10-18

## 2024-09-20 RX ORDER — OMEPRAZOLE 40 MG/1
40 CAPSULE, DELAYED RELEASE ORAL
Qty: 30 CAPSULE | Refills: 5 | Status: SHIPPED | OUTPATIENT
Start: 2024-09-20

## 2024-09-20 RX ORDER — OXYCODONE HYDROCHLORIDE 5 MG/1
5 TABLET ORAL EVERY 6 HOURS PRN
Qty: 24 TABLET | Refills: 0 | Status: SHIPPED | OUTPATIENT
Start: 2024-09-20 | End: 2024-09-26

## 2024-09-20 NOTE — PROGRESS NOTES
"BARIATRIC SURGERY PREOPERATIVE VISIT    Date: 09/20/24  Time: 2:42 PM    Name: Tiffanie Burdick    MRN: [id number]    This is a 39 y.o. y.o. female with morbid obesity (Body mass index is 40.41 kg/m².) who plans to undergo Laparoscopic sleeve gastrectomy  88422 surgery. They have completed a rigorous preoperative medical work-up and bariatric surgery educational program.     PMH:   Patient Active Problem List   Diagnosis    Arthropathy    Daily headache    Depression with anxiety    Factor V Leiden (Multi)    Fatigue    Hypothyroidism, adult    Migraine    Acute tonsillitis    Arthralgia    Chemical exposure    Chest pain    Exposure to mercury    Flushing    Hashimoto's thyroiditis    Headache    History of migraine    Mold exposure    Morbid obesity (Multi)    Mouth ulceration    Pain of left breast    Rash and other nonspecific skin eruption    Renal pain    Rib pain    Stress    Subacute cough    Upper respiratory tract infection    Vitamin D deficiency    Weight gain    Primary hypertension    Factor V Leiden mutation (Multi)    Hypothyroidism    Menstrual migraine    Migraine without aura    Migraine headache    Anxiety    Bariatric surgery status    Obstructive sleep apnea (adult) (pediatric)       PSH:   Past Surgical History:   Procedure Laterality Date    OTHER SURGICAL HISTORY  04/19/2017    Cholecystotomy    OTHER SURGICAL HISTORY  03/07/2022    Appendectomy    OTHER SURGICAL HISTORY  03/07/2022    Kidney surgery       Social hx:   Social History     Socioeconomic History    Marital status:      Spouse name: Not on file    Number of children: Not on file    Years of education: Not on file    Highest education level: Not on file   Occupational History    Not on file   Tobacco Use    Smoking status: Never     Passive exposure: Never    Smokeless tobacco: Never    Tobacco comments:     9/16/24:  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 BOUBACAR RN    Sexual activity: Yes     Partners: Female     Birth control/protection: Male Sterilization   Other Topics Concern    Not on file   Social History Narrative    Not on file     Social Determinants of Health     Financial Resource Strain: Not on file   Food Insecurity: Not on file   Transportation Needs: Not on file   Physical Activity: Not on file   Stress: Not on file   Social Connections: Not on file   Intimate Partner Violence: Not on file   Housing Stability: Not on file       Initial weight: 263 lbs  Current weight:   Vitals:    09/20/24 1409   Weight: 117 kg (258 lb)       Preop Clearances:  Cardiac: Cleared  Pulmonary: Cleared  Psych: Cleared    History of Clotting Disorder: Factor 5 Leiden mutation  Anticoagulation plan: 4 weeks post op lovenox    Other: none    Sleep Study: Compliant with CPAP:      EGD: Findings: normal anatomy      MEDICATIONS:  Prior to Admission Medications:    Current Outpatient Medications:     levothyroxine (Synthroid, Levoxyl) 112 mcg tablet, Take 1 tablet (112 mcg) by mouth early in the morning.. Take on an empty stomach at the same time each day, either 30 to 60 minutes prior to breakfast, Disp: 30 tablet, Rfl: 1    multivitamin with minerals tablet, Take 1 tablet by mouth once daily. 3/20/24 [facility], Disp: , Rfl:     SUMAtriptan (Imitrex) 100 mg tablet, Take by mouth., Disp: , Rfl:     enoxaparin (Lovenox) 40 mg/0.4 mL syringe, Inject 0.4 mL (40 mg) under the skin 2 times a day for 28 days., Disp: 56 each, Rfl: 0    losartan (Cozaar) 25 mg tablet, Take 1 tablet (25 mg) by mouth once daily., Disp: 90 tablet, Rfl: 0    omeprazole (PriLOSEC) 40 mg  DR capsule, Take 1 capsule (40 mg) by mouth once daily in the morning. Take before meals. Do not crush or chew. Open capsule, sprinkle beads on SF jello, pudding or applesauce., Disp: 30 capsule, Rfl: 5    ondansetron (Zofran) 4 mg tablet, Take 1 tablet (4 mg) by mouth every 6 hours if needed for nausea or vomiting., Disp: 15 tablet, Rfl: 1    oxyCODONE (Roxicodone) 5 mg immediate release tablet, Take 1 tablet (5 mg) by mouth every 6 hours if needed for severe pain (7 - 10) or moderate pain (4 - 6) for up to 6 days., Disp: 24 tablet, Rfl: 0    ALLERGIES:  Allergies   Allergen Reactions    Amitriptyline Confusion    Ampicillin Hives    Reglan [Metoclopramide Hcl] Confusion       REVIEW OF SYSTEMS:  GENERAL: Obese. Negative for malaise, significant weight loss and fever  NECK: Negative for lumps, goiter, pain and significant neck swelling  RESPIRATORY: Negative for cough, wheezing or shortness of breath.  CARDIOVASCULAR: Negative for chest pain, leg swelling or palpitations.  GI: Negative for abdominal discomfort, blood in stools or black stools or change in bowel habits  : No history of dysuria, frequency or incontinence  MUSCULOSKELETAL: Negative for joint pain or swelling, back pain or muscle pain.  SKIN: Negative for lesions, rash, and itching.  PSYCH: Negative for sleep disturbance, mood disorder and recent psychosocial stressors.  ENDOCRINE: Negative for cold or heat intolerance, polyuria, polydipsia and goiter.    PHYSICAL EXAM:  Visit Vitals  /82 (BP Location: Right arm, Patient Position: Sitting, BP Cuff Size: Large adult long)   Pulse 89       General appearance: obese  Skin: warm, no erythema or rashes  Lungs: clear to percussion and auscultation  Heart: regular rhythm and S1, S2 normal  Abdomen: soft, non-tender, no masses, no organomegaly  Extremities: Normal exam of the extremities. No swelling or pain.    No results found for this or any previous visit (from the past 24  hour(s)).    IMPRESSION:  Tiffanie Burdick is a 39 y.o. y.o. female with a BMI of Body mass index is 40.41 kg/m²..    They have been preoperatively evaluated and deemed to be an appropriate candidate for bariatric surgery.  Surgery Type: Laparoscopic sleeve gastrectomy  45873    All testing reviewed.  All clearances contained.    PLAN:    VTE risk calculator score of 0.16% but she has a Factor 5 Leiden mutation, so the patient will require  4  weeks of extended VTE prophylaxis with Lovenox subcutaneous.    The risks of Laparoscopic sleeve gastrectomy  64142 surgery including bleeding, leak, wound infection, dehydration, ulcers, internal hernia, DVT/PE, prolonged nausea/vomiting, incomplete resolution of associated medical conditions, reflux, weight regain, vitamin/mineral deficiencies, and death have been explained to the patient and Tiffanie Burdick has expressed understanding and acceptance of them.    The benefits of the above surgery including weight loss, improvement/resolution of associated medical and mental health conditions, improved mobility, and decreased mortality have been explained the the patient and Tiffanie Burdick has expressed understanding and acceptance of them.    Operative and blood transfusion consent forms were signed by the patient and witnessed today.    Prescriptions for all required post-operative home medications were sent to the [facility] pharmacy today and will be delivered to the patient's bedside on the day of discharge.    Further education was provided on day of surgery instructions and what to expect from the inpatient admission after surgery.    Zachary Kwok MD  Bariatric and Minimally Invasive General Surgery

## 2024-09-20 NOTE — H&P (VIEW-ONLY)
"BARIATRIC SURGERY PREOPERATIVE VISIT    Date: 09/20/24  Time: 2:42 PM    Name: Tiffanie Burdick    MRN: [id number]    This is a 39 y.o. y.o. female with morbid obesity (Body mass index is 40.41 kg/m².) who plans to undergo Laparoscopic sleeve gastrectomy  28547 surgery. They have completed a rigorous preoperative medical work-up and bariatric surgery educational program.     PMH:   Patient Active Problem List   Diagnosis    Arthropathy    Daily headache    Depression with anxiety    Factor V Leiden (Multi)    Fatigue    Hypothyroidism, adult    Migraine    Acute tonsillitis    Arthralgia    Chemical exposure    Chest pain    Exposure to mercury    Flushing    Hashimoto's thyroiditis    Headache    History of migraine    Mold exposure    Morbid obesity (Multi)    Mouth ulceration    Pain of left breast    Rash and other nonspecific skin eruption    Renal pain    Rib pain    Stress    Subacute cough    Upper respiratory tract infection    Vitamin D deficiency    Weight gain    Primary hypertension    Factor V Leiden mutation (Multi)    Hypothyroidism    Menstrual migraine    Migraine without aura    Migraine headache    Anxiety    Bariatric surgery status    Obstructive sleep apnea (adult) (pediatric)       PSH:   Past Surgical History:   Procedure Laterality Date    OTHER SURGICAL HISTORY  04/19/2017    Cholecystotomy    OTHER SURGICAL HISTORY  03/07/2022    Appendectomy    OTHER SURGICAL HISTORY  03/07/2022    Kidney surgery       Social hx:   Social History     Socioeconomic History    Marital status:      Spouse name: Not on file    Number of children: Not on file    Years of education: Not on file    Highest education level: Not on file   Occupational History    Not on file   Tobacco Use    Smoking status: Never     Passive exposure: Never    Smokeless tobacco: Never    Tobacco comments:     9/16/24:  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 BOUBACAR RN    Sexual activity: Yes     Partners: Female     Birth control/protection: Male Sterilization   Other Topics Concern    Not on file   Social History Narrative    Not on file     Social Determinants of Health     Financial Resource Strain: Not on file   Food Insecurity: Not on file   Transportation Needs: Not on file   Physical Activity: Not on file   Stress: Not on file   Social Connections: Not on file   Intimate Partner Violence: Not on file   Housing Stability: Not on file       Initial weight: 263 lbs  Current weight:   Vitals:    09/20/24 1409   Weight: 117 kg (258 lb)       Preop Clearances:  Cardiac: Cleared  Pulmonary: Cleared  Psych: Cleared    History of Clotting Disorder: Factor 5 Leiden mutation  Anticoagulation plan: 4 weeks post op lovenox    Other: none    Sleep Study: Compliant with CPAP:      EGD: Findings: normal anatomy      MEDICATIONS:  Prior to Admission Medications:    Current Outpatient Medications:     levothyroxine (Synthroid, Levoxyl) 112 mcg tablet, Take 1 tablet (112 mcg) by mouth early in the morning.. Take on an empty stomach at the same time each day, either 30 to 60 minutes prior to breakfast, Disp: 30 tablet, Rfl: 1    multivitamin with minerals tablet, Take 1 tablet by mouth once daily. 3/20/24 [facility], Disp: , Rfl:     SUMAtriptan (Imitrex) 100 mg tablet, Take by mouth., Disp: , Rfl:     enoxaparin (Lovenox) 40 mg/0.4 mL syringe, Inject 0.4 mL (40 mg) under the skin 2 times a day for 28 days., Disp: 56 each, Rfl: 0    losartan (Cozaar) 25 mg tablet, Take 1 tablet (25 mg) by mouth once daily., Disp: 90 tablet, Rfl: 0    omeprazole (PriLOSEC) 40 mg  DR capsule, Take 1 capsule (40 mg) by mouth once daily in the morning. Take before meals. Do not crush or chew. Open capsule, sprinkle beads on SF jello, pudding or applesauce., Disp: 30 capsule, Rfl: 5    ondansetron (Zofran) 4 mg tablet, Take 1 tablet (4 mg) by mouth every 6 hours if needed for nausea or vomiting., Disp: 15 tablet, Rfl: 1    oxyCODONE (Roxicodone) 5 mg immediate release tablet, Take 1 tablet (5 mg) by mouth every 6 hours if needed for severe pain (7 - 10) or moderate pain (4 - 6) for up to 6 days., Disp: 24 tablet, Rfl: 0    ALLERGIES:  Allergies   Allergen Reactions    Amitriptyline Confusion    Ampicillin Hives    Reglan [Metoclopramide Hcl] Confusion       REVIEW OF SYSTEMS:  GENERAL: Obese. Negative for malaise, significant weight loss and fever  NECK: Negative for lumps, goiter, pain and significant neck swelling  RESPIRATORY: Negative for cough, wheezing or shortness of breath.  CARDIOVASCULAR: Negative for chest pain, leg swelling or palpitations.  GI: Negative for abdominal discomfort, blood in stools or black stools or change in bowel habits  : No history of dysuria, frequency or incontinence  MUSCULOSKELETAL: Negative for joint pain or swelling, back pain or muscle pain.  SKIN: Negative for lesions, rash, and itching.  PSYCH: Negative for sleep disturbance, mood disorder and recent psychosocial stressors.  ENDOCRINE: Negative for cold or heat intolerance, polyuria, polydipsia and goiter.    PHYSICAL EXAM:  Visit Vitals  /82 (BP Location: Right arm, Patient Position: Sitting, BP Cuff Size: Large adult long)   Pulse 89       General appearance: obese  Skin: warm, no erythema or rashes  Lungs: clear to percussion and auscultation  Heart: regular rhythm and S1, S2 normal  Abdomen: soft, non-tender, no masses, no organomegaly  Extremities: Normal exam of the extremities. No swelling or pain.    No results found for this or any previous visit (from the past 24  hour(s)).    IMPRESSION:  Tiffanie Burdick is a 39 y.o. y.o. female with a BMI of Body mass index is 40.41 kg/m²..    They have been preoperatively evaluated and deemed to be an appropriate candidate for bariatric surgery.  Surgery Type: Laparoscopic sleeve gastrectomy  95352    All testing reviewed.  All clearances contained.    PLAN:    VTE risk calculator score of 0.16% but she has a Factor 5 Leiden mutation, so the patient will require  4  weeks of extended VTE prophylaxis with Lovenox subcutaneous.    The risks of Laparoscopic sleeve gastrectomy  45611 surgery including bleeding, leak, wound infection, dehydration, ulcers, internal hernia, DVT/PE, prolonged nausea/vomiting, incomplete resolution of associated medical conditions, reflux, weight regain, vitamin/mineral deficiencies, and death have been explained to the patient and Tiffanie Burdick has expressed understanding and acceptance of them.    The benefits of the above surgery including weight loss, improvement/resolution of associated medical and mental health conditions, improved mobility, and decreased mortality have been explained the the patient and Tiffanie Burdick has expressed understanding and acceptance of them.    Operative and blood transfusion consent forms were signed by the patient and witnessed today.    Prescriptions for all required post-operative home medications were sent to the [facility] pharmacy today and will be delivered to the patient's bedside on the day of discharge.    Further education was provided on day of surgery instructions and what to expect from the inpatient admission after surgery.    Zachary Kwok MD  Bariatric and Minimally Invasive General Surgery

## 2024-09-21 LAB
ALBUMIN SERPL BCP-MCNC: 5 G/DL (ref 3.4–5)
ALP SERPL-CCNC: 40 U/L (ref 33–110)
ALT SERPL W P-5'-P-CCNC: 22 U/L (ref 7–45)
ANION GAP SERPL CALC-SCNC: 15 MMOL/L (ref 10–20)
AST SERPL W P-5'-P-CCNC: 19 U/L (ref 9–39)
BILIRUB SERPL-MCNC: 1 MG/DL (ref 0–1.2)
BUN SERPL-MCNC: 21 MG/DL (ref 6–23)
CALCIUM SERPL-MCNC: 9.9 MG/DL (ref 8.6–10.6)
CHLORIDE SERPL-SCNC: 102 MMOL/L (ref 98–107)
CO2 SERPL-SCNC: 24 MMOL/L (ref 21–32)
CREAT SERPL-MCNC: 0.82 MG/DL (ref 0.5–1.05)
EGFRCR SERPLBLD CKD-EPI 2021: >90 ML/MIN/1.73M*2
GLUCOSE SERPL-MCNC: 72 MG/DL (ref 74–99)
POTASSIUM SERPL-SCNC: 4.7 MMOL/L (ref 3.5–5.3)
PROT SERPL-MCNC: 7.4 G/DL (ref 6.4–8.2)
SODIUM SERPL-SCNC: 136 MMOL/L (ref 136–145)

## 2024-09-25 LAB
COTININE SERPL-MCNC: <5 NG/ML
NICOTINE SERPL-MCNC: <5 NG/ML

## 2024-09-26 ENCOUNTER — ANESTHESIA EVENT (OUTPATIENT)
Dept: OPERATING ROOM | Facility: HOSPITAL | Age: 39
End: 2024-09-26
Payer: COMMERCIAL

## 2024-09-27 ENCOUNTER — APPOINTMENT (OUTPATIENT)
Dept: SLEEP MEDICINE | Facility: CLINIC | Age: 39
End: 2024-09-27
Payer: COMMERCIAL

## 2024-10-01 ENCOUNTER — HOSPITAL ENCOUNTER (INPATIENT)
Facility: HOSPITAL | Age: 39
LOS: 1 days | Discharge: HOME | End: 2024-10-02
Attending: SURGERY | Admitting: SURGERY
Payer: COMMERCIAL

## 2024-10-01 ENCOUNTER — ANESTHESIA (OUTPATIENT)
Dept: OPERATING ROOM | Facility: HOSPITAL | Age: 39
End: 2024-10-01
Payer: COMMERCIAL

## 2024-10-01 DIAGNOSIS — E66.01 MORBID OBESITY (MULTI): ICD-10-CM

## 2024-10-01 DIAGNOSIS — E66.01 MORBID OBESITY DUE TO EXCESS CALORIES (MULTI): Primary | ICD-10-CM

## 2024-10-01 DIAGNOSIS — Z98.84 S/P LAPAROSCOPIC SLEEVE GASTRECTOMY: ICD-10-CM

## 2024-10-01 PROBLEM — R11.2 PONV (POSTOPERATIVE NAUSEA AND VOMITING): Status: ACTIVE | Noted: 2024-10-01

## 2024-10-01 PROBLEM — Z98.890 PONV (POSTOPERATIVE NAUSEA AND VOMITING): Status: ACTIVE | Noted: 2024-10-01

## 2024-10-01 LAB
ABO GROUP (TYPE) IN BLOOD: NORMAL
PREGNANCY TEST URINE, POC: NEGATIVE
PREGNANCY TEST URINE, POC: NEGATIVE
RH FACTOR (ANTIGEN D): NORMAL

## 2024-10-01 PROCEDURE — 0DB64Z3 EXCISION OF STOMACH, PERCUTANEOUS ENDOSCOPIC APPROACH, VERTICAL: ICD-10-PCS | Performed by: SURGERY

## 2024-10-01 PROCEDURE — 88307 TISSUE EXAM BY PATHOLOGIST: CPT | Mod: TC,GEALAB | Performed by: SURGERY

## 2024-10-01 PROCEDURE — 3700000002 HC GENERAL ANESTHESIA TIME - EACH INCREMENTAL 1 MINUTE: Performed by: SURGERY

## 2024-10-01 PROCEDURE — 43775 LAP SLEEVE GASTRECTOMY: CPT | Performed by: SURGERY

## 2024-10-01 PROCEDURE — 2500000004 HC RX 250 GENERAL PHARMACY W/ HCPCS (ALT 636 FOR OP/ED): Performed by: ANESTHESIOLOGY

## 2024-10-01 PROCEDURE — A43775 PR LAP, GAST RESTRICT PROC, LONGITUDINAL GASTRECTOMY: Performed by: NURSE ANESTHETIST, CERTIFIED REGISTERED

## 2024-10-01 PROCEDURE — 7100000001 HC RECOVERY ROOM TIME - INITIAL BASE CHARGE: Performed by: SURGERY

## 2024-10-01 PROCEDURE — 3600000004 HC OR TIME - INITIAL BASE CHARGE - PROCEDURE LEVEL FOUR: Performed by: SURGERY

## 2024-10-01 PROCEDURE — 2500000005 HC RX 250 GENERAL PHARMACY W/O HCPCS: Performed by: NURSE ANESTHETIST, CERTIFIED REGISTERED

## 2024-10-01 PROCEDURE — 2500000005 HC RX 250 GENERAL PHARMACY W/O HCPCS: Performed by: SURGERY

## 2024-10-01 PROCEDURE — 7100000002 HC RECOVERY ROOM TIME - EACH INCREMENTAL 1 MINUTE: Performed by: SURGERY

## 2024-10-01 PROCEDURE — 2720000007 HC OR 272 NO HCPCS: Performed by: SURGERY

## 2024-10-01 PROCEDURE — 36415 COLL VENOUS BLD VENIPUNCTURE: CPT | Performed by: SURGERY

## 2024-10-01 PROCEDURE — 81025 URINE PREGNANCY TEST: CPT | Performed by: ANESTHESIOLOGY

## 2024-10-01 PROCEDURE — 2780000003 HC OR 278 NO HCPCS: Performed by: SURGERY

## 2024-10-01 PROCEDURE — RXMED WILLOW AMBULATORY MEDICATION CHARGE

## 2024-10-01 PROCEDURE — 3700000001 HC GENERAL ANESTHESIA TIME - INITIAL BASE CHARGE: Performed by: SURGERY

## 2024-10-01 PROCEDURE — 2500000004 HC RX 250 GENERAL PHARMACY W/ HCPCS (ALT 636 FOR OP/ED): Mod: JZ | Performed by: SURGERY

## 2024-10-01 PROCEDURE — 81025 URINE PREGNANCY TEST: CPT | Performed by: SURGERY

## 2024-10-01 PROCEDURE — A43775 PR LAP, GAST RESTRICT PROC, LONGITUDINAL GASTRECTOMY: Performed by: ANESTHESIOLOGY

## 2024-10-01 PROCEDURE — 2500000004 HC RX 250 GENERAL PHARMACY W/ HCPCS (ALT 636 FOR OP/ED): Performed by: NURSE ANESTHETIST, CERTIFIED REGISTERED

## 2024-10-01 PROCEDURE — 3E0T3BZ INTRODUCTION OF ANESTHETIC AGENT INTO PERIPHERAL NERVES AND PLEXI, PERCUTANEOUS APPROACH: ICD-10-PCS | Performed by: SURGERY

## 2024-10-01 PROCEDURE — 2500000004 HC RX 250 GENERAL PHARMACY W/ HCPCS (ALT 636 FOR OP/ED): Performed by: SURGERY

## 2024-10-01 PROCEDURE — 94760 N-INVAS EAR/PLS OXIMETRY 1: CPT

## 2024-10-01 PROCEDURE — 2500000005 HC RX 250 GENERAL PHARMACY W/O HCPCS: Performed by: ANESTHESIOLOGY

## 2024-10-01 PROCEDURE — 9420000001 HC RT PATIENT EDUCATION 5 MIN

## 2024-10-01 PROCEDURE — 0DJ08ZZ INSPECTION OF UPPER INTESTINAL TRACT, VIA NATURAL OR ARTIFICIAL OPENING ENDOSCOPIC: ICD-10-PCS | Performed by: SURGERY

## 2024-10-01 PROCEDURE — 2500000001 HC RX 250 WO HCPCS SELF ADMINISTERED DRUGS (ALT 637 FOR MEDICARE OP): Performed by: SURGERY

## 2024-10-01 PROCEDURE — 3600000009 HC OR TIME - EACH INCREMENTAL 1 MINUTE - PROCEDURE LEVEL FOUR: Performed by: SURGERY

## 2024-10-01 PROCEDURE — 2500000002 HC RX 250 W HCPCS SELF ADMINISTERED DRUGS (ALT 637 FOR MEDICARE OP, ALT 636 FOR OP/ED): Performed by: SURGERY

## 2024-10-01 PROCEDURE — 1100000001 HC PRIVATE ROOM DAILY

## 2024-10-01 PROCEDURE — 96372 THER/PROPH/DIAG INJ SC/IM: CPT | Performed by: SURGERY

## 2024-10-01 RX ORDER — PANTOPRAZOLE SODIUM 40 MG/10ML
40 INJECTION, POWDER, LYOPHILIZED, FOR SOLUTION INTRAVENOUS
Status: DISCONTINUED | OUTPATIENT
Start: 2024-10-02 | End: 2024-10-02 | Stop reason: HOSPADM

## 2024-10-01 RX ORDER — OXYCODONE HYDROCHLORIDE 10 MG/1
10 TABLET ORAL EVERY 4 HOURS PRN
Status: DISCONTINUED | OUTPATIENT
Start: 2024-10-01 | End: 2024-10-02 | Stop reason: HOSPADM

## 2024-10-01 RX ORDER — ACETAMINOPHEN 325 MG/1
650 TABLET ORAL EVERY 6 HOURS
Status: DISCONTINUED | OUTPATIENT
Start: 2024-10-01 | End: 2024-10-02 | Stop reason: HOSPADM

## 2024-10-01 RX ORDER — FENTANYL CITRATE 50 UG/ML
INJECTION, SOLUTION INTRAMUSCULAR; INTRAVENOUS AS NEEDED
Status: DISCONTINUED | OUTPATIENT
Start: 2024-10-01 | End: 2024-10-01

## 2024-10-01 RX ORDER — HYDROMORPHONE HYDROCHLORIDE 2 MG/ML
INJECTION, SOLUTION INTRAMUSCULAR; INTRAVENOUS; SUBCUTANEOUS AS NEEDED
Status: DISCONTINUED | OUTPATIENT
Start: 2024-10-01 | End: 2024-10-01

## 2024-10-01 RX ORDER — CELECOXIB 400 MG/1
400 CAPSULE ORAL ONCE
Status: COMPLETED | OUTPATIENT
Start: 2024-10-01 | End: 2024-10-01

## 2024-10-01 RX ORDER — ALBUTEROL SULFATE 0.83 MG/ML
2.5 SOLUTION RESPIRATORY (INHALATION) ONCE AS NEEDED
Status: DISCONTINUED | OUTPATIENT
Start: 2024-10-01 | End: 2024-10-01

## 2024-10-01 RX ORDER — NALOXONE HYDROCHLORIDE 0.4 MG/ML
0.2 INJECTION, SOLUTION INTRAMUSCULAR; INTRAVENOUS; SUBCUTANEOUS EVERY 5 MIN PRN
Status: DISCONTINUED | OUTPATIENT
Start: 2024-10-01 | End: 2024-10-02 | Stop reason: HOSPADM

## 2024-10-01 RX ORDER — PROCHLORPERAZINE EDISYLATE 5 MG/ML
10 INJECTION INTRAMUSCULAR; INTRAVENOUS EVERY 6 HOURS PRN
Status: DISCONTINUED | OUTPATIENT
Start: 2024-10-01 | End: 2024-10-02 | Stop reason: HOSPADM

## 2024-10-01 RX ORDER — APREPITANT 40 MG/1
40 CAPSULE ORAL ONCE
Status: COMPLETED | OUTPATIENT
Start: 2024-10-01 | End: 2024-10-01

## 2024-10-01 RX ORDER — PHENYLEPHRINE HCL IN 0.9% NACL 0.4MG/10ML
SYRINGE (ML) INTRAVENOUS AS NEEDED
Status: DISCONTINUED | OUTPATIENT
Start: 2024-10-01 | End: 2024-10-01

## 2024-10-01 RX ORDER — CEFAZOLIN SODIUM 2 G/100ML
2 INJECTION, SOLUTION INTRAVENOUS ONCE
Status: COMPLETED | OUTPATIENT
Start: 2024-10-01 | End: 2024-10-01

## 2024-10-01 RX ORDER — ESOMEPRAZOLE MAGNESIUM 40 MG/1
40 GRANULE, DELAYED RELEASE ORAL
Status: DISCONTINUED | OUTPATIENT
Start: 2024-10-02 | End: 2024-10-02 | Stop reason: HOSPADM

## 2024-10-01 RX ORDER — SIMETHICONE 80 MG
80 TABLET,CHEWABLE ORAL EVERY 4 HOURS PRN
Status: DISCONTINUED | OUTPATIENT
Start: 2024-10-01 | End: 2024-10-02 | Stop reason: HOSPADM

## 2024-10-01 RX ORDER — ONDANSETRON HYDROCHLORIDE 2 MG/ML
INJECTION, SOLUTION INTRAVENOUS AS NEEDED
Status: DISCONTINUED | OUTPATIENT
Start: 2024-10-01 | End: 2024-10-01

## 2024-10-01 RX ORDER — PROCHLORPERAZINE MALEATE 5 MG
10 TABLET ORAL EVERY 6 HOURS PRN
Status: DISCONTINUED | OUTPATIENT
Start: 2024-10-01 | End: 2024-10-02 | Stop reason: HOSPADM

## 2024-10-01 RX ORDER — SODIUM CHLORIDE, SODIUM LACTATE, POTASSIUM CHLORIDE, CALCIUM CHLORIDE 600; 310; 30; 20 MG/100ML; MG/100ML; MG/100ML; MG/100ML
125 INJECTION, SOLUTION INTRAVENOUS CONTINUOUS
Status: DISCONTINUED | OUTPATIENT
Start: 2024-10-01 | End: 2024-10-01

## 2024-10-01 RX ORDER — GLYCOPYRROLATE 0.2 MG/ML
INJECTION INTRAMUSCULAR; INTRAVENOUS AS NEEDED
Status: DISCONTINUED | OUTPATIENT
Start: 2024-10-01 | End: 2024-10-01

## 2024-10-01 RX ORDER — ONDANSETRON HYDROCHLORIDE 2 MG/ML
8 INJECTION, SOLUTION INTRAVENOUS ONCE
Status: DISCONTINUED | OUTPATIENT
Start: 2024-10-01 | End: 2024-10-01

## 2024-10-01 RX ORDER — PROCHLORPERAZINE 25 MG/1
25 SUPPOSITORY RECTAL EVERY 12 HOURS PRN
Status: DISCONTINUED | OUTPATIENT
Start: 2024-10-01 | End: 2024-10-02 | Stop reason: HOSPADM

## 2024-10-01 RX ORDER — NORETHINDRONE AND ETHINYL ESTRADIOL 0.5-0.035
KIT ORAL AS NEEDED
Status: DISCONTINUED | OUTPATIENT
Start: 2024-10-01 | End: 2024-10-01

## 2024-10-01 RX ORDER — PROPOFOL 10 MG/ML
INJECTION, EMULSION INTRAVENOUS AS NEEDED
Status: DISCONTINUED | OUTPATIENT
Start: 2024-10-01 | End: 2024-10-01

## 2024-10-01 RX ORDER — ONDANSETRON HYDROCHLORIDE 2 MG/ML
4 INJECTION, SOLUTION INTRAVENOUS EVERY 6 HOURS
Status: DISCONTINUED | OUTPATIENT
Start: 2024-10-01 | End: 2024-10-02 | Stop reason: HOSPADM

## 2024-10-01 RX ORDER — LEVOTHYROXINE SODIUM 112 UG/1
112 TABLET ORAL DAILY
Status: DISCONTINUED | OUTPATIENT
Start: 2024-10-02 | End: 2024-10-02 | Stop reason: HOSPADM

## 2024-10-01 RX ORDER — HYDRALAZINE HYDROCHLORIDE 20 MG/ML
10 INJECTION INTRAMUSCULAR; INTRAVENOUS EVERY 4 HOURS PRN
Status: DISCONTINUED | OUTPATIENT
Start: 2024-10-01 | End: 2024-10-02 | Stop reason: HOSPADM

## 2024-10-01 RX ORDER — ONDANSETRON 4 MG/1
4 TABLET, ORALLY DISINTEGRATING ORAL EVERY 6 HOURS
Status: DISCONTINUED | OUTPATIENT
Start: 2024-10-01 | End: 2024-10-02 | Stop reason: HOSPADM

## 2024-10-01 RX ORDER — HEPARIN SODIUM 5000 [USP'U]/ML
5000 INJECTION, SOLUTION INTRAVENOUS; SUBCUTANEOUS ONCE
Status: COMPLETED | OUTPATIENT
Start: 2024-10-01 | End: 2024-10-01

## 2024-10-01 RX ORDER — SODIUM CHLORIDE, SODIUM LACTATE, POTASSIUM CHLORIDE, CALCIUM CHLORIDE 600; 310; 30; 20 MG/100ML; MG/100ML; MG/100ML; MG/100ML
100 INJECTION, SOLUTION INTRAVENOUS CONTINUOUS
Status: DISCONTINUED | OUTPATIENT
Start: 2024-10-01 | End: 2024-10-01

## 2024-10-01 RX ORDER — ACETAMINOPHEN 325 MG/1
650 TABLET ORAL ONCE
Status: COMPLETED | OUTPATIENT
Start: 2024-10-01 | End: 2024-10-01

## 2024-10-01 RX ORDER — MIDAZOLAM HYDROCHLORIDE 1 MG/ML
INJECTION INTRAMUSCULAR; INTRAVENOUS AS NEEDED
Status: DISCONTINUED | OUTPATIENT
Start: 2024-10-01 | End: 2024-10-01

## 2024-10-01 RX ORDER — OXYCODONE HYDROCHLORIDE 5 MG/1
5 TABLET ORAL EVERY 4 HOURS PRN
Status: DISCONTINUED | OUTPATIENT
Start: 2024-10-01 | End: 2024-10-02 | Stop reason: HOSPADM

## 2024-10-01 RX ORDER — SCOLOPAMINE TRANSDERMAL SYSTEM 1 MG/1
1 PATCH, EXTENDED RELEASE TRANSDERMAL ONCE
Status: DISCONTINUED | OUTPATIENT
Start: 2024-10-01 | End: 2024-10-01

## 2024-10-01 RX ORDER — KETOROLAC TROMETHAMINE 30 MG/ML
INJECTION, SOLUTION INTRAMUSCULAR; INTRAVENOUS AS NEEDED
Status: DISCONTINUED | OUTPATIENT
Start: 2024-10-01 | End: 2024-10-01

## 2024-10-01 RX ORDER — ENOXAPARIN SODIUM 100 MG/ML
40 INJECTION SUBCUTANEOUS EVERY 12 HOURS
Status: DISCONTINUED | OUTPATIENT
Start: 2024-10-01 | End: 2024-10-02 | Stop reason: HOSPADM

## 2024-10-01 RX ORDER — GABAPENTIN 300 MG/1
600 CAPSULE ORAL ONCE
Status: COMPLETED | OUTPATIENT
Start: 2024-10-01 | End: 2024-10-01

## 2024-10-01 RX ORDER — SODIUM CHLORIDE AND POTASSIUM CHLORIDE 150; 900 MG/100ML; MG/100ML
75 INJECTION, SOLUTION INTRAVENOUS CONTINUOUS
Status: DISCONTINUED | OUTPATIENT
Start: 2024-10-01 | End: 2024-10-02 | Stop reason: HOSPADM

## 2024-10-01 RX ORDER — ROCURONIUM BROMIDE 10 MG/ML
INJECTION, SOLUTION INTRAVENOUS AS NEEDED
Status: DISCONTINUED | OUTPATIENT
Start: 2024-10-01 | End: 2024-10-01

## 2024-10-01 RX ORDER — LOSARTAN POTASSIUM 50 MG/1
25 TABLET ORAL DAILY
Status: DISCONTINUED | OUTPATIENT
Start: 2024-10-02 | End: 2024-10-02 | Stop reason: HOSPADM

## 2024-10-01 RX ORDER — LIDOCAINE HCL/PF 100 MG/5ML
SYRINGE (ML) INTRAVENOUS AS NEEDED
Status: DISCONTINUED | OUTPATIENT
Start: 2024-10-01 | End: 2024-10-01

## 2024-10-01 RX ORDER — SUMATRIPTAN 50 MG/1
50 TABLET, FILM COATED ORAL ONCE AS NEEDED
Status: DISCONTINUED | OUTPATIENT
Start: 2024-10-01 | End: 2024-10-02 | Stop reason: HOSPADM

## 2024-10-01 RX ORDER — ACETAMINOPHEN 325 MG/1
650 TABLET ORAL EVERY 4 HOURS PRN
Status: DISCONTINUED | OUTPATIENT
Start: 2024-10-01 | End: 2024-10-01

## 2024-10-01 RX ORDER — PANTOPRAZOLE SODIUM 40 MG/1
40 TABLET, DELAYED RELEASE ORAL
Status: DISCONTINUED | OUTPATIENT
Start: 2024-10-02 | End: 2024-10-02 | Stop reason: HOSPADM

## 2024-10-01 RX ORDER — KETOROLAC TROMETHAMINE 15 MG/ML
15 INJECTION, SOLUTION INTRAMUSCULAR; INTRAVENOUS EVERY 6 HOURS SCHEDULED
Status: DISCONTINUED | OUTPATIENT
Start: 2024-10-01 | End: 2024-10-02 | Stop reason: HOSPADM

## 2024-10-01 SDOH — SOCIAL STABILITY: SOCIAL INSECURITY
WITHIN THE LAST YEAR, HAVE YOU BEEN KICKED, HIT, SLAPPED, OR OTHERWISE PHYSICALLY HURT BY YOUR PARTNER OR EX-PARTNER?: NO

## 2024-10-01 SDOH — SOCIAL STABILITY: SOCIAL INSECURITY: WITHIN THE LAST YEAR, HAVE YOU BEEN AFRAID OF YOUR PARTNER OR EX-PARTNER?: NO

## 2024-10-01 SDOH — SOCIAL STABILITY: SOCIAL INSECURITY
WITHIN THE LAST YEAR, HAVE TO BEEN RAPED OR FORCED TO HAVE ANY KIND OF SEXUAL ACTIVITY BY YOUR PARTNER OR EX-PARTNER?: NO

## 2024-10-01 SDOH — ECONOMIC STABILITY: HOUSING INSECURITY: IN THE PAST 12 MONTHS, HOW MANY TIMES HAVE YOU MOVED WHERE YOU WERE LIVING?: 1

## 2024-10-01 SDOH — ECONOMIC STABILITY: INCOME INSECURITY: IN THE LAST 12 MONTHS, WAS THERE A TIME WHEN YOU WERE NOT ABLE TO PAY THE MORTGAGE OR RENT ON TIME?: NO

## 2024-10-01 SDOH — ECONOMIC STABILITY: FOOD INSECURITY: WITHIN THE PAST 12 MONTHS, YOU WORRIED THAT YOUR FOOD WOULD RUN OUT BEFORE YOU GOT MONEY TO BUY MORE.: NEVER TRUE

## 2024-10-01 SDOH — ECONOMIC STABILITY: HOUSING INSECURITY: AT ANY TIME IN THE PAST 12 MONTHS, WERE YOU HOMELESS OR LIVING IN A SHELTER (INCLUDING NOW)?: NO

## 2024-10-01 SDOH — SOCIAL STABILITY: SOCIAL INSECURITY: WITHIN THE LAST YEAR, HAVE YOU BEEN HUMILIATED OR EMOTIONALLY ABUSED IN OTHER WAYS BY YOUR PARTNER OR EX-PARTNER?: NO

## 2024-10-01 SDOH — SOCIAL STABILITY: SOCIAL INSECURITY: DO YOU FEEL ANYONE HAS EXPLOITED OR TAKEN ADVANTAGE OF YOU FINANCIALLY OR OF YOUR PERSONAL PROPERTY?: NO

## 2024-10-01 SDOH — ECONOMIC STABILITY: INCOME INSECURITY: IN THE PAST 12 MONTHS, HAS THE ELECTRIC, GAS, OIL, OR WATER COMPANY THREATENED TO SHUT OFF SERVICE IN YOUR HOME?: NO

## 2024-10-01 SDOH — HEALTH STABILITY: MENTAL HEALTH: CURRENT SMOKER: 0

## 2024-10-01 SDOH — SOCIAL STABILITY: SOCIAL INSECURITY: DO YOU FEEL UNSAFE GOING BACK TO THE PLACE WHERE YOU ARE LIVING?: NO

## 2024-10-01 SDOH — ECONOMIC STABILITY: FOOD INSECURITY: WITHIN THE PAST 12 MONTHS, THE FOOD YOU BOUGHT JUST DIDN'T LAST AND YOU DIDN'T HAVE MONEY TO GET MORE.: NEVER TRUE

## 2024-10-01 SDOH — SOCIAL STABILITY: SOCIAL INSECURITY: HAVE YOU HAD ANY THOUGHTS OF HARMING ANYONE ELSE?: NO

## 2024-10-01 SDOH — SOCIAL STABILITY: SOCIAL INSECURITY: HAS ANYONE EVER THREATENED TO HURT YOUR FAMILY OR YOUR PETS?: NO

## 2024-10-01 SDOH — ECONOMIC STABILITY: TRANSPORTATION INSECURITY
IN THE PAST 12 MONTHS, HAS LACK OF TRANSPORTATION KEPT YOU FROM MEETINGS, WORK, OR FROM GETTING THINGS NEEDED FOR DAILY LIVING?: NO

## 2024-10-01 SDOH — ECONOMIC STABILITY: INCOME INSECURITY: HOW HARD IS IT FOR YOU TO PAY FOR THE VERY BASICS LIKE FOOD, HOUSING, MEDICAL CARE, AND HEATING?: NOT VERY HARD

## 2024-10-01 SDOH — SOCIAL STABILITY: SOCIAL INSECURITY: DOES ANYONE TRY TO KEEP YOU FROM HAVING/CONTACTING OTHER FRIENDS OR DOING THINGS OUTSIDE YOUR HOME?: NO

## 2024-10-01 SDOH — SOCIAL STABILITY: SOCIAL INSECURITY: WERE YOU ABLE TO COMPLETE ALL THE BEHAVIORAL HEALTH SCREENINGS?: YES

## 2024-10-01 SDOH — SOCIAL STABILITY: SOCIAL INSECURITY: ABUSE: ADULT

## 2024-10-01 SDOH — SOCIAL STABILITY: SOCIAL INSECURITY: HAVE YOU HAD THOUGHTS OF HARMING ANYONE ELSE?: NO

## 2024-10-01 SDOH — ECONOMIC STABILITY: TRANSPORTATION INSECURITY
IN THE PAST 12 MONTHS, HAS THE LACK OF TRANSPORTATION KEPT YOU FROM MEDICAL APPOINTMENTS OR FROM GETTING MEDICATIONS?: NO

## 2024-10-01 SDOH — SOCIAL STABILITY: SOCIAL INSECURITY: ARE THERE ANY APPARENT SIGNS OF INJURIES/BEHAVIORS THAT COULD BE RELATED TO ABUSE/NEGLECT?: NO

## 2024-10-01 SDOH — SOCIAL STABILITY: SOCIAL INSECURITY: ARE YOU OR HAVE YOU BEEN THREATENED OR ABUSED PHYSICALLY, EMOTIONALLY, OR SEXUALLY BY ANYONE?: NO

## 2024-10-01 ASSESSMENT — COGNITIVE AND FUNCTIONAL STATUS - GENERAL
MOBILITY SCORE: 24
PATIENT BASELINE BEDBOUND: NO
MOBILITY SCORE: 24
DAILY ACTIVITIY SCORE: 24
DAILY ACTIVITIY SCORE: 24

## 2024-10-01 ASSESSMENT — ACTIVITIES OF DAILY LIVING (ADL)
HEARING - RIGHT EAR: FUNCTIONAL
LACK_OF_TRANSPORTATION: NO
FEEDING YOURSELF: NEEDS ASSISTANCE
ADEQUATE_TO_COMPLETE_ADL: YES
TOILETING: NEEDS ASSISTANCE
HEARING - LEFT EAR: FUNCTIONAL
BATHING: NEEDS ASSISTANCE
JUDGMENT_ADEQUATE_SAFELY_COMPLETE_DAILY_ACTIVITIES: YES
WALKS IN HOME: NEEDS ASSISTANCE
PATIENT'S MEMORY ADEQUATE TO SAFELY COMPLETE DAILY ACTIVITIES?: YES
GROOMING: NEEDS ASSISTANCE
DRESSING YOURSELF: NEEDS ASSISTANCE

## 2024-10-01 ASSESSMENT — LIFESTYLE VARIABLES
HOW OFTEN DO YOU HAVE 6 OR MORE DRINKS ON ONE OCCASION: NEVER
SKIP TO QUESTIONS 9-10: 1
HOW OFTEN DO YOU HAVE A DRINK CONTAINING ALCOHOL: NEVER
AUDIT-C TOTAL SCORE: 0
AUDIT-C TOTAL SCORE: 0
HOW MANY STANDARD DRINKS CONTAINING ALCOHOL DO YOU HAVE ON A TYPICAL DAY: PATIENT DOES NOT DRINK

## 2024-10-01 ASSESSMENT — PATIENT HEALTH QUESTIONNAIRE - PHQ9
SUM OF ALL RESPONSES TO PHQ9 QUESTIONS 1 & 2: 0
1. LITTLE INTEREST OR PLEASURE IN DOING THINGS: NOT AT ALL
2. FEELING DOWN, DEPRESSED OR HOPELESS: NOT AT ALL

## 2024-10-01 ASSESSMENT — PAIN SCALES - GENERAL
PAINLEVEL_OUTOF10: 7
PAINLEVEL_OUTOF10: 4
PAINLEVEL_OUTOF10: 8
PAINLEVEL_OUTOF10: 7
PAIN_LEVEL: 1

## 2024-10-01 ASSESSMENT — PAIN DESCRIPTION - LOCATION
LOCATION: ABDOMEN

## 2024-10-01 ASSESSMENT — COLUMBIA-SUICIDE SEVERITY RATING SCALE - C-SSRS
6. HAVE YOU EVER DONE ANYTHING, STARTED TO DO ANYTHING, OR PREPARED TO DO ANYTHING TO END YOUR LIFE?: NO
2. HAVE YOU ACTUALLY HAD ANY THOUGHTS OF KILLING YOURSELF?: NO
1. IN THE PAST MONTH, HAVE YOU WISHED YOU WERE DEAD OR WISHED YOU COULD GO TO SLEEP AND NOT WAKE UP?: NO

## 2024-10-01 ASSESSMENT — PAIN - FUNCTIONAL ASSESSMENT: PAIN_FUNCTIONAL_ASSESSMENT: 0-10

## 2024-10-01 NOTE — ANESTHESIA PREPROCEDURE EVALUATION
Patient: Tiffanie Burdick    Procedure Information       Date/Time: 10/01/24 0735    Procedure: GASTRIC SLEEVE LAPAROSCOPIC    Location: [facility] OR 07 / [facility]    Surgeons: Zachary Kwok MD            Relevant Problems   Anesthesia   (+) PONV (postoperative nausea and vomiting)      Cardiac   (+) Chest pain   (+) Pain of left breast   (+) Primary hypertension   (+) Rib pain      Pulmonary   (+) Obstructive sleep apnea (adult) (pediatric)      Neuro   (+) Anxiety   (+) Daily headache   (+) Depression with anxiety      Endocrine   (+) Hypothyroidism   (+) Hypothyroidism, adult   (+) Morbid obesity (Multi)   (+) Morbid obesity due to excess calories (Multi)      Hematology   (+) Factor V Leiden (Multi)   (+) Factor V Leiden mutation (Multi)      ID   (+) Upper respiratory tract infection      Skin   (+) Rash and other nonspecific skin eruption       Clinical information reviewed:   Tobacco  Allergies  Meds   Med Hx  Surg Hx   Fam Hx  Soc Hx        NPO Detail:  NPO/Void Status  Carbohydrate Drink Given Prior to Surgery? : N  Date of Last Liquid: 10/01/24  Time of Last Liquid: 0500  Date of Last Solid: 09/30/24  Time of Last Solid: 2300  Last Intake Type: Clear fluids  Time of Last Void: 0640         Physical Exam    Airway  Mallampati: I  TM distance: >3 FB  Neck ROM: full     Cardiovascular - normal exam  Rate: normal     Dental - normal exam     Pulmonary - normal exam  Breath sounds clear to auscultation     Abdominal            Anesthesia Plan    History of general anesthesia?: yes  History of complications of general anesthesia?: no    ASA 3     general     The patient is not a current smoker.  Patient was not previously instructed to abstain from smoking on day of procedure.    intravenous induction   Anesthetic plan and risks discussed with patient.  Use of blood products discussed with patient who.    Plan discussed with CRNA.

## 2024-10-01 NOTE — BRIEF OP NOTE
Date: 10/1/2024  OR Location: [facility] OR    Name: Tiffanie Burdick, : 1985, Age: 39 y.o., MRN: [id number], Sex: female    Diagnosis  Pre-op Diagnosis      * Morbid obesity (Multi) [E66.01] Post-op Diagnosis     * Morbid obesity (Multi) [E66.01]     Procedures  GASTRIC SLEEVE LAPAROSCOPIC  16883 - LA LAPS GSTRC RSTRICTIV PX LONGITUDINAL GASTRECTOMY    EGD  TAP Block    Surgeons      * Zachary Kwok - Primary    Resident/Fellow/Other Assistant:  Surgeons and Role:  * No surgeons found with a matching role *    Procedure Summary  Anesthesia: General  ASA: III  Anesthesia Staff: Anesthesiologist: Amrita Eubanks DO  CRNA: LOS Koenig-CRNA  Estimated Blood Loss: 5 mL  Intra-op Medications:   Administrations occurring from 0735 to 1005 on 10/01/24:   Medication Name Total Dose   BUPivacaine HCl (Marcaine) 0.5 % (5 mg/mL) 30 mL, bupivacaine PF (Marcaine) 0.25 % (2.5 mg/mL) 30 mL, dexAMETHasone (Decadron) 4 mg in sodium chloride 0.9% 100 mL syringe Cannot be calculated   lactated Ringer's infusion 608.33 mL   ceFAZolin (Ancef) 2 g in dextrose (iso)  mL 2 g              Anesthesia Record               Intraprocedure I/O Totals          Intake    lactated Ringer's infusion 1700.00 mL    Total Intake 1700 mL       Output    Est. Blood Loss 5 mL    Total Output 5 mL       Net    Net Volume 1695 mL          Specimen:   ID Type Source Tests Collected by Time   1 : GREATER CURVATURE OF STOMACH Tissue STOMACH SLEEVE RESECTION SURGICAL PATHOLOGY EXAM Zachary Kwok MD 10/1/2024 0848        Staff:   Circulator: Ruthy Manzanoub Person: Narda  Scrub Person: Sunita LOVEA: Zack  Circulator: Liang          Findings: normal anatomy    Complications:  None; patient tolerated the procedure well.     Disposition: PACU - hemodynamically stable.  Condition: stable  Specimens Collected:   ID Type Source Tests Collected by Time   1 : GREATER CURVATURE OF STOMACH Tissue STOMACH SLEEVE RESECTION SURGICAL PATHOLOGY EXAM Zachary  WENDY Kwok MD 10/1/2024 0848     Attending Attestation: I was present and scrubbed for the entire procedure.    Zachary Kwok  Phone Number: [phone]

## 2024-10-01 NOTE — ANESTHESIA POSTPROCEDURE EVALUATION
Patient: Tiffanie Burdick    Procedure Summary       Date: 10/01/24 Room / Location: [facility] OR [facility] / [facility]    Anesthesia Start: 0807 Anesthesia Stop: 0941    Procedure: GASTRIC SLEEVE LAPAROSCOPIC Diagnosis:       Morbid obesity (Multi)      (Morbid obesity (Multi) [E66.01])    Surgeons: Zachary Kwok MD Responsible Provider: Amrita Eubanks DO    Anesthesia Type: general ASA Status: 3            Anesthesia Type: general    Vitals Value Taken Time   /80 10/01/24 0951   Temp 36.4 °C (97.5 °F) 10/01/24 0936   Pulse 89 10/01/24 0951   Resp 15 10/01/24 0951   SpO2 98 % 10/01/24 0951       Anesthesia Post Evaluation    Patient location during evaluation: PACU  Patient participation: complete - patient participated  Level of consciousness: awake  Pain score: 1  Pain management: adequate  Airway patency: patent  Cardiovascular status: acceptable  Respiratory status: acceptable  Hydration status: acceptable  Postoperative Nausea and Vomiting: none        No notable events documented.

## 2024-10-01 NOTE — CARE PLAN
The patient's goals for the shift include      The clinical goals for the shift include 2 clears an hour, mobility, helping relief gas pain/discomfort      Problem: HP General Problem  Goal: HP General Goal  Outcome: Progressing

## 2024-10-01 NOTE — ANESTHESIA PROCEDURE NOTES
Airway  Date/Time: 10/1/2024 8:33 AM  Urgency: elective    Airway not difficult    Staffing  Performed: CRNA   Authorized by: Amrita Eubanks DO    Performed by: LOS Koenig-JAMIL  Patient location during procedure: OR    Indications and Patient Condition  Indications for airway management: anesthesia  Spontaneous Ventilation: absent  Sedation level: deep  Preoxygenated: yes  Patient position: sniffing  Mask difficulty assessment: 1 - vent by mask    Final Airway Details  Final airway type: endotracheal airway      Successful airway: ETT  Cuffed: yes   Successful intubation technique: video laryngoscopy  Facilitating devices/methods: intubating stylet  Endotracheal tube insertion site: oral  Blade: Alisha  Blade size: #3  ETT size (mm): 7.5  Cormack-Lehane Classification: grade I - full view of glottis  Placement verified by: chest auscultation and capnometry   Measured from: lips  ETT to lips (cm): 22  Number of attempts at approach: 1  Number of other approaches attempted: 0    Additional Comments  Dentition same as preop after laryngoscopy with [place] and intubation.

## 2024-10-01 NOTE — ADDENDUM NOTE
Addendum  created 10/01/24 1042 by NIKKI Koenig    Intraprocedure Meds edited, Orders acknowledged in Narrator

## 2024-10-01 NOTE — PROGRESS NOTES
10/01/24 1406   Discharge Planning   Living Arrangements Spouse/significant other;Children   Support Systems Spouse/significant other;Children   Assistance Needed A&Ox3, independent with ADLs, no DME, room air at baseline, drives   Type of Residence Private residence   Home or Post Acute Services None   Expected Discharge Disposition Home   Does the patient need discharge transport arranged? No

## 2024-10-01 NOTE — OP NOTE
DATE PERFORMED:  10/01/24     SURGEON:  Zachary Kwok MD    ASSISTANT: Zack     PREOPERATIVE DIAGNOSES:  Morbid obesity, BMI 39.4, hypothyroidism, migraines, hypertension, anxiety, HOSSEIN, Factor 5 Leiden mutation.      POSTOPERATIVE DIAGNOSES:  Morbid obesity, BMI 39.4, hypothyroidism, migraines, hypertension, anxiety, HOSSEIN, Factor 5 Leiden mutation.     PROCEDURES PERFORMED:  1. Laparoscopic sleeve gastrectomy, 2. intraoperative  endoscopy, 3. transversus abdominis plane block.     COMPLICATIONS:  None.     ESTIMATED BLOOD LOSS:  5 ml     DRAINS:  None.     ANESTHESIA:  General endotracheal.     SPECIMENS:  Greater curvature of the stomach.     BRIEF HISTORY:   Tiffanie Burdick is a 39 y.o. yo female, morbidly obese with  a BMI of 39.6.   she has obesity-related comorbidities including  hypothyroidism, migraines, hypertension, anxiety, HOSSEIN, Factor 5 Leiden mutation.  she has attempted and failed multiple diet and exercise  regimens in the past.   she was thoroughly evaluated in a  multidisciplinary fashion and found to be an appropriate candidate for laparoscopic sleeve  gastrectomy.   she was informed of the risks and benefits of the  procedure and expressed understanding and consent.     PROCEDURE IN DETAIL:  On the day of the operation, Tiffanie Burdick was  properly identified in the holding area.   The patient was taken to the operating  room, placed in a supine position on the operating room table.   An OR huddle was performed. she was  given appropriate perioperative IV antibiotics and heparin subcu.  SCDs  were placed.   Anesthesia was induced and the patient was intubation.  An appropriate OR  time-out was performed.   The patient was prepped and draped in normal sterile  fashion.  A nick was made in the left upper quadrant and a Veress needle  was inserted.  The abdomen was insufflated with CO2 and a 5 mm Optiview  trocar was inserted under direct visualization just to the right and  above the umbilicus.   The upper abdomen was surveilled and no scar  tissue was found.  A bilateral transversus abdominis plane block was  performed with a mixture of marcaine, decadron, and saline by myself and the assistant.  This  mixture was also used to numb the locations of our trocar placements.  The following trocars were then placed under direct visualization with  the laparoscope by myself and the assistant.  A 5 mm trocar was placed in the left upper quadrant  anterior axillary line.  A 5 mm trocar was placed in the right upper  quadrant anterior axillary line.  A 5 mm trocar was placed in the right  upper quadrant midclavicular line subcostally and finally, a 15 mm trocar  was placed in the right upper quadrant midclavicular line above the  level of the umbilicus.  The camera was controlled by the assistant for the entire case. We then placed the patient in reverse  Trendelenburg position and a snake liver retractor was inserted and  retracted the left lobe of the liver away from the hiatus.  We  began the procedure by grasping the greater curvature of the stomach at  the level of the incisura and created a defect in the gastrocolic  ligament with the Harmonic Scalpel.  We then proceeded to excise the  omentum off the greater curvature of the stomach using the Harmonic  Scalpel, moving proximally until we reached the angle of His. The assistant provided critical retraction during this portion of the procedure. We were  careful to dissect away any posterior attachments both to the floppy  fundus and we used ahsan on some thin attachments on the posterior body of the stomach. The assistant mobilized the distal portion of the  greater curvature to a level 5 cm proximal to the pylorus.  We then placed a  green load Endo stapler with a SeamGuard taking a small bite from the  greater curvature of the stomach to begin creating our sleeve.  With the help of the assistant,   the stomach was tented up and anesthesia placed a 36-Ivorian  Visigi  along the lesser curvature of the stomach.  The bougie was used as a  sizer and we proceeded to fire several more loads of the blue load Endo  stapler with a SeamGuard moving proximally, hugging the bougie, to create  a tight sleeve.  Our final load was a white load of the stapler,  completely transecting off the gastric fundus.  The assistant provided necessary retraction during this process. We placed several clips  on the distal staple line for hemostasis.  We then placed a bowel clamp  across the distal stomach and the assistant instilled sterile saline into the upper  quadrant around our new sleeve.  The endoscope was inserted through the  oropharynx into the sleeve.  We were able to  visualize a straight staple line with no bleeding.  Air was instilled  into the stomach and submerged underneath the sterile saline in the  abdominal cavity.  There were no bubbles and thus the leak test was  negative.  The assistant then suctioned the air out of the stomach, removed the  endoscope and suctioned the saline out of the abdominal cavity and  removed the bowel clamp.   We  tacked the distal portion of the sleeve to the hanging gastrocolic  ligament to prevent curling with a 2-0 Strattafix suture.  We removed the gastric remnant through the  15 mm trocar site.  The liver retractor was removed. Satisfied with our hemostasis throughout the  abdomen, the assistant helped to close the 15 mm trocar site with 0 Vicryl  sutures using the Narayan-Ruslan device.  We then desufflated the  abdomen of CO2, removed the camera and the trocars.  The 15 mm trocar  site was copiously irrigated with sterile saline and then all the skin  incisions were closed with 4-0 Monocryl subcuticular sutures and  Dermabond by the assistant.  The patient tolerated the procedure without difficulty, was  extubated immediately postoperatively and was transferred to the PACU in  good condition.    This operation could not have been safely  performed (without compromising the technical results or length of the procedure) without the assistance of a skilled surgical assistant. A surgical assistant was medically necessary for positioning, retraction and instrumentation.      Zachary Kwok MD

## 2024-10-02 ENCOUNTER — APPOINTMENT (OUTPATIENT)
Dept: RADIOLOGY | Facility: HOSPITAL | Age: 39
End: 2024-10-02
Payer: COMMERCIAL

## 2024-10-02 ENCOUNTER — PHARMACY VISIT (OUTPATIENT)
Dept: PHARMACY | Facility: CLINIC | Age: 39
End: 2024-10-02
Payer: MEDICARE

## 2024-10-02 VITALS
TEMPERATURE: 97.5 F | HEIGHT: 67 IN | OXYGEN SATURATION: 97 % | SYSTOLIC BLOOD PRESSURE: 119 MMHG | RESPIRATION RATE: 16 BRPM | DIASTOLIC BLOOD PRESSURE: 76 MMHG | WEIGHT: 254.41 LBS | BODY MASS INDEX: 39.93 KG/M2 | HEART RATE: 66 BPM

## 2024-10-02 LAB
ALBUMIN SERPL BCP-MCNC: 3.9 G/DL (ref 3.4–5)
ALP SERPL-CCNC: 33 U/L (ref 33–110)
ALT SERPL W P-5'-P-CCNC: 33 U/L (ref 7–45)
ANION GAP SERPL CALC-SCNC: 10 MMOL/L (ref 10–20)
AST SERPL W P-5'-P-CCNC: 21 U/L (ref 9–39)
BASOPHILS # BLD AUTO: 0.01 X10*3/UL (ref 0–0.1)
BASOPHILS NFR BLD AUTO: 0.1 %
BILIRUB SERPL-MCNC: 0.5 MG/DL (ref 0–1.2)
BUN SERPL-MCNC: 8 MG/DL (ref 6–23)
CALCIUM SERPL-MCNC: 8.2 MG/DL (ref 8.6–10.3)
CHLORIDE SERPL-SCNC: 107 MMOL/L (ref 98–107)
CO2 SERPL-SCNC: 22 MMOL/L (ref 21–32)
CREAT SERPL-MCNC: 0.7 MG/DL (ref 0.5–1.05)
EGFRCR SERPLBLD CKD-EPI 2021: >90 ML/MIN/1.73M*2
EOSINOPHIL # BLD AUTO: 0 X10*3/UL (ref 0–0.7)
EOSINOPHIL NFR BLD AUTO: 0 %
ERYTHROCYTE [DISTWIDTH] IN BLOOD BY AUTOMATED COUNT: 12.2 % (ref 11.5–14.5)
GLUCOSE SERPL-MCNC: 92 MG/DL (ref 74–99)
HCT VFR BLD AUTO: 35.8 % (ref 36–46)
HCT VFR BLD AUTO: 37.5 % (ref 36–46)
HGB BLD-MCNC: 12.4 G/DL (ref 12–16)
HGB BLD-MCNC: 12.8 G/DL (ref 12–16)
IMM GRANULOCYTES # BLD AUTO: 0.03 X10*3/UL (ref 0–0.7)
IMM GRANULOCYTES NFR BLD AUTO: 0.3 % (ref 0–0.9)
LYMPHOCYTES # BLD AUTO: 1.2 X10*3/UL (ref 1.2–4.8)
LYMPHOCYTES NFR BLD AUTO: 11.3 %
MCH RBC QN AUTO: 31.4 PG (ref 26–34)
MCHC RBC AUTO-ENTMCNC: 34.1 G/DL (ref 32–36)
MCV RBC AUTO: 92 FL (ref 80–100)
MONOCYTES # BLD AUTO: 0.74 X10*3/UL (ref 0.1–1)
MONOCYTES NFR BLD AUTO: 7 %
NEUTROPHILS # BLD AUTO: 8.63 X10*3/UL (ref 1.2–7.7)
NEUTROPHILS NFR BLD AUTO: 81.3 %
NRBC BLD-RTO: 0 /100 WBCS (ref 0–0)
PLATELET # BLD AUTO: 279 X10*3/UL (ref 150–450)
POTASSIUM SERPL-SCNC: 4 MMOL/L (ref 3.5–5.3)
PROT SERPL-MCNC: 6.2 G/DL (ref 6.4–8.2)
RBC # BLD AUTO: 4.07 X10*6/UL (ref 4–5.2)
SODIUM SERPL-SCNC: 135 MMOL/L (ref 136–145)
WBC # BLD AUTO: 10.6 X10*3/UL (ref 4.4–11.3)

## 2024-10-02 PROCEDURE — 85014 HEMATOCRIT: CPT | Performed by: SURGERY

## 2024-10-02 PROCEDURE — 85018 HEMOGLOBIN: CPT | Performed by: SURGERY

## 2024-10-02 PROCEDURE — 99024 POSTOP FOLLOW-UP VISIT: CPT | Performed by: SURGERY

## 2024-10-02 PROCEDURE — 74220 X-RAY XM ESOPHAGUS 1CNTRST: CPT

## 2024-10-02 PROCEDURE — 2500000004 HC RX 250 GENERAL PHARMACY W/ HCPCS (ALT 636 FOR OP/ED): Performed by: SURGERY

## 2024-10-02 PROCEDURE — 36415 COLL VENOUS BLD VENIPUNCTURE: CPT | Performed by: SURGERY

## 2024-10-02 PROCEDURE — 2550000001 HC RX 255 CONTRASTS: Performed by: SURGERY

## 2024-10-02 PROCEDURE — 80053 COMPREHEN METABOLIC PANEL: CPT | Performed by: SURGERY

## 2024-10-02 PROCEDURE — 85025 COMPLETE CBC W/AUTO DIFF WBC: CPT | Performed by: SURGERY

## 2024-10-02 PROCEDURE — 2500000001 HC RX 250 WO HCPCS SELF ADMINISTERED DRUGS (ALT 637 FOR MEDICARE OP): Performed by: SURGERY

## 2024-10-02 PROCEDURE — 2500000005 HC RX 250 GENERAL PHARMACY W/O HCPCS: Performed by: SURGERY

## 2024-10-02 PROCEDURE — 74220 X-RAY XM ESOPHAGUS 1CNTRST: CPT | Performed by: STUDENT IN AN ORGANIZED HEALTH CARE EDUCATION/TRAINING PROGRAM

## 2024-10-02 RX ORDER — DIATRIZOATE MEGLUMINE AND DIATRIZOATE SODIUM 660; 100 MG/ML; MG/ML
30 SOLUTION ORAL; RECTAL ONCE
Status: DISCONTINUED | OUTPATIENT
Start: 2024-10-02 | End: 2024-10-02

## 2024-10-02 RX ORDER — ACETAMINOPHEN 325 MG/1
650 TABLET ORAL EVERY 6 HOURS
Start: 2024-10-02

## 2024-10-02 RX ORDER — SIMETHICONE 80 MG
80 TABLET,CHEWABLE ORAL EVERY 4 HOURS PRN
Start: 2024-10-02

## 2024-10-02 RX ORDER — DIATRIZOATE MEGLUMINE AND DIATRIZOATE SODIUM 660; 100 MG/ML; MG/ML
60 SOLUTION ORAL; RECTAL ONCE
Status: COMPLETED | OUTPATIENT
Start: 2024-10-02 | End: 2024-10-02

## 2024-10-02 ASSESSMENT — PAIN DESCRIPTION - ORIENTATION: ORIENTATION: RIGHT;LEFT

## 2024-10-02 ASSESSMENT — PAIN SCALES - GENERAL
PAINLEVEL_OUTOF10: 6
PAINLEVEL_OUTOF10: 4
PAINLEVEL_OUTOF10: 7
PAINLEVEL_OUTOF10: 6
PAINLEVEL_OUTOF10: 4

## 2024-10-02 ASSESSMENT — PAIN - FUNCTIONAL ASSESSMENT
PAIN_FUNCTIONAL_ASSESSMENT: 0-10
PAIN_FUNCTIONAL_ASSESSMENT: 0-10

## 2024-10-02 ASSESSMENT — COGNITIVE AND FUNCTIONAL STATUS - GENERAL
DAILY ACTIVITIY SCORE: 24
MOBILITY SCORE: 24

## 2024-10-02 ASSESSMENT — PAIN DESCRIPTION - LOCATION: LOCATION: ABDOMEN

## 2024-10-02 NOTE — CONSULTS
"Nutrition Assessment Note  Nutrition Assessment      Reason for Assessment  Reason for Assessment: Provider consult order (post op bariatric diet education)    Received consult to review post-op Bariatric diet with pt.     Procedure: sleeve gastrectomy on (10/01/2024)    Pt has been provided with Full Liquid diet handout s/p surgery. States has vitamin and protein drinks at home. Denies further questions or concerns at this time, aware RD available if needs arise, pt instructed to follow up with out patient bariatric dietitian.     Vitals:    10/02/24 0413   Weight: 115 kg (254 lb 6.6 oz)      Body mass index is 39.85 kg/m².     Anthropometrics:  Height: 170.2 cm (5' 7\")  Weight: 115 kg (254 lb 6.6 oz)  BMI (Calculated): 39.84    Weight Change: -1.1      Education Documentation  Nutrition instructions after weight loss surgery, taught by Saige Felix RD at 10/2/2024 11:36 AM.  Learner: Patient  Readiness: Acceptance  Method: Explanation, Handout  Response: Verbalizes Understanding, Needs Reinforcement        Follow Up  Time Spent (min): 30 minutes  Last Date of Nutrition Visit: 10/02/24  Nutrition Follow-Up Needed?: Dietitian to reassess per policy       "

## 2024-10-02 NOTE — CARE PLAN
The patient's goals for the shift include  pain control    Problem: Pain - Adult  Goal: Verbalizes/displays adequate comfort level or baseline comfort level  Outcome: Progressing     Problem: Safety - Adult  Goal: Free from fall injury  Outcome: Progressing     Problem: Discharge Planning  Goal: Discharge to home or other facility with appropriate resources  Outcome: Progressing     Problem: Chronic Conditions and Co-morbidities  Goal: Patient's chronic conditions and co-morbidity symptoms are monitored and maintained or improved  Outcome: Progressing     The clinical goals for the shift include 2 clears an hour, mobility, helping relief gas pain/discomfort

## 2024-10-02 NOTE — DISCHARGE SUMMARY
Discharge Diagnosis  Morbid obesity (Multi)    Issues Requiring Follow-Up  Pathology results from surgery    Test Results Pending At Discharge  Pending Labs       Order Current Status    Surgical Pathology Exam In process            Hospital Course   Tiffanie Burdick underwent an uncomplicated laparoscopy sleeve gastrectomy 10/1/24 by Dr Kwok. she recovered on the regular nursing floor. she was started on a clear liquid diet. On POD#1 she had an esophagram which showed no leak or obstruction. she was then advanced to a full liquid diet. On discharge, her pain was controlled on oral pain medications, ambulating and voiding without difficulty, and she was tolerating an adequate amount of liquids by mouth. she was discharged in good condition on POD#1.        Home Medications     Medication List      START taking these medications     acetaminophen 325 mg tablet; Commonly known as: Tylenol; Take 2 tablets   (650 mg) by mouth every 6 hours.   enoxaparin 40 mg/0.4 mL syringe; Commonly known as: Lovenox; Inject 0.4   mL (40 mg) under the skin 2 times a day for 28 days.   omeprazole 40 mg DR capsule; Commonly known as: PriLOSEC; Take 1 capsule   (40 mg) by mouth once daily in the morning. Take before meals. Do not   crush or chew. Open capsule, sprinkle beads on SF jello, pudding or   applesauce.   ondansetron 4 mg tablet; Commonly known as: Zofran; Take 1 tablet (4 mg)   by mouth every 6 hours if needed for nausea or vomiting.   simethicone 80 mg chewable tablet; Commonly known as: Mylicon; Chew 1   tablet (80 mg) every 4 hours if needed for flatulence (gas pain).     CONTINUE taking these medications     levothyroxine 112 mcg tablet; Commonly known as: Synthroid, Levoxyl;   Take 1 tablet (112 mcg) by mouth early in the morning.. Take on an empty   stomach at the same time each day, either 30 to 60 minutes prior to   breakfast   losartan 25 mg tablet; Commonly known as: Cozaar; Take 1 tablet (25 mg)   by mouth once  daily.   multivitamin with minerals tablet   oxyCODONE 5 mg immediate release tablet; Commonly known as: Roxicodone;   Take 1 tablet (5 mg) by mouth every 6 hours if needed for severe pain or   moderate pain for up to 6 days.   SUMAtriptan 100 mg tablet; Commonly known as: Imitrex       Outpatient Follow-Up  Future Appointments   Date Time Provider Department Center   10/9/2024  2:00 PM She Allen RDN, LD [id number] [facility]   10/18/2024  9:00 AM MD Capo [id number]   11/13/2024  1:30 PM She Allen RDN, LD [id number]   11/15/2024  9:30 AM MD Alejo [id number] [facility]   12/18/2024  1:00 PM She Allen RDN, VICENTE [id number] [facility]   12/19/2024  1:15 PM REBECCA Reich [id number] [facility]   5/9/2025  8:00 AM LOS Garcia-RU [id number] [facility]       Zachary Kwok MD

## 2024-10-02 NOTE — PROGRESS NOTES
"BARIATRIC SURGERY PROGRESS NOTE    PATIENT NAME: Tiffanie Burdick  MRN: [id number]  DATE: 10/2/2024    SUMMARY OF CURRENT STATUS  - POD # 1 s/p lap Sleeve Gastrectomy  - doing well. Tolerating clear liquids and protein shake  - esophagram negative for leak  - hgb did drop 2.4 grams post op, but this is likely dilutional as the patient was very dehydrated and required a lot of intra-op and post op fluids. Repeat hgb is stable today.  - denies any major abdominal pain, nausea, SOB, chest pain, leg pain    TODAY'S ASSESSMENT:  - New complications over the past 24 hours: No  - No concerns overnight  - Pain controlled: Yes  - DVT prophylaxis:  Yes - Lovenox and SCDs  - Diet is currently: Phase 2  - Nausea:  No  - Emesis:  No  - Pt has sat in the chair / ambulated    ON EXAMINATION:  /72   Pulse 69   Temp 36.8 °C (98.2 °F)   Resp 16   Ht 1.702 m (5' 7\")   Wt 115 kg (254 lb 6.6 oz)   SpO2 97%   BMI 39.85 kg/m²   APPEARANCE: NAD, looks well.  ABDOMEN:  Soft, juan-incisional tenderness, nondistended  WOUND(S):  Incisions Clean / Dry / Intact    INS/OUTS:    Intake/Output Summary (Last 24 hours) at 10/2/2024 1152  Last data filed at 10/2/2024 1048  Gross per 24 hour   Intake 1112.49 ml   Output 1850 ml   Net -737.51 ml        BLOOD WORK:  Results for orders placed or performed during the hospital encounter of 10/01/24 (from the past 24 hour(s))   Comprehensive metabolic panel   Result Value Ref Range    Glucose 92 74 - 99 mg/dL    Sodium 135 (L) 136 - 145 mmol/L    Potassium 4.0 3.5 - 5.3 mmol/L    Chloride 107 98 - 107 mmol/L    Bicarbonate 22 21 - 32 mmol/L    Anion Gap 10 10 - 20 mmol/L    Urea Nitrogen 8 6 - 23 mg/dL    Creatinine 0.70 0.50 - 1.05 mg/dL    eGFR >90 >60 mL/min/1.73m*2    Calcium 8.2 (L) 8.6 - 10.3 mg/dL    Albumin 3.9 3.4 - 5.0 g/dL    Alkaline Phosphatase 33 33 - 110 U/L    Total Protein 6.2 (L) 6.4 - 8.2 g/dL    AST 21 9 - 39 U/L    Bilirubin, Total 0.5 0.0 - 1.2 mg/dL    ALT 33 7 - 45 U/L "   CBC and Auto Differential   Result Value Ref Range    WBC 10.6 4.4 - 11.3 x10*3/uL    nRBC 0.0 0.0 - 0.0 /100 WBCs    RBC 4.07 4.00 - 5.20 x10*6/uL    Hemoglobin 12.8 12.0 - 16.0 g/dL    Hematocrit 37.5 36.0 - 46.0 %    MCV 92 80 - 100 fL    MCH 31.4 26.0 - 34.0 pg    MCHC 34.1 32.0 - 36.0 g/dL    RDW 12.2 11.5 - 14.5 %    Platelets 279 150 - 450 x10*3/uL    Neutrophils % 81.3 40.0 - 80.0 %    Immature Granulocytes %, Automated 0.3 0.0 - 0.9 %    Lymphocytes % 11.3 13.0 - 44.0 %    Monocytes % 7.0 2.0 - 10.0 %    Eosinophils % 0.0 0.0 - 6.0 %    Basophils % 0.1 0.0 - 2.0 %    Neutrophils Absolute 8.63 (H) 1.20 - 7.70 x10*3/uL    Immature Granulocytes Absolute, Automated 0.03 0.00 - 0.70 x10*3/uL    Lymphocytes Absolute 1.20 1.20 - 4.80 x10*3/uL    Monocytes Absolute 0.74 0.10 - 1.00 x10*3/uL    Eosinophils Absolute 0.00 0.00 - 0.70 x10*3/uL    Basophils Absolute 0.01 0.00 - 0.10 x10*3/uL   Hemoglobin and hematocrit, blood   Result Value Ref Range    Hemoglobin 12.4 12.0 - 16.0 g/dL    Hematocrit 35.8 (L) 36.0 - 46.0 %       IMPRESSION:  - Pt is doing well / as expected s/p Bariatric Surgery  - No new issues/concerns    PLAN POD1:  - Esophagram today  - Advance to Bariatric Phase 2 diet following esophagram.  Goal 40 oz prior to discharge home, 64 oz daily of fluids.  - Multimodal pain regimen: scheduled tylenol, scheduled toradol, PRN oxycodone  - Encourage Ambulation / use of incentive spirometry  - VTE prophylaxis - Continue with SCDs and Lovenox  - Disposition - Likely home later today versus tomorrow    Zachary Kwok MD  Bariatric and Minimally Invasive General Surgery

## 2024-10-02 NOTE — PROGRESS NOTES
10/02/24 0813   Discharge Planning   Living Arrangements Spouse/significant other;Children   Support Systems Spouse/significant other;Children   Assistance Needed A&OX3; independent with ADLs with no DME; drives; room air baseline and currently room air   Type of Residence Private residence   Home or Post Acute Services None   Expected Discharge Disposition Home        10/02/24 1200   Discharge Planning   Expected Discharge Disposition Home  (Pt to dc if intake goals met. Patient denies home going needs. Patient able to get transport home once dc completed.)

## 2024-10-03 ENCOUNTER — DOCUMENTATION (OUTPATIENT)
Dept: PRIMARY CARE | Facility: CLINIC | Age: 39
End: 2024-10-03
Payer: COMMERCIAL

## 2024-10-03 ENCOUNTER — PATIENT OUTREACH (OUTPATIENT)
Dept: PRIMARY CARE | Facility: CLINIC | Age: 39
End: 2024-10-03
Payer: COMMERCIAL

## 2024-10-03 NOTE — PROGRESS NOTES
Patient states that she will no longer be going to this practice. She has multiple follow ups with specialists scheduled. She states that she is feeling okay but admits to tolerable post-op pain. She was thankful for this call.

## 2024-10-04 ENCOUNTER — TELEPHONE (OUTPATIENT)
Dept: SURGERY | Facility: CLINIC | Age: 39
End: 2024-10-04
Payer: COMMERCIAL

## 2024-10-04 NOTE — SIGNIFICANT EVENT
Follow Up Phone Call    Outgoing phone call    Spoke to: Tiffanie Burdick Relationship:self   Phone number: [phone]      Outcome: I left a message on answering machine   No chief complaint on file.         Diagnosis:Not applicable

## 2024-10-04 NOTE — TELEPHONE ENCOUNTER
This patient called RN asking if it would be ok to advance her diet to full liquids. This RN messaged Dr. Kwok to assess if it would be appropriate for patient to advance diet. Per Dr. Kwok patient has been on full liquids since discharge and can go ahead and stick with the full liquids. Patient verbalized understanding. RN educated patient to call RN with any other questions or concerns.

## 2024-10-08 NOTE — PROGRESS NOTES
"Follow Up Bariatric Nutrition Assessment    Name: Tiffanie Burdick  MRN: [id number]  Date: 10/09/24    Surgery Date:  10/1/24  Surgeon:  Rissa  Procedure:  Sleeve Gastrectomy    ASSESSMENT:  Current weight:    Vitals:    10/09/24 1412   Weight: 108 kg (237 lb)     Ht:   1.702 m (5' 7\")     BMI: Body mass index is 37.12 kg/m².    Previous weight:  255lbs  Initial start weight:  264lbs  EBW: 107lbs  Total weight change: 27lbs  %EBW Lost: 25.2%    PROGRESS:    Nutrition Interventions for last encounter (date):   Drink 64 oz of fluid daily  Drink enough protein shakes to meet your goal of 60-70 g of protein per day  Take 2 chewable multivitamins, 2492-6047 mg of calcium citrate, and 500 mcg of B12 daily  Get up and walk frequently throughout the day.     CHANGES IN TREATMENT:   Patient met goals:  Yes        24 hour food recall:   Beverages: Isopure (25g, 32oz) and Fairlife/Premier protein, bone broth, SF pudding    Alcohol: no       Vitamins:  Bariatric Pal MVI w/B12, 500 mg Calcium x1  Medications:   Current Outpatient Medications:     acetaminophen (Tylenol) 325 mg tablet, Take 2 tablets (650 mg) by mouth every 6 hours., Disp: , Rfl:     enoxaparin (Lovenox) 40 mg/0.4 mL syringe, Inject 0.4 mL (40 mg) under the skin 2 times a day for 28 days., Disp: 56 each, Rfl: 0    levothyroxine (Synthroid, Levoxyl) 112 mcg tablet, Take 1 tablet (112 mcg) by mouth early in the morning.. Take on an empty stomach at the same time each day, either 30 to 60 minutes prior to breakfast, Disp: 30 tablet, Rfl: 1    losartan (Cozaar) 25 mg tablet, Take 1 tablet (25 mg) by mouth once daily., Disp: 90 tablet, Rfl: 0    multivitamin with minerals tablet, Take 1 tablet by mouth once daily. 3/20/24 [facility], Disp: , Rfl:     omeprazole (PriLOSEC) 40 mg DR capsule, Take 1 capsule (40 mg) by mouth once daily in the morning. Take before meals. Do not crush or chew. Open capsule, sprinkle beads on SF jello, pudding or " applesauce., Disp: 30 capsule, Rfl: 5    ondansetron (Zofran) 4 mg tablet, Take 1 tablet (4 mg) by mouth every 6 hours if needed for nausea or vomiting., Disp: 15 tablet, Rfl: 1    simethicone (Mylicon) 80 mg chewable tablet, Chew 1 tablet (80 mg) every 4 hours if needed for flatulence (gas pain)., Disp: , Rfl:     SUMAtriptan (Imitrex) 100 mg tablet, Take by mouth., Disp: , Rfl:     Physical Activity: walking around     READINESS TO LEARN:  Motivation to learn:  Interested     Understanding of instruction: Good    Anticipated Compliance:   Good     Family Support: Unable to assess-family not present     Patient presents with post-op weight loss surgery: sleeve gastrectomy. The pt is 1 week post op. The pt is back to work. Patient has lost 27 pounds since initial assessment accounting for 25.2% loss excess body weight. Has been tracking in the [employer].   Patient tolerating full liquid diet.  Protein intake is adequate for post-op individual. Fluid consumption is adequate. Patient is supplementing recommended vitamin/minerals. Needs to take 3 Ca supplements per day. Pt states no concerns and/or difficulties.    Reviewed the puree and soft foods. Reminded pt. to eat slowly, chew thoroughly and to try one new food at a time.    Malnutrition Screening  Significant unintentional weight loss? n/a  Eating less than 75% of usual intake for more than 2 weeks? n/a      Nutrition Diagnosis:   Increased protein and nutrition needs related to altered GI function as evidenced by pt. s/p sleeve gastrectomy.  Food- and nutrition-related knowledge deficit related to lack of prior exposure to surgical weight loss information as evidenced by diet recall.     Nutrition Interventions:   Modify type and amount of food and nutrients within meals and snacks.  Comprehensive Nutrition Education  -Nutrition education materials: none        Recommendations:    Continue to drink your protein shakes to meet your goal of 60-70 g of protein  per day. Begin measuring how much protein you can eat on the soft diet so that you know when to start weaning off of your protein shakes.   Continue to drink 64 oz. of zero calorie beverages per day  Continue no drinking 30 min before, during the meal and for 30 minutes after the meal  Continue to exercise   Advance to the puree diet tomorrow,  then soft food on 10/24  Remember to eat slowly and chew thoroughly  Try one new food at a time to test for any intolerances.   Continue to take all of your vitamins and minerals. Be sure to take 3 Ca supplements daily.     Nutrition Monitoring and Evaluation:   1-2 pounds weight loss per week  Criteria: weight check, food recall  Need for Follow-up: 6 weeks post op     She Allen MS, RD, LD  Phone: [phone]

## 2024-10-09 ENCOUNTER — APPOINTMENT (OUTPATIENT)
Dept: SURGERY | Facility: CLINIC | Age: 39
End: 2024-10-09
Payer: COMMERCIAL

## 2024-10-09 VITALS — BODY MASS INDEX: 37.2 KG/M2 | HEIGHT: 67 IN | WEIGHT: 237 LBS

## 2024-10-14 ENCOUNTER — TELEPHONE (OUTPATIENT)
Dept: SURGERY | Facility: CLINIC | Age: 39
End: 2024-10-14
Payer: COMMERCIAL

## 2024-10-14 NOTE — TELEPHONE ENCOUNTER
This RN attempted to call patient to gather information prior to your scheduled visit with Dr. Kwok on 10/18/24 at 9:00 am at [facility] (inside [facility], main floor in the [facility]).   Parking is available at a cost of $5.00 at the Main Entrance.  We look forward to seeing you!  Jessica Singh RN, Clinic Nurse

## 2024-10-18 ENCOUNTER — APPOINTMENT (OUTPATIENT)
Dept: SURGERY | Facility: CLINIC | Age: 39
End: 2024-10-18
Payer: COMMERCIAL

## 2024-10-18 ENCOUNTER — TELEPHONE (OUTPATIENT)
Dept: PULMONOLOGY | Facility: CLINIC | Age: 39
End: 2024-10-18

## 2024-10-18 VITALS
SYSTOLIC BLOOD PRESSURE: 98 MMHG | BODY MASS INDEX: 36.66 KG/M2 | HEART RATE: 75 BPM | WEIGHT: 233.6 LBS | DIASTOLIC BLOOD PRESSURE: 65 MMHG | HEIGHT: 67 IN | OXYGEN SATURATION: 95 %

## 2024-10-18 DIAGNOSIS — D68.51 FACTOR V LEIDEN MUTATION (MULTI): ICD-10-CM

## 2024-10-18 DIAGNOSIS — Z98.84 S/P LAPAROSCOPIC SLEEVE GASTRECTOMY: Primary | ICD-10-CM

## 2024-10-18 DIAGNOSIS — I10 PRIMARY HYPERTENSION: ICD-10-CM

## 2024-10-18 DIAGNOSIS — E66.01 MORBID OBESITY DUE TO EXCESS CALORIES (MULTI): ICD-10-CM

## 2024-10-18 DIAGNOSIS — G47.33 OBSTRUCTIVE SLEEP APNEA (ADULT) (PEDIATRIC): ICD-10-CM

## 2024-10-18 DIAGNOSIS — E03.9 HYPOTHYROIDISM, ADULT: ICD-10-CM

## 2024-10-18 RX ORDER — CYCLOBENZAPRINE HCL 5 MG
5 TABLET ORAL 3 TIMES DAILY PRN
Qty: 15 TABLET | Refills: 1 | Status: SHIPPED | OUTPATIENT
Start: 2024-10-18

## 2024-10-18 NOTE — TELEPHONE ENCOUNTER
I called and left voicemail on patients phone that jimmy lowered the settings from 5-15 to 4-12 and I instructed her to unplug and re plug her machine back in for the settings to change. I also told pt to try these settings out for about a week then let us know if this helped.

## 2024-10-18 NOTE — TELEPHONE ENCOUNTER
Patient called and said that she had her bariatric surgery already but that she has been feeling like the machine pressure is too much air and she feels like it is choking her and also pushing so much air it is going into her stomach and she can't seem to calm the machine down.     Patient is wondering if you are able to lower the pressure.    [phone]

## 2024-10-18 NOTE — PROGRESS NOTES
"BARIATRIC SURGERY CLINIC  FOLLOW UP NOTE      Name: Tiffanie Burdick  MRN: [id number]      Index Surgery  Date of Surgery: 10/1/24   Surgeon: Zachary Kwok MD  Surgical Procedure: Laparoscopic sleeve gastrectomy  89659  Initial weight: 263 lbs  Pre-surgical weight: 258 lbs      Other Bariatric Surgeries  none    Visit: 2    weeks  Today's Visit:   Wt Readings from Last 1 Encounters:   10/18/24 106 kg (233 lb 9.6 oz)    Body mass index is 36.59 kg/m².   Last Visit:    Wt Readings from Last 3 Encounters:   10/18/24 106 kg (233 lb 9.6 oz)   10/09/24 108 kg (237 lb)   10/02/24 115 kg (254 lb 6.6 oz)        Total weight loss: 25 lbs    HPI: has a \"janell horse\" in her left upper quadrant - feels muscular. Worse with movement. Improves with oxycodone.     DIET INTAKE: Pureed    Diet History:     Fluid intake: 64 oz/day    DAILY SUPPLEMENTS:  Calcium: Calcium Citrate w/ vitamin D (1200 - 1500mg)  Multivitamin & Minerals: 1 per day - bariatric pal combo vit with iron  Iron Supplement: included in multi-vitamin  Vitamin B12: included in multi-vitamin  Vitamin D3: included in multi-vitamin  Other: none  PPI:omeprazole    EXERCISE: not yet    Symptoms:      Nausea/vomiting: denies   Food intolerance: denies   Constipation: denies   Diarrhea: denies   Experiencing dumping: denies   Abdominal pain: left upper quadrant muscular pain   Reflux: occasional, especially when wearing CPAP Are you on medications: yes - omeprazole      Current Outpatient Medications   Medication Sig Dispense Refill    acetaminophen (Tylenol) 325 mg tablet Take 2 tablets (650 mg) by mouth every 6 hours.      enoxaparin (Lovenox) 40 mg/0.4 mL syringe Inject 0.4 mL (40 mg) under the skin 2 times a day for 28 days. 56 each 0    levothyroxine (Synthroid, Levoxyl) 112 mcg tablet Take 1 tablet (112 mcg) by mouth early in the morning.. Take on an empty stomach at the same time each day, either 30 to 60 minutes prior to breakfast 30 tablet 1    losartan (Cozaar) " 25 mg tablet Take 1 tablet (25 mg) by mouth once daily. 90 tablet 0    multivitamin with minerals tablet Take 1 tablet by mouth once daily. 3/20/24 VERBALIZE STARTED TAKING RECENTLY [facility] L[facility]      omeprazole (PriLOSEC) 40 mg DR capsule Take 1 capsule (40 mg) by mouth once daily in the morning. Take before meals. Do not crush or chew. Open capsule, sprinkle beads on SF jello, pudding or applesauce. 30 capsule 5    ondansetron (Zofran) 4 mg tablet Take 1 tablet (4 mg) by mouth every 6 hours if needed for nausea or vomiting. 15 tablet 1    simethicone (Mylicon) 80 mg chewable tablet Chew 1 tablet (80 mg) every 4 hours if needed for flatulence (gas pain).      cyclobenzaprine (Flexeril) 5 mg tablet Take 1 tablet (5 mg) by mouth 3 times a day as needed for muscle spasms. 15 tablet 1    SUMAtriptan (Imitrex) 100 mg tablet Take by mouth.       No current facility-administered medications for this visit.       Comorbidities:  Primary hypertension  On cozaar    Factor V Leiden mutation (Multi)  On 4 weeeks of extended vte prophylaxis.     Obstructive sleep apnea (adult) (pediatric)  Feels like her CPAP is choking her and giving her reflux.        REVIEW OF SYSTEMS:  CONSTITUTIONAL: Patient denies fevers, chills, sweats and weight changes.  EYES: Patient denies any visual symptoms.  EARS, NOSE, AND THROAT: No difficulties with hearing. No symptoms of rhinitis or sore throat.  CARDIOVASCULAR: Patient denies chest pains, palpitations, orthopnea and paroxysmal nocturnal dyspnea.  RESPIRATORY: No dyspnea on exertion, no wheezing or cough.  GI: No nausea, vomiting, diarrhea, constipation, abdominal pain, hematochezia or melena.  : No urinary hesitancy or dribbling. No nocturia or urinary frequency. No abnormal urethral discharge.  MUSCULOSKELETAL: No myalgias or arthralgias.  NEUROLOGIC: No chronic headaches, no seizures. Patient denies numbness, tingling or weakness.  PSYCHIATRIC: Patient denies problems with mood disturbance. No  "problems with anxiety.  ENDOCRINE: No excessive urination or excessive thirst.  DERMATOLOGIC: Patient denies any rashes or skin changes.    PHYSICAL EXAM:  BP 98/65 (BP Location: Right arm, Patient Position: Sitting, BP Cuff Size: Large adult long)   Pulse 75   Ht 1.702 m (5' 7\")   Wt 106 kg (233 lb 9.6 oz)   SpO2 95%   BMI 36.59 kg/m²   GENERAL: Obese. No apparent distress. Pt is alert and oriented x3.  HEENT: Head is normocephalic and atraumatic. Extraocular muscles are intact. Pupils are equal, round, and reactive to light and accommodation. Nares appeared normal. Mouth is well hydrated and without lesions. Mucous membranes are moist. Posterior pharynx clear of any exudate or lesions.  NECK: Supple. No carotid bruits. No lymphadenopathy or thyromegaly.  LUNGS: Clear to auscultation.  HEART: Regular rate and rhythm without murmur.  ABDOMEN: Soft, nontender, and nondistended. Positive bowel sounds. No hepatosplenomegaly was noted.  EXTREMITIES: Without any cyanosis, clubbing, rash, lesions or edema.  NEUROLOGIC: Cranial nerves II through XII are grossly intact.  PSYCHIATRIC: Flat affect, but denies suicidal or homicidal ideations.  SKIN: No ulceration or induration present.      A/P: Normal post-OP course    No sign of infection, Doing well, Fair pain control, and tolerating diet    Energy: Energy good    Weight loss: Steady    Recommendations: follow up sleep study, counseled on exercise, No heavy lifting > 10 lbs for: 2 more weeks, Fluid intake 60 oz/day, Protein 60 g/day, counseled on diet: advance per dietician guidelines, Plan your meals, and Continue taking vitamins as directed.    Follow up: 4 weeks    Flexeril prescribed for MSK pain in left side.    Zachary Kwok MD  Bariatric and Minimally Invasive General Surgery            "

## 2024-10-24 ENCOUNTER — TELEPHONE (OUTPATIENT)
Dept: SURGERY | Facility: CLINIC | Age: 39
End: 2024-10-24
Payer: COMMERCIAL

## 2024-10-24 NOTE — TELEPHONE ENCOUNTER
This patient called RN stating that when she gave herself her Lovenox shot last night the area became super tender and there was a slight bump under the patient's skin. RN called patient to assess how the are was feeling today and patient stated that the area feels better and that it is not as tender as yesterday. Patient stated that there is no apparent bruise at the location. RN asked if patient could send a picture via her MyChart so that staff can see the area. Patient verbalized that she would and had no further questions.

## 2024-11-11 ENCOUNTER — TELEPHONE (OUTPATIENT)
Dept: SURGERY | Facility: CLINIC | Age: 39
End: 2024-11-11
Payer: COMMERCIAL

## 2024-11-11 NOTE — TELEPHONE ENCOUNTER
Thank you for speaking with me earlier today & confirming your scheduled visit with Dr. Kwok on 11/15/241 at 9:30 am at [facility] (inside [facility], main floor in the [facility]).   Parking is available at a cost of $5.00 at the Main Entrance.  We look forward to seeing you!  Jessica Singh RN, Clinic Nurse

## 2024-11-12 NOTE — PROGRESS NOTES
"Follow Up Bariatric Nutrition Assessment    Name: Tiffanie Burdick  MRN: [id number]  Date: 11/15/24     Surgery Date:  10/1/24  Surgeon:  Rissa  Procedure:  Sleeve Gastrectomy    ASSESSMENT:  Current weight:     Vitals:    11/15/24 0837   Weight: 101 kg (223 lb)                  Ht:  1.702 m (5' 7\")      BMI:  Body mass index is 34.93 kg/m².    Previous weight:  237lbs  Initial start weight:  264lbs  EBW: 107lbs  Total weight change:  41lbs  %EBW Lost: 38.3%    PROGRESS:  Nutrition Interventions for last encounter   Continue to drink your protein shakes to meet your goal of 60-70 g of protein per day. Begin measuring how much protein you can eat on the soft diet so that you know when to start weaning off of your protein shakes.   Continue to drink 64 oz. of zero calorie beverages per day  Continue no drinking 30 min before, during the meal and for 30 minutes after the meal  Continue to exercise  Advance to the puree diet for 1 week, then soft food for 3 weeks  Remember to eat slowly and chew thoroughly  Try one new food at a time to test for any intolerances.   Continue to take all of your vitamins and minerals.     CHANGES IN TREATMENT:   Patient met goals:  Yes         24 hour food recall:   Breakfast: eggs or greek yogurt sometimes w/high protein cereal or salmon   Snack:   Lunch: protein shake   Snack:  salmon or shrimp or cheese and crackers   Dinner:  taco bowl   Snack:   Beverages: water and vitamin water zero   Alcohol: no       Vitamins:  Bariatric Pal MVI w/B12, 500 mg Calcium x3  Medications:   Current Outpatient Medications:     acetaminophen (Tylenol) 325 mg tablet, Take 2 tablets (650 mg) by mouth every 6 hours., Disp: , Rfl:     cyclobenzaprine (Flexeril) 5 mg tablet, Take 1 tablet (5 mg) by mouth 3 times a day as needed for muscle spasms., Disp: 15 tablet, Rfl: 1    levothyroxine (Synthroid, Levoxyl) 112 mcg tablet, Take 1 tablet (112 mcg) by mouth early in the morning.. Take on an empty stomach at the " same time each day, either 30 to 60 minutes prior to breakfast, Disp: 30 tablet, Rfl: 1    losartan (Cozaar) 25 mg tablet, Take 1 tablet (25 mg) by mouth once daily., Disp: 90 tablet, Rfl: 0    multivitamin with minerals tablet, Take 1 tablet by mouth once daily. 3/20/24 VERBALIZE STARTED TAKING RECENTLY [facility] LPN, Disp: , Rfl:     omeprazole (PriLOSEC) 40 mg DR capsule, Take 1 capsule (40 mg) by mouth once daily in the morning. Take before meals. Do not crush or chew. Open capsule, sprinkle beads on SF jello, pudding or applesauce., Disp: 30 capsule, Rfl: 5    ondansetron (Zofran) 4 mg tablet, Take 1 tablet (4 mg) by mouth every 6 hours if needed for nausea or vomiting., Disp: 15 tablet, Rfl: 1    simethicone (Mylicon) 80 mg chewable tablet, Chew 1 tablet (80 mg) every 4 hours if needed for flatulence (gas pain). (Patient not taking: Reported on 11/11/2024), Disp: , Rfl:     SUMAtriptan (Imitrex) 100 mg tablet, Take by mouth., Disp: , Rfl:     Physical Activity: walking and weight lifting for 30 minutes 3x per week     READINESS TO LEARN:  Motivation to learn:   Interested      Understanding of instruction: Good       Anticipated Compliance:  Good       Family Support: Unable to assess-family not present     Patient presents with post-op weight loss surgery sleeve gastrectomy. The pt is 6 weeks postop.   Patient has lost 41 pounds since initial assessment accounting for 38.3% loss excess body weight.  Tolerating regular diet without difficulty.  Protein intake is adequate for post-op individual. Fluid consumption is not always adequate. Noted that she was avoiding drinking with snacks. Patient is supplementing recommended vitamin/minerals. Pt states no concerns/difficulties.  Discussed the transition diet. Reminded pt to eat slowly, chew thoroughly and to try on new food at a time.     Malnutrition Screening  Significant unintentional weight loss? n/a  Eating less than 75% of usual intake for more than 2 weeks?  n/a    Nutrition Diagnosis:   Increased protein and nutrition needs related to altered GI function as evidenced by pt. s/p sleeve gastrectomy.  Food- and nutrition-related knowledge deficit related to lack of prior exposure to surgical weight loss information as evidenced by diet recall.     Nutrition Interventions:   Modify type and amount of food and nutrients within meals and snacks.  Comprehensive Nutrition Education  -Nutrition education materials: none      Recommendations:    Eat 70-80g of protein per day  Drink 64 oz. of zero calorie beverages per day  Continue no drinking 30 min before, during the meal and for 30 minutes after the meal. You do not have to practice this around snacks.   Increase intensity and duration of exercise  Advance to transition diet. Try raw veggies, fresh fruit and lean ground beef.   Eat slowly, chew thoroughly  Try one new food at a time.   Continue current vit/min regimen    Nutrition Monitoring and Evaluation:   1-2 pounds weight loss per week  Criteria: weight check, food recall  Need for Follow-up: 3 months    She Allen MS, RD, LD  Phone: [phone]

## 2024-11-13 ENCOUNTER — APPOINTMENT (OUTPATIENT)
Dept: SURGERY | Facility: CLINIC | Age: 39
End: 2024-11-13
Payer: COMMERCIAL

## 2024-11-15 ENCOUNTER — PHARMACY VISIT (OUTPATIENT)
Dept: PHARMACY | Facility: CLINIC | Age: 39
End: 2024-11-15
Payer: MEDICARE

## 2024-11-15 ENCOUNTER — NUTRITION (OUTPATIENT)
Dept: SURGERY | Facility: CLINIC | Age: 39
End: 2024-11-15
Payer: COMMERCIAL

## 2024-11-15 ENCOUNTER — APPOINTMENT (OUTPATIENT)
Dept: SURGERY | Facility: CLINIC | Age: 39
End: 2024-11-15
Payer: COMMERCIAL

## 2024-11-15 VITALS
HEIGHT: 67 IN | WEIGHT: 224.7 LBS | SYSTOLIC BLOOD PRESSURE: 118 MMHG | OXYGEN SATURATION: 99 % | HEART RATE: 69 BPM | BODY MASS INDEX: 35.27 KG/M2 | DIASTOLIC BLOOD PRESSURE: 83 MMHG

## 2024-11-15 VITALS — HEIGHT: 67 IN | BODY MASS INDEX: 35 KG/M2 | WEIGHT: 223 LBS

## 2024-11-15 DIAGNOSIS — E66.01 MORBID OBESITY (MULTI): ICD-10-CM

## 2024-11-15 DIAGNOSIS — I10 BENIGN ESSENTIAL HYPERTENSION: ICD-10-CM

## 2024-11-15 DIAGNOSIS — Z90.3 INTESTINAL MALABSORPTION FOLLOWING GASTRECTOMY (HHS-HCC): ICD-10-CM

## 2024-11-15 DIAGNOSIS — I10 PRIMARY HYPERTENSION: ICD-10-CM

## 2024-11-15 DIAGNOSIS — K91.2 INTESTINAL MALABSORPTION FOLLOWING GASTRECTOMY (HHS-HCC): ICD-10-CM

## 2024-11-15 DIAGNOSIS — G47.33 OBSTRUCTIVE SLEEP APNEA (ADULT) (PEDIATRIC): ICD-10-CM

## 2024-11-15 DIAGNOSIS — D68.51 FACTOR V LEIDEN (MULTI): ICD-10-CM

## 2024-11-15 DIAGNOSIS — Z98.84 S/P LAPAROSCOPIC SLEEVE GASTRECTOMY: Primary | ICD-10-CM

## 2024-11-15 DIAGNOSIS — E03.9 HYPOTHYROIDISM, UNSPECIFIED TYPE: ICD-10-CM

## 2024-11-15 DIAGNOSIS — E06.3 HASHIMOTO'S THYROIDITIS: ICD-10-CM

## 2024-11-15 PROCEDURE — 99024 POSTOP FOLLOW-UP VISIT: CPT | Performed by: SURGERY

## 2024-11-15 PROCEDURE — 3008F BODY MASS INDEX DOCD: CPT | Performed by: SURGERY

## 2024-11-15 PROCEDURE — RXMED WILLOW AMBULATORY MEDICATION CHARGE

## 2024-11-15 PROCEDURE — 1036F TOBACCO NON-USER: CPT | Performed by: SURGERY

## 2024-11-15 PROCEDURE — 3074F SYST BP LT 130 MM HG: CPT | Performed by: SURGERY

## 2024-11-15 PROCEDURE — 3079F DIAST BP 80-89 MM HG: CPT | Performed by: SURGERY

## 2024-11-15 RX ORDER — OMEPRAZOLE 40 MG/1
40 CAPSULE, DELAYED RELEASE ORAL
Qty: 30 CAPSULE | Refills: 5 | Status: SHIPPED | OUTPATIENT
Start: 2024-11-15

## 2024-11-15 RX ORDER — LEVOTHYROXINE SODIUM 112 UG/1
112 TABLET ORAL DAILY
Qty: 30 TABLET | Refills: 1 | Status: SHIPPED | OUTPATIENT
Start: 2024-11-15 | End: 2025-01-14

## 2024-11-15 RX ORDER — LOSARTAN POTASSIUM 25 MG/1
12.5 TABLET ORAL DAILY
Start: 2024-11-15 | End: 2025-02-13

## 2024-11-15 NOTE — ASSESSMENT & PLAN NOTE
Has skipped a few doses and her BP is great. Wants to try decreasing dose. Will start her on 1/2 tab today. She has follow up in < 2 mo with new pcp

## 2024-11-15 NOTE — PROGRESS NOTES
BARIATRIC SURGERY CLINIC  FOLLOW UP NOTE      Name: Tiffanie Burdick  MRN: [id number]      Index Surgery  Date of Surgery: 10/1/24   Surgeon: Zachary Kwok MD  Surgical Procedure: Laparoscopic sleeve gastrectomy  [id number]  Initial weight: 263 lbs  Pre-surgical weight: 258 lbs        Other Bariatric Surgeries  none    Visit: 6    weeks  Today's Visit:   Wt Readings from Last 1 Encounters:   11/15/24 102 kg (224 lb 11.2 oz)    Body mass index is 35.19 kg/m².   Last Visit:    Wt Readings from Last 3 Encounters:   11/15/24 102 kg (224 lb 11.2 oz)   11/15/24 101 kg (223 lb)   10/18/24 106 kg (233 lb 9.6 oz)       Total weight loss: 34 LBS    HPI: doing well.     DIET INTAKE: Regular food    Diet History:     Fluid intake: 64 oz/day  Eating 3 meals a day.  Snacks: snacks between lunch and dinner and occasionally after dinner    DAILY SUPPLEMENTS:  Calcium: Calcium Citrate w/ vitamin D (1200 - 1500mg)  Multivitamin & Minerals: 1 per day - bariatric pal combo vit with iron  Iron Supplement: included in multi-vitamin  Vitamin B12: included in multi-vitamin  Vitamin D3: included in multi-vitamin  Other: none  PPI:omeprazole    EXERCISE: exercise bhaskar 3 times a week for 30 minutes    Symptoms:     Nausea/vomiting: denies   Food intolerance: denies   Constipation: smooth move tea is helping   Diarrhea: denies   Experiencing dumping: denies   Abdominal pain: denies   Reflux: a few episodes before she had her CPAP settings turned down Are you on medications: omeprazole      Current Outpatient Medications   Medication Sig Dispense Refill    acetaminophen (Tylenol) 325 mg tablet Take 2 tablets (650 mg) by mouth every 6 hours.      cyclobenzaprine (Flexeril) 5 mg tablet Take 1 tablet (5 mg) by mouth 3 times a day as needed for muscle spasms. 15 tablet 1    losartan (Cozaar) 25 mg tablet Take 1 tablet (25 mg) by mouth once daily. 90 tablet 0    multivitamin with minerals tablet Take 1 tablet by mouth once daily. 3[facility]20[facility]24 [facility]      ondansetron (Zofran) 4 mg tablet Take 1 tablet (4 mg) by mouth every 6 hours if needed for nausea or vomiting. 15 tablet 1    SUMAtriptan (Imitrex) 100 mg tablet Take by mouth.      levothyroxine (Synthroid, Levoxyl) 112 mcg tablet Take 1 tablet (112 mcg) by mouth early in the morning.. Take on an empty stomach at the same time each day, either 30 to 60 minutes prior to breakfast 30 tablet 1    omeprazole (PriLOSEC) 40 mg DR capsule Take 1 capsule (40 mg) by mouth once daily in the morning. Take before meals. Do not crush or chew. Open capsule, sprinkle beads on SF jello, pudding or applesauce. 30 capsule 5     No current facility-administered medications for this visit.       Comorbidities:  Primary hypertension  Has skipped a few doses and her BP is great. Wants to try decreasing dose. Will start her on 1/2 tab today. She has follow up in < 2 mo with new pcp    Obstructive sleep apnea (adult) (pediatric)  Already on lower settings on CPAP.         REVIEW OF SYSTEMS:  CONSTITUTIONAL: Patient denies fevers, chills, sweats and weight changes.  EYES: Patient denies any visual symptoms.  EARS, NOSE, AND THROAT: No difficulties with hearing. No symptoms of rhinitis or sore throat.  CARDIOVASCULAR: Patient denies chest pains, palpitations, orthopnea and paroxysmal nocturnal dyspnea.  RESPIRATORY: No dyspnea on exertion, no wheezing or cough.  GI: No nausea, vomiting, diarrhea, constipation, abdominal pain, hematochezia or melena.  : No urinary hesitancy or dribbling. No nocturia or urinary frequency. No abnormal urethral discharge.  MUSCULOSKELETAL: No myalgias or arthralgias.  NEUROLOGIC: No chronic headaches, no seizures. Patient denies numbness, tingling or weakness.  PSYCHIATRIC: Patient denies problems with mood disturbance. No problems with anxiety.  ENDOCRINE: No excessive urination or excessive thirst.  DERMATOLOGIC: Patient denies any rashes or skin changes.    PHYSICAL EXAM:  BP  "118/83 (BP Location: Right arm, Patient Position: Sitting, BP Cuff Size: Large adult long)   Pulse 69   Ht 1.702 m (5' 7\")   Wt 102 kg (224 lb 11.2 oz)   SpO2 99%   BMI 35.19 kg/m²   GENERAL: Obese. No apparent distress. Pt is alert and oriented x3.  HEENT: Head is normocephalic and atraumatic. Extraocular muscles are intact. Pupils are equal, round, and reactive to light and accommodation. Nares appeared normal. Mouth is well hydrated and without lesions. Mucous membranes are moist. Posterior pharynx clear of any exudate or lesions.  NECK: Supple. No carotid bruits. No lymphadenopathy or thyromegaly.  LUNGS: Clear to auscultation.  HEART: Regular rate and rhythm without murmur.  ABDOMEN: Soft, nontender, and nondistended. Positive bowel sounds. No hepatosplenomegaly was noted.  EXTREMITIES: Without any cyanosis, clubbing, rash, lesions or edema.  NEUROLOGIC: Cranial nerves II through XII are grossly intact.  PSYCHIATRIC: Flat affect, but denies suicidal or homicidal ideations.  SKIN: No ulceration or induration present.      A/P: Normal post-OP course    No sign of infection, Doing well, tolerating diet, and Comorbidities improved: htn, ramana    Energy: Energy good    Weight loss: Steady    Recommendations: follow up sleep study, counseled on exercise, Fluid intake 60 oz/day, Protein 60 g/day, Plan your meals, Do not skip meals, Continue taking vitamins as directed., and 3 month labs    Follow up: 6 weeks    Zachary Kwok MD  Bariatric and Minimally Invasive General Surgery                "

## 2024-12-18 ENCOUNTER — APPOINTMENT (OUTPATIENT)
Dept: SURGERY | Facility: CLINIC | Age: 39
End: 2024-12-18
Payer: COMMERCIAL

## 2024-12-19 ENCOUNTER — APPOINTMENT (OUTPATIENT)
Dept: SURGERY | Facility: CLINIC | Age: 39
End: 2024-12-19
Payer: COMMERCIAL

## 2025-01-09 ENCOUNTER — APPOINTMENT (OUTPATIENT)
Dept: PRIMARY CARE | Facility: CLINIC | Age: 40
End: 2025-01-09
Payer: COMMERCIAL

## 2025-01-09 VITALS
WEIGHT: 207.6 LBS | RESPIRATION RATE: 18 BRPM | OXYGEN SATURATION: 97 % | BODY MASS INDEX: 32.58 KG/M2 | SYSTOLIC BLOOD PRESSURE: 96 MMHG | TEMPERATURE: 97.9 F | DIASTOLIC BLOOD PRESSURE: 60 MMHG | HEIGHT: 67 IN | HEART RATE: 69 BPM

## 2025-01-09 DIAGNOSIS — I10 PRIMARY HYPERTENSION: ICD-10-CM

## 2025-01-09 DIAGNOSIS — E03.9 HYPOTHYROIDISM, ADULT: ICD-10-CM

## 2025-01-09 DIAGNOSIS — E06.3 HASHIMOTO'S THYROIDITIS: ICD-10-CM

## 2025-01-09 DIAGNOSIS — G43.009 MIGRAINE WITHOUT AURA AND WITHOUT STATUS MIGRAINOSUS, NOT INTRACTABLE: Primary | ICD-10-CM

## 2025-01-09 PROBLEM — R05.2 SUBACUTE COUGH: Status: RESOLVED | Noted: 2024-04-15 | Resolved: 2025-01-09

## 2025-01-09 PROBLEM — J06.9 UPPER RESPIRATORY TRACT INFECTION: Status: RESOLVED | Noted: 2024-04-15 | Resolved: 2025-01-09

## 2025-01-09 PROBLEM — R51.9 DAILY HEADACHE: Status: RESOLVED | Noted: 2023-03-24 | Resolved: 2025-01-09

## 2025-01-09 PROBLEM — Z86.69 HISTORY OF MIGRAINE: Status: RESOLVED | Noted: 2024-04-15 | Resolved: 2025-01-09

## 2025-01-09 PROBLEM — Z98.84 S/P LAPAROSCOPIC SLEEVE GASTRECTOMY: Status: RESOLVED | Noted: 2024-04-19 | Resolved: 2025-01-09

## 2025-01-09 PROBLEM — R21 RASH AND OTHER NONSPECIFIC SKIN ERUPTION: Status: RESOLVED | Noted: 2023-07-14 | Resolved: 2025-01-09

## 2025-01-09 PROBLEM — R07.81 RIB PAIN: Status: RESOLVED | Noted: 2024-04-15 | Resolved: 2025-01-09

## 2025-01-09 PROBLEM — K12.1 MOUTH ULCERATION: Status: RESOLVED | Noted: 2024-04-15 | Resolved: 2025-01-09

## 2025-01-09 PROBLEM — N23 RENAL PAIN: Status: RESOLVED | Noted: 2024-04-15 | Resolved: 2025-01-09

## 2025-01-09 PROBLEM — R51.9 HEADACHE: Status: RESOLVED | Noted: 2024-04-15 | Resolved: 2025-01-09

## 2025-01-09 PROBLEM — R63.5 WEIGHT GAIN: Status: RESOLVED | Noted: 2022-04-13 | Resolved: 2025-01-09

## 2025-01-09 PROBLEM — N64.4 PAIN OF LEFT BREAST: Status: RESOLVED | Noted: 2024-04-15 | Resolved: 2025-01-09

## 2025-01-09 PROBLEM — G43.909 MIGRAINE: Status: RESOLVED | Noted: 2023-03-24 | Resolved: 2025-01-09

## 2025-01-09 PROBLEM — F41.9 ANXIETY: Status: RESOLVED | Noted: 2023-03-24 | Resolved: 2025-01-09

## 2025-01-09 PROBLEM — F41.8 DEPRESSION WITH ANXIETY: Status: RESOLVED | Noted: 2023-03-24 | Resolved: 2025-01-09

## 2025-01-09 PROBLEM — J03.90 ACUTE TONSILLITIS: Status: RESOLVED | Noted: 2024-04-15 | Resolved: 2025-01-09

## 2025-01-09 PROBLEM — M12.9 ARTHROPATHY: Status: RESOLVED | Noted: 2023-03-24 | Resolved: 2025-01-09

## 2025-01-09 PROBLEM — F43.9 STRESS: Status: RESOLVED | Noted: 2022-04-13 | Resolved: 2025-01-09

## 2025-01-09 PROBLEM — M25.50 ARTHRALGIA: Status: RESOLVED | Noted: 2022-04-13 | Resolved: 2025-01-09

## 2025-01-09 PROCEDURE — 1036F TOBACCO NON-USER: CPT | Performed by: NURSE PRACTITIONER

## 2025-01-09 PROCEDURE — 99214 OFFICE O/P EST MOD 30 MIN: CPT | Performed by: NURSE PRACTITIONER

## 2025-01-09 PROCEDURE — 3074F SYST BP LT 130 MM HG: CPT | Performed by: NURSE PRACTITIONER

## 2025-01-09 PROCEDURE — 3008F BODY MASS INDEX DOCD: CPT | Performed by: NURSE PRACTITIONER

## 2025-01-09 PROCEDURE — 3078F DIAST BP <80 MM HG: CPT | Performed by: NURSE PRACTITIONER

## 2025-01-09 RX ORDER — SUMATRIPTAN SUCCINATE 100 MG/1
100 TABLET ORAL ONCE AS NEEDED
Qty: 9 TABLET | Refills: 3 | Status: SHIPPED | OUTPATIENT
Start: 2025-01-09 | End: 2026-01-09

## 2025-01-09 ASSESSMENT — ENCOUNTER SYMPTOMS
DYSURIA: 0
DIZZINESS: 0
ACTIVITY CHANGE: 0
SORE THROAT: 0
BLOOD IN STOOL: 0
BACK PAIN: 0
NERVOUS/ANXIOUS: 0
COUGH: 0
EYE DISCHARGE: 0
WHEEZING: 0
SHORTNESS OF BREATH: 0
DIARRHEA: 0
BRUISES/BLEEDS EASILY: 0
HEADACHES: 0
TREMORS: 0
ABDOMINAL PAIN: 0
APPETITE CHANGE: 0
SINUS PAIN: 0
WEAKNESS: 0
PALPITATIONS: 0
ADENOPATHY: 0
JOINT SWELLING: 0
HEMATURIA: 0
ARTHRALGIAS: 0
PHOTOPHOBIA: 0
AGITATION: 0
CONSTIPATION: 0

## 2025-01-09 ASSESSMENT — PAIN SCALES - GENERAL: PAINLEVEL_OUTOF10: 0-NO PAIN

## 2025-01-09 ASSESSMENT — LIFESTYLE VARIABLES: HOW OFTEN DO YOU HAVE A DRINK CONTAINING ALCOHOL: MONTHLY OR LESS

## 2025-01-09 ASSESSMENT — PATIENT HEALTH QUESTIONNAIRE - PHQ9
SUM OF ALL RESPONSES TO PHQ9 QUESTIONS 1 AND 2: 0
2. FEELING DOWN, DEPRESSED OR HOPELESS: NOT AT ALL
1. LITTLE INTEREST OR PLEASURE IN DOING THINGS: NOT AT ALL

## 2025-01-09 NOTE — PROGRESS NOTES
"Subjective   Patient ID: Tiffanie Burdick is a 39 y.o. female who presents for Establish Care (Patient presents today to establish care and thyroid management. ).    Presents today to establish care.  Her previous provider was mary negron who is no longer providing care in the area.  She has a history of Hashimoto's thyroiditis.  She has been struggling for the last year to become therapeutic on her levothyroxine.  In October 2024 she did have gastric sleeve surgery.  Her highest weight was 265 pounds she is down to 207 pounds today.  That is a total weight loss of 58 pounds.   is her bariatric provider.  Reports that she works out an hour most days of the week.  She utilizes a treadmill, elliptical, the eye fit program, and some body weight strengthening exercises.  Reports she is able to eat much smaller portions.  She does continue to report some issue with \"food noise\".  Discussed ways to incorporate exercise and fitness into her regular day.  Encouraged her to have healthy snacks available to make it easier to make good choices.  He verbalizes understanding.  She is due for thyroid levels.  Will add those to her upcoming lab work already ordered by Dr. Kwok.  We will address results when available         Review of Systems   Constitutional:  Negative for activity change and appetite change.   HENT:  Negative for congestion, ear pain, hearing loss, sinus pain and sore throat.    Eyes:  Negative for photophobia, discharge and visual disturbance.   Respiratory:  Negative for cough, shortness of breath and wheezing.    Cardiovascular:  Negative for chest pain, palpitations and leg swelling.   Gastrointestinal:  Negative for abdominal pain, blood in stool, constipation and diarrhea.   Endocrine: Negative for cold intolerance, heat intolerance and polyuria.   Genitourinary:  Negative for dysuria and hematuria.   Musculoskeletal:  Negative for arthralgias, back pain and joint swelling.   Skin:  Negative for " "rash.   Allergic/Immunologic: Negative for environmental allergies and food allergies.   Neurological:  Negative for dizziness, tremors, syncope, weakness and headaches.   Hematological:  Negative for adenopathy. Does not bruise/bleed easily.   Psychiatric/Behavioral:  Negative for agitation and suicidal ideas. The patient is not nervous/anxious.        Objective   BP 96/60   Pulse 69   Temp 36.6 °C (97.9 °F)   Resp 18   Ht 1.702 m (5' 7\")   Wt 94.2 kg (207 lb 9.6 oz)   SpO2 97%   BMI 32.51 kg/m²     Physical Exam  Vitals reviewed.   Constitutional:       General: She is not in acute distress.     Appearance: Normal appearance. She is obese.   HENT:      Head: Normocephalic.      Right Ear: Tympanic membrane normal.      Left Ear: Tympanic membrane normal.      Nose: Nose normal.      Mouth/Throat:      Mouth: Mucous membranes are moist.   Eyes:      Conjunctiva/sclera: Conjunctivae normal.      Pupils: Pupils are equal, round, and reactive to light.   Cardiovascular:      Rate and Rhythm: Normal rate and regular rhythm.      Pulses: Normal pulses.      Heart sounds: Normal heart sounds.   Pulmonary:      Effort: Pulmonary effort is normal.      Breath sounds: Normal breath sounds.   Abdominal:      General: Bowel sounds are normal.      Palpations: Abdomen is soft.   Musculoskeletal:         General: Normal range of motion.   Skin:     General: Skin is warm and dry.      Capillary Refill: Capillary refill takes less than 2 seconds.   Neurological:      Mental Status: She is alert and oriented to person, place, and time.   Psychiatric:         Mood and Affect: Mood normal.         Speech: Speech normal.         Assessment/Plan   Problem List Items Addressed This Visit             ICD-10-CM    Hypothyroidism, adult E03.9    Relevant Orders    T3, free    Thyroid Peroxidase (TPO) Antibody    TSH with reflex to Free T4 if abnormal    Migraine without aura - Primary G43.009    Relevant Medications    SUMAtriptan " (Imitrex) 100 mg tablet     Focus on healthy eating including lean proteins and vegetables.  Avoid high carbohydrate high sugar foods and drinks.  Try to increase physical activity as tolerated.  Goal is for 160 minutes/week of physical activity.  Check blood pressure 2-3 times a week.  Call for blood pressures greater than 140/90.  Bring list of blood pressures to next visit.  Labwork ordered today.  Will address results when available

## 2025-01-09 NOTE — PATIENT INSTRUCTIONS
Focus on healthy eating including lean proteins and vegetables.  Avoid high carbohydrate high sugar foods and drinks.  Try to increase physical activity as tolerated.  Goal is for 160 minutes/week of physical activity.  Check blood pressure 2-3 times a week.  Call for blood pressures greater than 140/90.  Bring list of blood pressures to next visit.  Labwork ordered today.  Will address results when available

## 2025-01-17 ENCOUNTER — LAB (OUTPATIENT)
Dept: LAB | Facility: LAB | Age: 40
End: 2025-01-17
Payer: COMMERCIAL

## 2025-01-17 DIAGNOSIS — K91.2 INTESTINAL MALABSORPTION FOLLOWING GASTRECTOMY (HHS-HCC): ICD-10-CM

## 2025-01-17 DIAGNOSIS — D68.51 FACTOR V LEIDEN (MULTI): ICD-10-CM

## 2025-01-17 DIAGNOSIS — Z90.3 INTESTINAL MALABSORPTION FOLLOWING GASTRECTOMY (HHS-HCC): ICD-10-CM

## 2025-01-17 DIAGNOSIS — E06.3 HASHIMOTO'S THYROIDITIS: ICD-10-CM

## 2025-01-17 DIAGNOSIS — E03.9 HYPOTHYROIDISM, UNSPECIFIED TYPE: ICD-10-CM

## 2025-01-17 DIAGNOSIS — G47.33 OBSTRUCTIVE SLEEP APNEA (ADULT) (PEDIATRIC): ICD-10-CM

## 2025-01-17 DIAGNOSIS — I10 PRIMARY HYPERTENSION: ICD-10-CM

## 2025-01-17 DIAGNOSIS — Z98.84 S/P LAPAROSCOPIC SLEEVE GASTRECTOMY: ICD-10-CM

## 2025-01-17 DIAGNOSIS — E66.01 MORBID OBESITY (MULTI): ICD-10-CM

## 2025-01-17 DIAGNOSIS — E03.9 HYPOTHYROIDISM, ADULT: ICD-10-CM

## 2025-01-17 LAB
25(OH)D3 SERPL-MCNC: 37 NG/ML (ref 30–100)
ALBUMIN SERPL BCP-MCNC: 4.4 G/DL (ref 3.4–5)
ALP SERPL-CCNC: 38 U/L (ref 33–110)
ALT SERPL W P-5'-P-CCNC: 22 U/L (ref 7–45)
ANION GAP SERPL CALC-SCNC: 13 MMOL/L (ref 10–20)
AST SERPL W P-5'-P-CCNC: 16 U/L (ref 9–39)
BASOPHILS # BLD AUTO: 0.05 X10*3/UL (ref 0–0.1)
BASOPHILS NFR BLD AUTO: 0.8 %
BILIRUB SERPL-MCNC: 0.7 MG/DL (ref 0–1.2)
BUN SERPL-MCNC: 22 MG/DL (ref 6–23)
CALCIUM SERPL-MCNC: 9.3 MG/DL (ref 8.6–10.3)
CHLORIDE SERPL-SCNC: 105 MMOL/L (ref 98–107)
CO2 SERPL-SCNC: 24 MMOL/L (ref 21–32)
CREAT SERPL-MCNC: 0.8 MG/DL (ref 0.5–1.05)
EGFRCR SERPLBLD CKD-EPI 2021: >90 ML/MIN/1.73M*2
EOSINOPHIL # BLD AUTO: 0.1 X10*3/UL (ref 0–0.7)
EOSINOPHIL NFR BLD AUTO: 1.6 %
ERYTHROCYTE [DISTWIDTH] IN BLOOD BY AUTOMATED COUNT: 12.2 % (ref 11.5–14.5)
EST. AVERAGE GLUCOSE BLD GHB EST-MCNC: 82 MG/DL
FERRITIN SERPL-MCNC: 192 NG/ML (ref 8–150)
FOLATE SERPL-MCNC: 19.3 NG/ML
GLUCOSE SERPL-MCNC: 80 MG/DL (ref 74–99)
HBA1C MFR BLD: 4.5 %
HCT VFR BLD AUTO: 40.3 % (ref 36–46)
HGB BLD-MCNC: 13.4 G/DL (ref 12–16)
IMM GRANULOCYTES # BLD AUTO: 0.02 X10*3/UL (ref 0–0.7)
IMM GRANULOCYTES NFR BLD AUTO: 0.3 % (ref 0–0.9)
IRON SATN MFR SERPL: 24 % (ref 25–45)
IRON SERPL-MCNC: 60 UG/DL (ref 35–150)
LYMPHOCYTES # BLD AUTO: 2 X10*3/UL (ref 1.2–4.8)
LYMPHOCYTES NFR BLD AUTO: 31 %
MCH RBC QN AUTO: 31.1 PG (ref 26–34)
MCHC RBC AUTO-ENTMCNC: 33.3 G/DL (ref 32–36)
MCV RBC AUTO: 94 FL (ref 80–100)
MONOCYTES # BLD AUTO: 0.51 X10*3/UL (ref 0.1–1)
MONOCYTES NFR BLD AUTO: 7.9 %
NEUTROPHILS # BLD AUTO: 3.77 X10*3/UL (ref 1.2–7.7)
NEUTROPHILS NFR BLD AUTO: 58.4 %
NRBC BLD-RTO: 0 /100 WBCS (ref 0–0)
PLATELET # BLD AUTO: 249 X10*3/UL (ref 150–450)
POTASSIUM SERPL-SCNC: 4.2 MMOL/L (ref 3.5–5.3)
PROT SERPL-MCNC: 6.4 G/DL (ref 6.4–8.2)
PTH-INTACT SERPL-MCNC: 38.9 PG/ML (ref 18.5–88)
RBC # BLD AUTO: 4.31 X10*6/UL (ref 4–5.2)
SODIUM SERPL-SCNC: 138 MMOL/L (ref 136–145)
T3FREE SERPL-MCNC: 3 PG/ML (ref 2.3–4.2)
THYROPEROXIDASE AB SERPL-ACNC: 170 IU/ML
TIBC SERPL-MCNC: 247 UG/DL (ref 240–445)
TSH SERPL-ACNC: 2.63 MIU/L (ref 0.44–3.98)
UIBC SERPL-MCNC: 187 UG/DL (ref 110–370)
VIT B12 SERPL-MCNC: 540 PG/ML (ref 211–911)
WBC # BLD AUTO: 6.5 X10*3/UL (ref 4.4–11.3)

## 2025-01-17 PROCEDURE — 83036 HEMOGLOBIN GLYCOSYLATED A1C: CPT

## 2025-01-17 PROCEDURE — 84481 FREE ASSAY (FT-3): CPT

## 2025-01-17 PROCEDURE — 82728 ASSAY OF FERRITIN: CPT

## 2025-01-17 PROCEDURE — 86376 MICROSOMAL ANTIBODY EACH: CPT

## 2025-01-17 PROCEDURE — 84425 ASSAY OF VITAMIN B-1: CPT

## 2025-01-17 PROCEDURE — 84443 ASSAY THYROID STIM HORMONE: CPT

## 2025-01-17 PROCEDURE — 80053 COMPREHEN METABOLIC PANEL: CPT

## 2025-01-17 PROCEDURE — 83550 IRON BINDING TEST: CPT

## 2025-01-17 PROCEDURE — 83970 ASSAY OF PARATHORMONE: CPT

## 2025-01-17 PROCEDURE — 82607 VITAMIN B-12: CPT

## 2025-01-17 PROCEDURE — 82746 ASSAY OF FOLIC ACID SERUM: CPT

## 2025-01-17 PROCEDURE — 36415 COLL VENOUS BLD VENIPUNCTURE: CPT

## 2025-01-17 PROCEDURE — 82306 VITAMIN D 25 HYDROXY: CPT

## 2025-01-17 PROCEDURE — 85025 COMPLETE CBC W/AUTO DIFF WBC: CPT

## 2025-01-17 PROCEDURE — 83540 ASSAY OF IRON: CPT

## 2025-01-17 RX ORDER — LEVOTHYROXINE SODIUM 112 UG/1
112 TABLET ORAL DAILY
Qty: 90 TABLET | Refills: 1 | Status: SHIPPED | OUTPATIENT
Start: 2025-01-17 | End: 2025-07-16

## 2025-01-21 LAB — VIT B1 PYROPHOSHATE BLD-SCNC: 75 NMOL/L (ref 70–180)

## 2025-04-30 NOTE — PROGRESS NOTES
[facility] Sleep Medicine  [facility] ONE  [facility] ONE  [address] [place] [address]     [facility]  Follow Up Visit Note      Subjective   Patient ID: Tiffanie Burdick is a 40 y.o. female with past medical history significant for Morbid Obesity, Depression. Anxiety, Headache, and Sleep disorder breathing.      5/9/2025: UPDATED : The patient is here {[id number]} and presents for follow-up of HOSSEIN on PAP 6 months after bariatric surgery. Her ESS is   today     10/18/24: The patient called and stated that her pressure was too intense, adjusted to 4-12 CWP via cloud.     9/6/24: The patient returned to the clinic to review her initial pap compliance before Bariatric Surgery. Her over 4 hours pap compliance rate was 93  %, and Her ESS  is 4  today. She reports having a mask leaking when she sleeps on her side. I provided a small DreamWear nasal pillow small size to try, and she reported being comfortable with it. The pre-Bariatric Surgery instruction has been discussed and provided; the patient verbalized understanding of it.     7/19/24: The patient had a virtual visit with me to review her sleep study to treat her sleep apnea before Bariatric Surgery. The desensitization strategy, 30-day free mask return policy, and insurance requirements are all discussed with the patient. The patient verbalized understanding it. Her ESS is 3 today.     4/19/2024: The patient is here alone today and was referred by bariatrics Zachary Kwok MD, for a comprehensive sleep medicine evaluation due to snoring and daytime sleepiness and a preoperative risk evaluation before bariatric surgery. She reports her general sleepiness and thinks that was from her six kids make her tired. She also has a family history of HOSSEIN. Today, she came here to establish care before bariatric surgery. ESS is 13, TRUPTI is 15, and the neck circumference is 16 inches today.      HPI  The patient had had these symptoms for at least one year.   The patient has never had a sleep study done yet.      SLEEP STUDY HISTORY: (personally reviewed raw data such as interpretation report, data sheet, hypnogram, and titration table if available and applicable)  5/22/24: Home Sleep Study: AHI 3%: 13.5/hr, Supine AHI 3%: 14.6%, AHI 4%:8.1/hr, Supine AHI 4%: 8.8/hr, Ajith: 82.6%, <88%: 3.7 minutes     SLEEP-WAKE SCHEDULE (after cpap)  Bedtime: 10 PM on weekdays, 10:30 PM  on weekends  Subjective sleep latency: 10 minutes  Problems falling asleep: No  Number of awakenings: 2 times per night spontaneously for unknown reasons  Falls back asleep in 5 minutes  Problems staying asleep: No  Final wake time: 6:30 AM on weekdays, 7:30 AM on weekends  Out of bed time: 6:35 AM on weekdays, 7:30 AM on weekends  Shift work: No  Naps: No  Average sleep duration (excluding naps): 8 hours     SLEEP ENVIRONMENT  Sleep location: bed  Sleep status: sleeps with   Room is dark:  Yes  Room is quiet: Yes  Room is cool: Yes  Bed comfort: good     SLEEP HABITS:   Activities before bedtime: read a book  Activities in bed: read a book  Preferred sleep position: back and side     SLEEP ROS: (after CPAP)  Night symptoms: Denies for snoring  Morning symptoms: Denies for unrefreshing sleep, morning headache  Daytime symptoms Denies for excessive daytime sleepiness and fatigue  Hypersomnia / narcolepsy symptoms: Patient denies symptoms of a hypersomnolence disorder such as sleep paralysis, sleep-related hallucinations, recurrent sleep attacks, automatic behaviors, and cataplexy.   RLS symptoms: Patient denies RLS symptoms.  Movements in sleep: Patient denies problematic movements in sleep.  Parasomnia symptoms: Patient denies symptoms of parasomnia.     WEIGHT: gained 20 lbs in 6 months      REVIEW OF SYSTEMS: All other systems have been reviewed and are negative.     PERTINENT SOCIAL HISTORY:  Occupation: [employer]  Smoking: No   ETOH: Yes, socialally  Marijuana: No   Caffeine: Yes  Sleep aids: No   Claustrophobia: No      PERTINENT PAST SURGICAL HISTORY:  non-contributory     PERTINENT FAMILY HISTORY:  sleep apnea- father, sister     Active Problems, Allergy List, Medication List, and PMH/PSH/FH/Social Hx have been reviewed and reconciled in chart. No significant changes unless documented in the pertinent chart section. Updates made when necessary.     REVIEW OF SYSTEMS  All other systems have been reviewed and are negative.      ALLERGIES  Allergies[1]    MEDICATIONS  Current Medications[2]          Objective       Physical Exam  Constitutional: Awake, not in distress  Lungs: Clear to auscultation bilateral, no rales  Heart: Regular rate and rhythm, no murmurs  Skin: Warm, no rash  Neuro: No tremors, moves all extremities  Psych: alert and oriented to time, place, and person    Assessment/Plan   Tiffanie Burdick is a 40 y.o. female presents today in [facility] with the following problems:    # OBSTRUCTIVE SLEEP APNEA:   -Great compliance, continue 4-12  cmH20 auto PAP and order annual supplies through [email or web address].   -Sleep apnea and PAP therapy education were provided at length in the clinic today. Tiffanie Burdick verbalized understanding.  -Emphasized diet, exercise, and weight loss in the clinic, as were non-supine sleep, avoiding alcohol in the late evening, and driving or operating heavy machinery when sleepy.  Tiffanie Gennaroverbalizes understanding of the above instructions and risks.      # CHRONIC SLEEP MAINTENANCE INSOMNIA:  -Improved after using cpap     # DEPRESSION :   -Tiffanie Patterson not taking any medication and doing well.  -Denies HI/SI      # HYPERTENSION:  -Blood pressure was 132/85 today.  -Denies headache, palpitation, and syncope in the clinic.  -Follows with PCP/ Cardiology     # MORBID OBESITY:  -with a BMI of  41.5, the last Bicarbonate was 27             Bicarbonate   Date Value Ref Range Status   02/23/2024 27 21 - 32 mmol/L Final   -Encourage to have regular exercise to manage weight well.        RTC 6 months after Bariatric Surgery       All of patient's questions were answered. She verbalizes understanding and agreement with my assessment and plan.    Please excuse any errors in grammar or translation related to dictation.           [1]   Allergies  Allergen Reactions    Amitriptyline Confusion    Ampicillin Hives    Reglan [Metoclopramide Hcl] Confusion   [2]   Current Outpatient Medications   Medication Sig Dispense Refill    acetaminophen (Tylenol) 325 mg tablet Take 2 tablets (650 mg) by mouth every 6 hours.      CALCIUM CITRATE ORAL Take by mouth 3 times a day.      cyclobenzaprine (Flexeril) 5 mg tablet Take 1 tablet (5 mg) by mouth 3 times a day as needed for muscle spasms. 15 tablet 1    levothyroxine (Synthroid, Levoxyl) 112 mcg tablet Take 1 tablet (112 mcg) by mouth early in the morning.. Take on an empty stomach at the same time each day, either 30 to 60 minutes prior to breakfast 90 tablet 1    losartan (Cozaar) 25 mg tablet Take 0.5 tablets (12.5 mg) by mouth once daily.      multivitamin with minerals tablet Take 1 tablet by mouth once daily. 3/20/24 VERBALIZE STARTED TAKING RECENTLY [facility]      omeprazole (PriLOSEC) 40 mg DR capsule Take 1 capsule (40 mg) by mouth once daily in the morning. Take before meals. Do not crush or chew. Open capsule, sprinkle beads on SF jello, pudding or applesauce. 30 capsule 5    ondansetron (Zofran) 4 mg tablet Take 1 tablet (4 mg) by mouth every 6 hours if needed for nausea or vomiting. 15 tablet 1    SUMAtriptan (Imitrex) 100 mg tablet Take 1 tablet (100 mg) by mouth 1 time if needed for migraine. 9 tablet 3     No current facility-administered medications for this visit.

## 2025-05-09 ENCOUNTER — APPOINTMENT (OUTPATIENT)
Dept: SLEEP MEDICINE | Facility: CLINIC | Age: 40
End: 2025-05-09
Payer: COMMERCIAL

## 2025-05-12 ENCOUNTER — TELEPHONE (OUTPATIENT)
Dept: PRIMARY CARE | Facility: CLINIC | Age: 40
End: 2025-05-12
Payer: COMMERCIAL

## 2025-05-12 NOTE — TELEPHONE ENCOUNTER
Pt said she is passing a kidney stone  wanted to know if there was something she could take to help with the pain besides Tylenol.  Pt called back today said she went to ER and it was going to take hours to be seen.  Told her we had an appt on Thur she said she would go to [facility] or [facility].

## 2025-07-17 DIAGNOSIS — E06.3 HASHIMOTO'S THYROIDITIS: ICD-10-CM

## 2025-07-17 RX ORDER — CIPROFLOXACIN HYDROCHLORIDE 3 MG/ML
SOLUTION/ DROPS OPHTHALMIC
COMMUNITY
Start: 2025-07-08

## 2025-07-17 RX ORDER — LEVOTHYROXINE SODIUM 112 UG/1
TABLET ORAL
Qty: 90 TABLET | Refills: 1 | Status: SHIPPED | OUTPATIENT
Start: 2025-07-17

## 2025-07-19 NOTE — PROGRESS NOTES
[facility] Sleep Medicine  [facility] ONE  [facility] ONE  [address] [place] [address]     [facility] Sleep Medicine Clinic  Follow Up Visit Note      Subjective   Patient ID: Tiffanie Burdick is a 40 y.o. female with past medical history significant for Morbid Obesity, Depression. Anxiety, Headache, and OBSTRUCTIVE SLEEP APNEA .      8/1/2025: UPDATED: The patient is here alone today and presents for follow-up of HOSSEIN on PAP to reevaluate PAP compliance, after Bariatric Surgery on Oct/1/2024.  She lost 80 lbs after Bariatric Surgery. Today, she came to the clinic to check if she needs to use her pap anymore. It is because after losing weight, her previous pressure setting was too intense to use, and woke her up during the night. I adjusted her PAP pressure setting to 4-8 cmH2O on the cloud and will perform another HSAT to update her AHI. Her ESS  is 3  today.        9/6/24: The patient returned to the clinic to review her initial pap compliance before Bariatric Surgery. Her over 4 hours pap compliance rate was 93  %, and Her ESS  is 4  today. She reports having a mask leaking when she sleeps on her side. I provided a small [employer] nasal pillow small size to try, and she reported being comfortable with it. The pre-Bariatric Surgery instruction has been discussed and provided; the patient verbalized understanding of it.     7/19/24: The patient had a virtual visit with me to review her sleep study to treat her sleep apnea before Bariatric Surgery. The desensitization strategy, 30-day free mask return policy, and insurance requirements are all discussed with the patient. The patient verbalized understanding it. Her ESS is 3 today.     4/19/2024: The patient is here alone today and was referred by bariatrics Zachary Kwok MD, for a comprehensive sleep medicine evaluation due to snoring and daytime sleepiness and a preoperative risk evaluation before bariatric  surgery. She reports her general sleepiness and thinks that was from her six kids make her tired. She also has a family history of HOSSEIN. Today, she came here to establish care before bariatric surgery. ESS is 13, TRUPTI is 15, and the neck circumference is 16 inches today.     HPI  The patient had had these symptoms for at least one year.      SLEEP STUDY HISTORY: (personally reviewed raw data such as interpretation report, data sheet, hypnogram, and titration table if available and applicable)  5/22/24: Home Sleep Study: AHI 3%: 13.5/hr, Supine AHI 3%: 14.6%, AHI 4%:8.1/hr, Supine AHI 4%: 8.8/hr, Ajith: 82.6%, <88%: 3.7 minutes     SLEEP-WAKE SCHEDULE (after cpap)  Bedtime: 10 PM on weekdays, 10:30 PM  on weekends  Subjective sleep latency: 10 minutes  Problems falling asleep: No  Number of awakenings: 2 times per night spontaneously for unknown reasons  Falls back asleep in 5 minutes  Problems staying asleep: No  Final wake time: 6:30 AM on weekdays, 7:30 AM on weekends  Out of bed time: 6:35 AM on weekdays, 7:30 AM on weekends  Shift work: No  Naps: No  Average sleep duration (excluding naps): 8 hours     SLEEP ENVIRONMENT  Sleep location: bed  Sleep status: sleeps with   Room is dark:  Yes  Room is quiet: Yes  Room is cool: Yes  Bed comfort: good     SLEEP HABITS:   Activities before bedtime: read a book  Activities in bed: read a book  Preferred sleep position: back and side     SLEEP ROS: (after CPAP)  Night symptoms: Denies for snoring  Morning symptoms: Denies for unrefreshing sleep, morning headache  Daytime symptoms Denies for excessive daytime sleepiness and fatigue  Hypersomnia / narcolepsy symptoms: Patient denies symptoms of a hypersomnolence disorder such as sleep paralysis, sleep-related hallucinations, recurrent sleep attacks, automatic behaviors, and cataplexy.   RLS symptoms: Patient denies RLS symptoms.  Movements in sleep: Patient denies problematic movements in sleep.  Parasomnia symptoms:  Patient denies symptoms of parasomnia.     WEIGHT: lost 80 lbs after Bariatric Surgery      REVIEW OF SYSTEMS: All other systems have been reviewed and are negative.     PERTINENT SOCIAL HISTORY:  Occupation: [employer]  Smoking: No   ETOH: Yes, socialally  Marijuana: No   Caffeine: Yes  Sleep aids: No   Claustrophobia: No      PERTINENT PAST SURGICAL HISTORY:  non-contributory     PERTINENT FAMILY HISTORY:  sleep apnea- father, sister     Active Problems, Allergy List, Medication List, and PMH/PSH/FH/Social Hx have been reviewed and reconciled in chart. No significant changes unless documented in the pertinent chart section. Updates made when necessary.   REVIEW OF SYSTEMS  All other systems have been reviewed and are negative.      ALLERGIES  Allergies[1]    MEDICATIONS  Current Medications[2]    Objective     Vitals:    08/01/25 0759   BP: 108/74   Pulse: 87   Resp: 16   SpO2: 98%    .    Physical Exam  Constitutional: Awake, not in distress  Lungs: Clear to auscultation bilateral, no rales  Heart: Regular rate and rhythm, no murmurs  Skin: Warm, no rash  Neuro: No tremors, moves all extremities  Psych: alert and oriented to time, place, and person    PAP Adherence:  DURABLE MEDICAL EQUIPMENT COMPANY: [employer]  Machine: THERAPY: RESMED AIRSENSE 11 PRESSURE SETTINGS: 4 cm H2O - 12 cm H2O  Mask: AirFit N30i Nasal CPAP Mask Cushion, Small  Issues with therapy: ISSUES WITH THERAPY: Sensation of too much pressure  Benefits with PAP: PERCEIVED BENEFITS OF PAP: refreshing sleep decreased or no snoring better sleep quality    A PAP adherence download was obtained and data was reviewed personally today in clinic. (see scanned document in EPIC)        Assessment/Plan   Tiffanie Burdick is a 40 y.o. female presents today in [facility] with the following problems:    # OBSTRUCTIVE SLEEP APNEA:   -Proceed with another Home Sleep Study to check her OBSTRUCTIVE SLEEP APNEA,  and see if the pap still needed.   -Poor compliance due to too intense pressure, adjusted 4-8 cmH20 auto PAP via the [employer].    -Sleep apnea and PAP therapy education were provided at length in the clinic today. Tiffanie Burdick verbalized understanding.  -Emphasized diet, exercise, and weight loss in the clinic, as were non-supine sleep, avoiding alcohol in the late evening, and driving or operating heavy machinery when sleepy.  -Tiffanie Burdickverbalizes understanding of the above instructions and risks.      # CHRONIC SLEEP MAINTENANCE INSOMNIA:  -Improved after using cpap     # DEPRESSION :   -Tiffanie Patterson not taking any medication and doing well.  -Denies HI/SI      # HYPERTENSION:  -Blood pressure is 108/74 today.  -Denies headache, palpitation, and syncope in the clinic.  -Follows with PCP/ Cardiology     # Overweight:   -with a BMI of  29.73. the last Bicarbonate was 24 in Jan/2025  -Encourage to have regular exercise to manage weight well.  -Lost 80 lbs after Bariatric surgery.       Follow up 3 months to review Home Sleep Study     All of patient's questions were answered. She verbalizes understanding and agreement with my assessment and plan.    Please excuse any errors in grammar or translation related to dictation.           [1]   Allergies  Allergen Reactions    Amitriptyline Confusion    Ampicillin Hives    Reglan [Metoclopramide Hcl] Confusion   [2]   Current Outpatient Medications   Medication Sig Dispense Refill    acetaminophen (Tylenol) 325 mg tablet Take 2 tablets (650 mg) by mouth every 6 hours.      CALCIUM CITRATE ORAL Take by mouth 3 times a day.      ciprofloxacin (Ciloxan) 0.3 % ophthalmic solution INSTILL 4 DROPS INTO LEFT EAR TWICE DAILY FOR 7 DAYS      cyclobenzaprine (Flexeril) 5 mg tablet Take 1 tablet (5 mg) by mouth 3 times a day as needed for muscle spasms. 15 tablet 1    levothyroxine (Synthroid, Levoxyl) 112 mcg tablet TAKE 1 TABLET BY MOUTH ON AN EMPTY  STOMACH IN THE MORNING AT THE SAME TIME EACH DAY EITHER 30-60 MINUTES BEFORE BREAKFAST 90 tablet 1    losartan (Cozaar) 25 mg tablet Take 0.5 tablets (12.5 mg) by mouth once daily.      multivitamin with minerals tablet Take 1 tablet by mouth once daily. 3/20/24 VERBALIZE STARTED TAKING [facility]ENTLY [facility]      omeprazole (PriLOSEC) 40 mg DR capsule Take 1 capsule (40 mg) by mouth once daily in the morning. Take before meals. Do not crush or chew. Open capsule, sprinkle beads on SF jello, pudding or applesauce. 30 capsule 5    ondansetron (Zofran) 4 mg tablet Take 1 tablet (4 mg) by mouth every 6 hours if needed for nausea or vomiting. 15 tablet 1    SUMAtriptan (Imitrex) 100 mg tablet Take 1 tablet (100 mg) by mouth 1 time if needed for migraine. 9 tablet 3     No current facility-administered medications for this visit.

## 2025-08-01 ENCOUNTER — OFFICE VISIT (OUTPATIENT)
Dept: PRIMARY CARE | Facility: CLINIC | Age: 40
End: 2025-08-01
Payer: COMMERCIAL

## 2025-08-01 ENCOUNTER — OFFICE VISIT (OUTPATIENT)
Dept: SLEEP MEDICINE | Facility: CLINIC | Age: 40
End: 2025-08-01
Payer: COMMERCIAL

## 2025-08-01 VITALS
BODY MASS INDEX: 30.1 KG/M2 | DIASTOLIC BLOOD PRESSURE: 76 MMHG | HEART RATE: 90 BPM | TEMPERATURE: 98.4 F | WEIGHT: 191.8 LBS | HEIGHT: 67 IN | SYSTOLIC BLOOD PRESSURE: 130 MMHG | OXYGEN SATURATION: 97 %

## 2025-08-01 VITALS
HEART RATE: 87 BPM | BODY MASS INDEX: 29.73 KG/M2 | WEIGHT: 189.8 LBS | SYSTOLIC BLOOD PRESSURE: 108 MMHG | OXYGEN SATURATION: 98 % | RESPIRATION RATE: 16 BRPM | DIASTOLIC BLOOD PRESSURE: 74 MMHG

## 2025-08-01 DIAGNOSIS — I10 BENIGN ESSENTIAL HYPERTENSION: ICD-10-CM

## 2025-08-01 DIAGNOSIS — N20.0 KIDNEY STONE: Primary | ICD-10-CM

## 2025-08-01 DIAGNOSIS — R30.0 DYSURIA: ICD-10-CM

## 2025-08-01 DIAGNOSIS — Z90.3 INTESTINAL MALABSORPTION FOLLOWING GASTRECTOMY (HHS-HCC): ICD-10-CM

## 2025-08-01 DIAGNOSIS — F41.9 ANXIETY: ICD-10-CM

## 2025-08-01 DIAGNOSIS — K91.2 INTESTINAL MALABSORPTION FOLLOWING GASTRECTOMY (HHS-HCC): ICD-10-CM

## 2025-08-01 DIAGNOSIS — D68.2 HEREDITARY DEFICIENCY OF OTHER CLOTTING FACTORS: ICD-10-CM

## 2025-08-01 DIAGNOSIS — Z98.84 BARIATRIC SURGERY STATUS: ICD-10-CM

## 2025-08-01 DIAGNOSIS — I10 PRIMARY HYPERTENSION: ICD-10-CM

## 2025-08-01 DIAGNOSIS — E66.3 OVERWEIGHT (BMI 25.0-29.9): ICD-10-CM

## 2025-08-01 DIAGNOSIS — Z13.21 ENCOUNTER FOR VITAMIN DEFICIENCY SCREENING: ICD-10-CM

## 2025-08-01 DIAGNOSIS — R07.9 CHEST PAIN, UNSPECIFIED TYPE: ICD-10-CM

## 2025-08-01 DIAGNOSIS — G47.33 OSA (OBSTRUCTIVE SLEEP APNEA): Primary | ICD-10-CM

## 2025-08-01 DIAGNOSIS — D72.829 LEUKOCYTOSIS, UNSPECIFIED TYPE: ICD-10-CM

## 2025-08-01 DIAGNOSIS — D68.51 FACTOR V LEIDEN (MULTI): ICD-10-CM

## 2025-08-01 PROBLEM — E66.01 MORBID OBESITY (MULTI): Status: RESOLVED | Noted: 2024-04-03 | Resolved: 2025-08-01

## 2025-08-01 PROBLEM — E66.01 MORBID OBESITY DUE TO EXCESS CALORIES (MULTI): Status: RESOLVED | Noted: 2024-10-01 | Resolved: 2025-08-01

## 2025-08-01 LAB
POC APPEARANCE, URINE: CLEAR
POC BILIRUBIN, URINE: NEGATIVE
POC BLOOD, URINE: ABNORMAL
POC COLOR, URINE: YELLOW
POC GLUCOSE, URINE: NEGATIVE MG/DL
POC KETONES, URINE: NEGATIVE MG/DL
POC LEUKOCYTES, URINE: ABNORMAL
POC NITRITE,URINE: NEGATIVE
POC PH, URINE: 6.5 PH
POC PROTEIN, URINE: NEGATIVE MG/DL
POC SPECIFIC GRAVITY, URINE: 1.02
POC UROBILINOGEN, URINE: 0.2 EU/DL

## 2025-08-01 PROCEDURE — 99214 OFFICE O/P EST MOD 30 MIN: CPT | Performed by: NURSE PRACTITIONER

## 2025-08-01 PROCEDURE — 3078F DIAST BP <80 MM HG: CPT | Performed by: NURSE PRACTITIONER

## 2025-08-01 PROCEDURE — 3078F DIAST BP <80 MM HG: CPT

## 2025-08-01 PROCEDURE — 1036F TOBACCO NON-USER: CPT

## 2025-08-01 PROCEDURE — 3075F SYST BP GE 130 - 139MM HG: CPT | Performed by: NURSE PRACTITIONER

## 2025-08-01 PROCEDURE — 3074F SYST BP LT 130 MM HG: CPT

## 2025-08-01 PROCEDURE — 99213 OFFICE O/P EST LOW 20 MIN: CPT

## 2025-08-01 PROCEDURE — 3008F BODY MASS INDEX DOCD: CPT | Performed by: NURSE PRACTITIONER

## 2025-08-01 PROCEDURE — 1036F TOBACCO NON-USER: CPT | Performed by: NURSE PRACTITIONER

## 2025-08-01 PROCEDURE — 81003 URINALYSIS AUTO W/O SCOPE: CPT | Performed by: NURSE PRACTITIONER

## 2025-08-01 RX ORDER — LOSARTAN POTASSIUM 25 MG/1
25 TABLET ORAL DAILY
Qty: 90 TABLET | Refills: 3 | Status: SHIPPED | OUTPATIENT
Start: 2025-08-01 | End: 2026-08-01

## 2025-08-01 RX ORDER — TAMSULOSIN HYDROCHLORIDE 0.4 MG/1
0.4 CAPSULE ORAL DAILY
Qty: 30 CAPSULE | Refills: 0 | Status: SHIPPED | OUTPATIENT
Start: 2025-08-01 | End: 2026-08-01

## 2025-08-01 RX ORDER — ESCITALOPRAM OXALATE 5 MG/1
5 TABLET ORAL DAILY
Qty: 30 TABLET | Refills: 0 | Status: SHIPPED | OUTPATIENT
Start: 2025-08-01 | End: 2025-10-30

## 2025-08-01 SDOH — ECONOMIC STABILITY: FOOD INSECURITY: WITHIN THE PAST 12 MONTHS, THE FOOD YOU BOUGHT JUST DIDN'T LAST AND YOU DIDN'T HAVE MONEY TO GET MORE.: NEVER TRUE

## 2025-08-01 SDOH — ECONOMIC STABILITY: FOOD INSECURITY: WITHIN THE PAST 12 MONTHS, YOU WORRIED THAT YOUR FOOD WOULD RUN OUT BEFORE YOU GOT MONEY TO BUY MORE.: NEVER TRUE

## 2025-08-01 ASSESSMENT — ENCOUNTER SYMPTOMS
ABDOMINAL PAIN: 0
SHORTNESS OF BREATH: 0
BRUISES/BLEEDS EASILY: 0
HEMATURIA: 0
CONSTIPATION: 0
WEAKNESS: 0
ADENOPATHY: 0
BLOOD IN STOOL: 0
DEPRESSION: 0
BACK PAIN: 0
TREMORS: 0
DYSURIA: 0
ACTIVITY CHANGE: 0
DEPRESSION: 0
SORE THROAT: 0
LOSS OF SENSATION IN FEET: 0
DIARRHEA: 0
APPETITE CHANGE: 0
SINUS PAIN: 0
EYE DISCHARGE: 0
COUGH: 0
HEADACHES: 0
OCCASIONAL FEELINGS OF UNSTEADINESS: 0
ARTHRALGIAS: 0
JOINT SWELLING: 0
PHOTOPHOBIA: 0
NERVOUS/ANXIOUS: 0
DIZZINESS: 0
AGITATION: 0
PALPITATIONS: 0
WHEEZING: 0

## 2025-08-01 ASSESSMENT — PATIENT HEALTH QUESTIONNAIRE - PHQ9
1. LITTLE INTEREST OR PLEASURE IN DOING THINGS: NOT AT ALL
SUM OF ALL RESPONSES TO PHQ9 QUESTIONS 1 AND 2: 0
1. LITTLE INTEREST OR PLEASURE IN DOING THINGS: NOT AT ALL
2. FEELING DOWN, DEPRESSED OR HOPELESS: NOT AT ALL
SUM OF ALL RESPONSES TO PHQ9 QUESTIONS 1 AND 2: 0
2. FEELING DOWN, DEPRESSED OR HOPELESS: NOT AT ALL

## 2025-08-01 ASSESSMENT — LIFESTYLE VARIABLES
HOW OFTEN DURING THE LAST YEAR HAVE YOU HAD A FEELING OF GUILT OR REMORSE AFTER DRINKING: NEVER
AUDIT-C TOTAL SCORE: 1
AUDIT TOTAL SCORE: 1
HOW OFTEN DO YOU HAVE A DRINK CONTAINING ALCOHOL: MONTHLY OR LESS
HOW OFTEN DURING THE LAST YEAR HAVE YOU BEEN UNABLE TO REMEMBER WHAT HAPPENED THE NIGHT BEFORE BECAUSE YOU HAD BEEN DRINKING: NEVER
HOW MANY STANDARD DRINKS CONTAINING ALCOHOL DO YOU HAVE ON A TYPICAL DAY: 1 OR 2
HOW OFTEN DO YOU HAVE SIX OR MORE DRINKS ON ONE OCCASION: NEVER
HOW OFTEN DURING THE LAST YEAR HAVE YOU FOUND THAT YOU WERE NOT ABLE TO STOP DRINKING ONCE YOU HAD STARTED: NEVER
HOW OFTEN DURING THE LAST YEAR HAVE YOU NEEDED AN ALCOHOLIC DRINK FIRST THING IN THE MORNING TO GET YOURSELF GOING AFTER A NIGHT OF HEAVY DRINKING: NEVER
HOW OFTEN DURING THE LAST YEAR HAVE YOU FAILED TO DO WHAT WAS NORMALLY EXPECTED FROM YOU BECAUSE OF DRINKING: NEVER
SKIP TO QUESTIONS 9-10: 1
HAVE YOU OR SOMEONE ELSE BEEN INJURED AS A RESULT OF YOUR DRINKING: NO
HAS A RELATIVE, FRIEND, DOCTOR, OR ANOTHER HEALTH PROFESSIONAL EXPRESSED CONCERN ABOUT YOUR DRINKING OR SUGGESTED YOU CUT DOWN: NO

## 2025-08-01 ASSESSMENT — SLEEP AND FATIGUE QUESTIONNAIRES
SITING INACTIVE IN A PUBLIC PLACE LIKE A CLASS ROOM OR A MOVIE THEATER: WOULD NEVER DOZE
HOW LIKELY ARE YOU TO NOD OFF OR FALL ASLEEP WHEN YOU ARE A PASSENGER IN A CAR FOR AN HOUR WITHOUT A BREAK: WOULD NEVER DOZE
HOW LIKELY ARE YOU TO NOD OFF OR FALL ASLEEP WHILE LYING DOWN TO REST IN THE AFTERNOON WHEN CIRCUMSTANCES PERMIT: SLIGHT CHANCE OF DOZING
HOW LIKELY ARE YOU TO NOD OFF OR FALL ASLEEP WHILE SITTING AND TALKING TO SOMEONE: WOULD NEVER DOZE
ESS-CHAD TOTAL SCORE: 3
HOW LIKELY ARE YOU TO NOD OFF OR FALL ASLEEP WHILE WATCHING TV: SLIGHT CHANCE OF DOZING
HOW LIKELY ARE YOU TO NOD OFF OR FALL ASLEEP WHILE SITTING AND READING: SLIGHT CHANCE OF DOZING
HOW LIKELY ARE YOU TO NOD OFF OR FALL ASLEEP IN A CAR, WHILE STOPPED FOR A FEW MINUTES IN TRAFFIC: WOULD NEVER DOZE
HOW LIKELY ARE YOU TO NOD OFF OR FALL ASLEEP WHILE SITTING QUIETLY AFTER LUNCH WITHOUT ALCOHOL: WOULD NEVER DOZE

## 2025-08-01 ASSESSMENT — COLUMBIA-SUICIDE SEVERITY RATING SCALE - C-SSRS
2. HAVE YOU ACTUALLY HAD ANY THOUGHTS OF KILLING YOURSELF?: NO
1. IN THE PAST MONTH, HAVE YOU WISHED YOU WERE DEAD OR WISHED YOU COULD GO TO SLEEP AND NOT WAKE UP?: NO
6. HAVE YOU EVER DONE ANYTHING, STARTED TO DO ANYTHING, OR PREPARED TO DO ANYTHING TO END YOUR LIFE?: NO

## 2025-08-01 ASSESSMENT — PAIN SCALES - GENERAL
PAINLEVEL_OUTOF10: 0-NO PAIN
PAINLEVEL_OUTOF10: 0-NO PAIN

## 2025-08-01 NOTE — PATIENT INSTRUCTIONS
Try flomax once a day  Follow up with urology  Consider cranberry supplement daily  Make sure to drink plenty of fluids  Wipe from front to back   Make sure to urinate before and after intercourse  Avoid fragranced shower gels and soaps    We will let you know about culture results  Continue Losartan 25 mg once a day  Start Lexapro once a day (it is a low dose)

## 2025-08-01 NOTE — PROGRESS NOTES
Subjective   Patient ID: Tiffanie Burdick is a 40 y.o. female who presents for UTI (Burning and urgency with urination.).    Presents today with concerns for burning on urination.  She reports this is intermittent and has been going on for 1 to 2 months.  She states sometimes she can take Azo and cranberry supplement which resolves the symptoms and other times it is not effective.  She has been to urgent care twice over the past few months with antibiotics prescribed and then called later saying her culture was negative and she did not need to take the antibiotics.  Urine dip in the office today is positive for leukocytes and blood.  She does have a history of a kidney stone.  Will send urine for culture.  Recommend follow-up with urology.  She agreeable to this plan.  She denies nausea or vomiting.  She denies flank pain or elevated temperature.  She also has been having an increase in her anxiety lately.  She has used sertraline in the past which helped her anxiety however she had sexual side effects side effects that she was not happy with.  After discussion she is agreeable to try Lexapro to see if low-dose Lexapro will improve her anxiety without causing her any side effects.  * This note was partially generated using the Dragon Voice recognition system. There may be some incorrect wording, spelling and/or spelling errors or punctuation errors that were not corrected prior to committing the note to the medical record.*         Review of Systems   Constitutional:  Negative for activity change and appetite change.   HENT:  Negative for congestion, ear pain, hearing loss, sinus pain and sore throat.    Eyes:  Negative for photophobia, discharge and visual disturbance.   Respiratory:  Negative for cough, shortness of breath and wheezing.    Cardiovascular:  Negative for chest pain, palpitations and leg swelling.   Gastrointestinal:  Negative for abdominal pain, blood in stool, constipation and diarrhea.   Endocrine:  "Negative for cold intolerance, heat intolerance and polyuria.   Genitourinary:  Negative for dysuria and hematuria.   Musculoskeletal:  Negative for arthralgias, back pain and joint swelling.   Skin:  Negative for rash.   Allergic/Immunologic: Negative for environmental allergies and food allergies.   Neurological:  Negative for dizziness, tremors, syncope, weakness and headaches.   Hematological:  Negative for adenopathy. Does not bruise/bleed easily.   Psychiatric/Behavioral:  Negative for agitation and suicidal ideas. The patient is not nervous/anxious.        Objective   /76 (BP Location: Right arm, Patient Position: Sitting)   Pulse 90   Temp 36.9 °C (98.4 °F) (Temporal)   Ht 1.702 m (5' 7\")   Wt 87 kg (191 lb 12.8 oz)   SpO2 97%   BMI 30.04 kg/m²     Physical Exam  Vitals reviewed.   Constitutional:       General: She is not in acute distress.     Appearance: Normal appearance. She is obese.   HENT:      Head: Normocephalic.      Right Ear: Tympanic membrane normal.      Left Ear: Tympanic membrane normal.      Nose: Nose normal.      Mouth/Throat:      Mouth: Mucous membranes are moist.     Eyes:      Conjunctiva/sclera: Conjunctivae normal.      Pupils: Pupils are equal, round, and reactive to light.       Cardiovascular:      Rate and Rhythm: Normal rate and regular rhythm.      Pulses: Normal pulses.      Heart sounds: Normal heart sounds.   Pulmonary:      Effort: Pulmonary effort is normal.      Breath sounds: Normal breath sounds.   Abdominal:      General: Bowel sounds are normal.      Palpations: Abdomen is soft.     Musculoskeletal:         General: Normal range of motion.     Skin:     General: Skin is warm and dry.      Capillary Refill: Capillary refill takes less than 2 seconds.     Neurological:      Mental Status: She is alert and oriented to person, place, and time.     Psychiatric:         Mood and Affect: Mood normal.         Speech: Speech normal.         Assessment/Plan "   Problem List Items Addressed This Visit    None  Visit Diagnoses         Codes      Kidney stone    -  Primary N20.0    Relevant Medications    tamsulosin (Flomax) 0.4 mg 24 hr capsule      Benign essential hypertension     I10    Relevant Medications    losartan (Cozaar) 25 mg tablet    tamsulosin (Flomax) 0.4 mg 24 hr capsule    Other Relevant Orders    Follow Up In Primary Care - Established      Dysuria     R30.0    Relevant Orders    POCT UA Automated manually resulted (Completed)    Urine Culture    Microscopic Only, Urine      Leukocytosis, unspecified type     D72.829    Relevant Orders    POCT UA Automated manually resulted (Completed)    Urine Culture    Microscopic Only, Urine      Anxiety     F41.9    Relevant Medications    escitalopram (Lexapro) 5 mg tablet    Other Relevant Orders    Follow Up In Primary Care - Established

## 2025-08-01 NOTE — PATIENT INSTRUCTIONS
[facility] Sleep Medicine  [facility] ONE  [facility] ONE  [address] [place] [address]       Thank you for coming to the Sleep Medicine Clinic today! Your sleep medicine provider today was: REBECCA Garcia Below is a summary of your treatment plan, patient education, other important information, and our contact numbers.    Dear Ms Tiffanie Burdick       Your Sleep Provider Today: REBECCA Garcia  Your Primary Care Physician: REBECCA Lewis       Thank you for visiting  Sleep Medicine Clinic !     1. We will proceed with a HOME sleep study to check your risk of sleep apnea. The lab will call you and schedule it for you.     2. Please do not drive when you are sleepy and continue practicing the sleep hygiene as discussed in clinic.    3. FOR QUESTIONS AND CONCERNS:   a) :  To schedule, cancel, or reschedule SLEEP STUDY appointments, please call [phone]  b): Please call my office with issues or questions: [phone] ([place]); [phone] ([facility]); [phone] ([facility])    If you have a CPAP or BiPAP machine at home, please bring the unit and all accessories including the power cord to your appointments unless I tell you otherwise. Please have knowledge of the DME company you worked with to receive your PAP device. If you have copies of any previous sleep testing completed outside of , please bring with you to clinic as well. This information will make our visits more productive.     If you are new to CPAP or BiPAP, please note the minimum usage insurance requires to continuing coverage for the equipment as noted by your DME company. Please discuss equipment issues (PAP unit, mask fit, humidification, etc.) with your DME company first.       In the event that you are running more than 10 minutes late to your appointment, I will kindly ask you to reschedule. Thanks.      TREATMENT PLAN     - Do the following testing: at-home  sleep study    Follow-up Appointment:   Follow-up in 3 months    PATIENT EDUCATION     OBSTRUCTIVE SLEEP APNEA (HOSSEIN) is a sleep disorder where your upper airway muscles relax during sleep and the airway intermittently and repetitively narrows and collapses leading to partially blocked airway (hypopnea) or completely blocked airway (apnea) which, in turn, can disrupt breathing in sleep, lower oxygen levels while you sleep and cause night time wakings. Because both apnea and hypopnea may cause higher carbon dioxide or low oxygen levels, untreated HOSSEIN can lead to heart arrhythmia, elevation of blood pressure, and make it harder for the body to consolidate memory and facilitate metabolism (leading to higher blood sugars at night). Frequent partial arousals occur during sleep resulting in sleep deprivation and daytime sleepiness. HOSSEIN is associated with an increased risk of cardiovascular disease, stroke, hypertension, and insulin resistance. Moreover, untreated HOSSEIN with excessive daytime sleepiness can increase the risk of motor vehicular accidents.    Below are conservative strategies for HOSSEIN regardless of HOSSEIN severity are:   Positional therapy - Avoid sleeping on your back.   Healthy diet and regular exercise to optimize weight is highly encouraged.   Avoid alcohol late in the evening and sedative-hypnotics as these substances can make sleep apnea worse.   Improve breathing through the nose with intranasal steroid spray, saline rinse, or antihistamines    Safety: Avoid driving vehicle and operating heavy equipment while sleepy. Drowsy driving may lead to life-threatening motor vehicle accidents. A person driving while sleepy is 5 times more likely to have an accident. If you feel sleepy, pull over and take a short power nap (sleep for less than 30 minutes). Otherwise, ask somebody to drive you.    Treatment options for sleep apnea include weight management, positional therapy, Positive Airway Therapy (PAP) therapy,  oral appliance therapy, hypoglossal nerve stimulator (Inspire) and select airway surgeries.    Sleep Testing for sleep apnea: The best and ideal way to check out if you have sleep apnea is to do an overnight sleep study in the sleep laboratory. Alternatively, a home sleep apnea test can also be done depending on your insurance and risk factors.     If you are having a home sleep apnea test, kindly allot 1 hour during pickup of the testing kit as you will have to complete paperwork and listen to the sleep technician for in-person on-the-spot demonstration and instructions on how to hook up the testing kit at home. Do the test for 1 day and start off with sleeping on your back. If you sleep on your side in the middle of night or you have always been a side or stomach-sleeper, it is ok as long as you have some time on your back and off-back.     If you are having an overnight in-lab sleep study, please make sure to bring toiletries, a comfy pillow, additional warm blankets, and any nighttime medications (include as-needed inhaler, pain pill, etc) that you may regularly take. Also, be sure to eat dinner before you arrive as we generally do not provide meals inside the sleep testing center. Lastly, in order to fall asleep faster in the sleep testing center, we advise patients to wake up 2 hours earlier on the morning of scheduled testing and avoid napping 2 days prior testing. Sometimes, your sleep provider may prescribe a sleep aid to be taken at lights out in the sleep testing center. If you are taking a sleep aid, consider having somebody pick you up after the sleep testing.    Overnight sleep studies may be scheduled on a weekday or weekend. We also perform daytime testing for shift workers on a case-by-case basis.    Once you have booked an appointment to do the sleep study, please contact my office for follow-up visit to discuss results.    On the other hand, if you have any of the following, please consider  calling the sleep testing center to RESCHEDULE your sleep study appointment:  If you tested positive for COVID within 10 days of your sleep study appointment.  If you were exposed to somebody who was confirmed for COVID within 10 days of your sleep study appointment and now you are having symptoms of possible COVID  If you have fever>100F or any acute symptoms that you think will lead to poor sleep during testing (e.g. new or worsening stuffy nose not relieved by steroid nasal spray)  If you have traveled domestically or internationally in the last month and now you are having symptoms of possible COVID    You can also go to the following EDUCATION WEBSITES for further information:   American Academy of Sleep Medicine http://sleepeducation.org  National Sleep Foundation: https://sleepfoundation.org  American Sleep Apnea Association: https://www.sleepapnea.org (for patients with sleep apnea)  Narcolepsy Network: https://www.narcolepsynetwork.org (for patients with narcolepsy)  [employer]: https://www.[employer] Inccolepsy.org (for patients with narcolepsy)  Hypersomnia Foundation: https://www.hypersomniafoundation.org (for patients with idiopathic hypersomnia)  RLS foundation: https://www.rls.org (for patients with restless leg syndrome)    IMPORTANT INFORMATION     Call 911 for medical emergencies.  Our offices are generally open from Monday-Friday, 8 am - 5 pm.   There are no supporting services by either the sleep doctors or their staff on weekends and Holidays, or after 5 PM on weekdays.   If you need to get in touch with me, you may either call my office number or you can use [employer].  If a referral for a test, for CPAP, or for another specialist was made, and you have not heard about scheduling this within a week, please call scheduling at [phone] ([phone]).  If you are unable to make your appointment for clinic or an overnight study, kindly call the office or sleep testing center at least 48 hours in  advance to cancel and reschedule.  If you are on CPAP, please bring your device's card and/or the device to each clinic appointment.   In case of problems with PAP machine or mask interface, please contact your DME (Durable Medical Equipment) company first. DME is the company who provides you the machine and/or PAP supplies.       PRESCRIPTIONS     We require 7 days advanced notice for prescription refills. If we do not receive the request in this time, we cannot guarantee that your medication will be refilled in time.    IMPORTANT PHONE NUMBERS     Sleep Medicine Clinic Fax: [phone]  Appointments (for Pediatric Sleep Clinic): [phone] - option 1  Appointments (for Adult Sleep Clinic): [phone] - option 2  Appointments (For Sleep Studies): [phone] - option 3  Behavioral Sleep Medicine: [phone]  Sleep Surgery: [phone]  Nutrition Service: [phone]  Weight management clinics with endocrinology: [phone]  Bariatric Services: [phone] (includes weight loss medications and weight loss surgery)  [facility]: [phone] (offers holistic approaches to weight management)  ENT (Otolaryngology): [phone]  Headache Clinic (Neurology): [phone]  Neurology: [phone]  Psychiatry: [phone]  Pulmonary Function Testing (PFT) Center: [phone]  Pulmonary Medicine: [phone]  Medical Service Company ([employer]): [phone]      OUR SLEEP TESTING LOCATIONS     Our team will contact you to schedule your sleep study, however, you can contact us as follow:  Main Phone Line (scheduling only): [phone]), option 3  Adult and Pediatric Locations  [facility] (6 years and older): [facility] - 4th floor ([address] [place] [address]) After hours line: [phone]  [facility] (Main campus: All ages): [facility], 6th floor. After hours line: [phone]  [facility] (5 years and  "older; younger considered on case-by-case basis): [address]; [address], [address]. Scheduling  After hours line: [phone]   [place] (6 years and older): [address]; [address]; [address]   [place] (6 years and older): [address] [facility], [address]  After hours line: [phone]   [place] (13 years and older) in [place]: [address], 2nd floor  After hours line: [phone]   [place] (13 year and older): [address]  After hours line: [phone]     Adult Only Locations:   [place] (18 years and older): [address] [facility], 2nd floor   [place] (18 years and older): [address] [facility]; 4th floor  After hours line: [phone]  [facility] (18 years and older) at [place]: [address] [place] [address]  After hours line: [phone]     CONTACTING YOUR SLEEP MEDICINE PROVIDER AND SLEEP TEAM      For issues with your machine or mask interface, please call your DME provider first. DME stands for durable medical company. DME is the company who provides you the machine and/or PAP supplies / accessories.   To schedule, cancel, or reschedule SLEEP STUDY APPOINTMENTS, please call the Main Phone Line at [phone] - option 3.   To schedule, cancel, or reschedule CLINIC APPOINTMENTS, you can do it in \"[employer]\", call [phone] to speak with my  (Judy Giles), or call the Main Phone Line at [phone] - option 2  For CLINICAL QUESTIONS or MEDICATION REFILLS, please call direct line for Adult Sleep Nurses at [phone].   Lastly, you can also send a message directly to your provider through \"My Chart\", which is the email service through your  Records Account: [email or web address]       Here at [facility], we wish you a restful sleep!   "

## 2025-08-03 LAB
BACTERIA #/AREA URNS HPF: ABNORMAL /HPF
BACTERIA UR CULT: ABNORMAL
HYALINE CASTS #/AREA URNS LPF: ABNORMAL /LPF
RBC #/AREA URNS HPF: ABNORMAL /HPF
SERVICE CMNT-IMP: ABNORMAL
SQUAMOUS #/AREA URNS HPF: ABNORMAL /HPF
WBC #/AREA URNS HPF: ABNORMAL /HPF

## 2025-08-05 ENCOUNTER — RESULTS FOLLOW-UP (OUTPATIENT)
Dept: PRIMARY CARE | Facility: CLINIC | Age: 40
End: 2025-08-05
Payer: COMMERCIAL

## 2025-08-05 DIAGNOSIS — N30.01 ACUTE CYSTITIS WITH HEMATURIA: Primary | ICD-10-CM

## 2025-08-05 RX ORDER — SULFAMETHOXAZOLE AND TRIMETHOPRIM 800; 160 MG/1; MG/1
1 TABLET ORAL 2 TIMES DAILY
Qty: 6 TABLET | Refills: 0 | Status: SHIPPED | OUTPATIENT
Start: 2025-08-05 | End: 2025-08-08

## 2025-08-22 ENCOUNTER — APPOINTMENT (OUTPATIENT)
Dept: SURGERY | Facility: CLINIC | Age: 40
End: 2025-08-22
Payer: COMMERCIAL

## 2025-08-22 VITALS
WEIGHT: 191.3 LBS | DIASTOLIC BLOOD PRESSURE: 80 MMHG | SYSTOLIC BLOOD PRESSURE: 120 MMHG | RESPIRATION RATE: 19 BRPM | BODY MASS INDEX: 30.02 KG/M2 | HEIGHT: 67 IN | OXYGEN SATURATION: 97 % | HEART RATE: 70 BPM

## 2025-08-22 DIAGNOSIS — Z90.3 INTESTINAL MALABSORPTION FOLLOWING GASTRECTOMY (HHS-HCC): Primary | ICD-10-CM

## 2025-08-22 DIAGNOSIS — E66.3 OVERWEIGHT (BMI 25.0-29.9): ICD-10-CM

## 2025-08-22 DIAGNOSIS — Z98.84 S/P LAPAROSCOPIC SLEEVE GASTRECTOMY: ICD-10-CM

## 2025-08-22 DIAGNOSIS — G47.33 OSA (OBSTRUCTIVE SLEEP APNEA): ICD-10-CM

## 2025-08-22 DIAGNOSIS — I10 PRIMARY HYPERTENSION: ICD-10-CM

## 2025-08-22 DIAGNOSIS — E03.9 HYPOTHYROIDISM, ADULT: ICD-10-CM

## 2025-08-22 DIAGNOSIS — K91.2 INTESTINAL MALABSORPTION FOLLOWING GASTRECTOMY (HHS-HCC): Primary | ICD-10-CM

## 2025-08-22 PROCEDURE — 3079F DIAST BP 80-89 MM HG: CPT | Performed by: SURGERY

## 2025-08-22 PROCEDURE — 3074F SYST BP LT 130 MM HG: CPT | Performed by: SURGERY

## 2025-08-22 PROCEDURE — 1036F TOBACCO NON-USER: CPT | Performed by: SURGERY

## 2025-08-22 PROCEDURE — 99214 OFFICE O/P EST MOD 30 MIN: CPT | Performed by: SURGERY

## 2025-08-22 PROCEDURE — 3008F BODY MASS INDEX DOCD: CPT | Performed by: SURGERY

## 2025-08-22 RX ORDER — HYDROXYZINE PAMOATE 25 MG/1
CAPSULE ORAL
COMMUNITY
Start: 2025-08-08

## 2025-08-22 ASSESSMENT — PAIN SCALES - GENERAL: PAINLEVEL_OUTOF10: 6

## 2025-08-24 LAB
25(OH)D3+25(OH)D2 SERPL-MCNC: 41 NG/ML (ref 30–100)
BASOPHILS # BLD AUTO: 77 CELLS/UL (ref 0–200)
BASOPHILS NFR BLD AUTO: 1.1 %
CHOLEST SERPL-MCNC: 157 MG/DL
CHOLEST/HDLC SERPL: 2.9 (CALC)
COPPER BLD-MCNC: NORMAL UG/DL
EOSINOPHIL # BLD AUTO: 91 CELLS/UL (ref 15–500)
EOSINOPHIL NFR BLD AUTO: 1.3 %
ERYTHROCYTE [DISTWIDTH] IN BLOOD BY AUTOMATED COUNT: 11.9 % (ref 11–15)
EST. AVERAGE GLUCOSE BLD GHB EST-MCNC: 91 MG/DL
EST. AVERAGE GLUCOSE BLD GHB EST-SCNC: 5 MMOL/L
FERRITIN SERPL-MCNC: 111 NG/ML (ref 16–232)
FOLATE SERPL-MCNC: 12.6 NG/ML
HBA1C MFR BLD: 4.8 %
HCT VFR BLD AUTO: 42.2 % (ref 35–45)
HDLC SERPL-MCNC: 54 MG/DL
HGB BLD-MCNC: 14.4 G/DL (ref 11.7–15.5)
IRON SATN MFR SERPL: 36 % (CALC) (ref 16–45)
IRON SERPL-MCNC: 93 MCG/DL (ref 40–190)
LDLC SERPL CALC-MCNC: 82 MG/DL (CALC)
LYMPHOCYTES # BLD AUTO: 1918 CELLS/UL (ref 850–3900)
LYMPHOCYTES NFR BLD AUTO: 27.4 %
MCH RBC QN AUTO: 32.7 PG (ref 27–33)
MCHC RBC AUTO-ENTMCNC: 34.1 G/DL (ref 32–36)
MCV RBC AUTO: 95.9 FL (ref 80–100)
MONOCYTES # BLD AUTO: 427 CELLS/UL (ref 200–950)
MONOCYTES NFR BLD AUTO: 6.1 %
NEUTROPHILS # BLD AUTO: 4487 CELLS/UL (ref 1500–7800)
NEUTROPHILS NFR BLD AUTO: 64.1 %
NONHDLC SERPL-MCNC: 103 MG/DL (CALC)
PLATELET # BLD AUTO: 272 THOUSAND/UL (ref 140–400)
PMV BLD REES-ECKER: 8.8 FL (ref 7.5–12.5)
PTH-INTACT SERPL-MCNC: 52 PG/ML (ref 16–77)
RBC # BLD AUTO: 4.4 MILLION/UL (ref 3.8–5.1)
TIBC SERPL-MCNC: 255 MCG/DL (CALC) (ref 250–450)
TRIGL SERPL-MCNC: 116 MG/DL
VIT B1 BLD-SCNC: NORMAL NMOL/L
VIT B12 SERPL-MCNC: 412 PG/ML (ref 200–1100)
WBC # BLD AUTO: 7 THOUSAND/UL (ref 3.8–10.8)
ZINC SERPL-MCNC: 64 MCG/DL (ref 60–130)

## 2025-08-26 LAB
25(OH)D3+25(OH)D2 SERPL-MCNC: 41 NG/ML (ref 30–100)
BASOPHILS # BLD AUTO: 77 CELLS/UL (ref 0–200)
BASOPHILS NFR BLD AUTO: 1.1 %
CHOLEST SERPL-MCNC: 157 MG/DL
CHOLEST/HDLC SERPL: 2.9 (CALC)
COPPER BLD-MCNC: 80 MCG/DL
EOSINOPHIL # BLD AUTO: 91 CELLS/UL (ref 15–500)
EOSINOPHIL NFR BLD AUTO: 1.3 %
ERYTHROCYTE [DISTWIDTH] IN BLOOD BY AUTOMATED COUNT: 11.9 % (ref 11–15)
EST. AVERAGE GLUCOSE BLD GHB EST-MCNC: 91 MG/DL
EST. AVERAGE GLUCOSE BLD GHB EST-SCNC: 5 MMOL/L
FERRITIN SERPL-MCNC: 111 NG/ML (ref 16–232)
FOLATE SERPL-MCNC: 12.6 NG/ML
HBA1C MFR BLD: 4.8 %
HCT VFR BLD AUTO: 42.2 % (ref 35–45)
HDLC SERPL-MCNC: 54 MG/DL
HGB BLD-MCNC: 14.4 G/DL (ref 11.7–15.5)
IRON SATN MFR SERPL: 36 % (CALC) (ref 16–45)
IRON SERPL-MCNC: 93 MCG/DL (ref 40–190)
LDLC SERPL CALC-MCNC: 82 MG/DL (CALC)
LYMPHOCYTES # BLD AUTO: 1918 CELLS/UL (ref 850–3900)
LYMPHOCYTES NFR BLD AUTO: 27.4 %
MCH RBC QN AUTO: 32.7 PG (ref 27–33)
MCHC RBC AUTO-ENTMCNC: 34.1 G/DL (ref 32–36)
MCV RBC AUTO: 95.9 FL (ref 80–100)
MONOCYTES # BLD AUTO: 427 CELLS/UL (ref 200–950)
MONOCYTES NFR BLD AUTO: 6.1 %
NEUTROPHILS # BLD AUTO: 4487 CELLS/UL (ref 1500–7800)
NEUTROPHILS NFR BLD AUTO: 64.1 %
NONHDLC SERPL-MCNC: 103 MG/DL (CALC)
PLATELET # BLD AUTO: 272 THOUSAND/UL (ref 140–400)
PMV BLD REES-ECKER: 8.8 FL (ref 7.5–12.5)
PTH-INTACT SERPL-MCNC: 52 PG/ML (ref 16–77)
RBC # BLD AUTO: 4.4 MILLION/UL (ref 3.8–5.1)
TIBC SERPL-MCNC: 255 MCG/DL (CALC) (ref 250–450)
TRIGL SERPL-MCNC: 116 MG/DL
VIT B1 BLD-SCNC: NORMAL NMOL/L
VIT B12 SERPL-MCNC: 412 PG/ML (ref 200–1100)
WBC # BLD AUTO: 7 THOUSAND/UL (ref 3.8–10.8)
ZINC SERPL-MCNC: 64 MCG/DL (ref 60–130)

## 2025-08-27 ENCOUNTER — CLINICAL SUPPORT (OUTPATIENT)
Dept: PRIMARY CARE | Facility: CLINIC | Age: 40
End: 2025-08-27
Payer: COMMERCIAL

## 2025-08-27 ENCOUNTER — TELEPHONE (OUTPATIENT)
Dept: PRIMARY CARE | Facility: CLINIC | Age: 40
End: 2025-08-27

## 2025-08-27 DIAGNOSIS — R30.0 DYSURIA: ICD-10-CM

## 2025-08-27 LAB
POC APPEARANCE, URINE: ABNORMAL
POC BILIRUBIN, URINE: NEGATIVE
POC BLOOD, URINE: ABNORMAL
POC COLOR, URINE: YELLOW
POC GLUCOSE, URINE: NEGATIVE MG/DL
POC KETONES, URINE: NEGATIVE MG/DL
POC LEUKOCYTES, URINE: ABNORMAL
POC NITRITE,URINE: POSITIVE
POC PH, URINE: 8 PH
POC PROTEIN, URINE: NEGATIVE MG/DL
POC SPECIFIC GRAVITY, URINE: 1.02
POC UROBILINOGEN, URINE: 0.2 EU/DL

## 2025-08-27 PROCEDURE — 81003 URINALYSIS AUTO W/O SCOPE: CPT | Performed by: NURSE PRACTITIONER

## 2025-08-29 ENCOUNTER — TELEPHONE (OUTPATIENT)
Dept: PRIMARY CARE | Facility: CLINIC | Age: 40
End: 2025-08-29
Payer: COMMERCIAL

## 2025-08-29 DIAGNOSIS — N30.01 ACUTE CYSTITIS WITH HEMATURIA: Primary | ICD-10-CM

## 2025-08-29 RX ORDER — SULFAMETHOXAZOLE AND TRIMETHOPRIM 800; 160 MG/1; MG/1
1 TABLET ORAL 2 TIMES DAILY
Qty: 6 TABLET | Refills: 0 | Status: SHIPPED | OUTPATIENT
Start: 2025-08-29 | End: 2025-09-01

## 2025-08-30 LAB
AMORPH SED URNS QL MICRO: ABNORMAL /HPF
APPEARANCE UR: ABNORMAL
BACTERIA #/AREA URNS HPF: ABNORMAL /HPF
BACTERIA UR CULT: ABNORMAL
BACTERIA UR CULT: ABNORMAL
BILIRUB UR QL STRIP: NEGATIVE
COLOR UR: YELLOW
GLUCOSE UR QL STRIP: NEGATIVE
HGB UR QL STRIP: ABNORMAL
HYALINE CASTS #/AREA URNS LPF: ABNORMAL /LPF
KETONES UR QL STRIP: NEGATIVE
LEUKOCYTE ESTERASE UR QL STRIP: ABNORMAL
NITRITE UR QL STRIP: POSITIVE
PH UR STRIP: 8 [PH] (ref 5–8)
PROT UR QL STRIP: ABNORMAL
RBC #/AREA URNS HPF: ABNORMAL /HPF
SERVICE CMNT-IMP: ABNORMAL
SP GR UR STRIP: 1.01 (ref 1–1.03)
SQUAMOUS #/AREA URNS HPF: ABNORMAL /HPF
WBC #/AREA URNS HPF: ABNORMAL /HPF

## 2025-09-02 ENCOUNTER — RESULTS FOLLOW-UP (OUTPATIENT)
Dept: PRIMARY CARE | Facility: CLINIC | Age: 40
End: 2025-09-02
Payer: COMMERCIAL

## 2025-09-10 ENCOUNTER — APPOINTMENT (OUTPATIENT)
Dept: PRIMARY CARE | Facility: CLINIC | Age: 40
End: 2025-09-10
Payer: COMMERCIAL

## 2025-09-19 ENCOUNTER — APPOINTMENT (OUTPATIENT)
Dept: SURGERY | Facility: CLINIC | Age: 40
End: 2025-09-19
Payer: COMMERCIAL

## (undated) DEVICE — SYRINGE, 60 CC, LUER LOCK, MONOJECT, W/O CAP, LF

## (undated) DEVICE — ACCESS SYS, KII SHIELDED BLADED, Z-THREAD, 5X100CM

## (undated) DEVICE — TROCAR, BLADELESS, THREADED, 15MM

## (undated) DEVICE — Device

## (undated) DEVICE — APPLICATOR, CHLORAPREP, W/ORANGE TINT, 26ML

## (undated) DEVICE — CAUTERY, PENCIL, PUSH BUTTON, SMOKE EVAC, 70MM

## (undated) DEVICE — BAG, DECANTER

## (undated) DEVICE — SUTURE, MONOCRYL, 4-0, 18 IN, PS2, UNDYED

## (undated) DEVICE — SUTURE, VICRYL, 0, 27 IN, UR-6, VIOLET

## (undated) DEVICE — ASSEMBLY, STRYKER FLOW 2, SUCTION IRRIGATOR, WITH TIP

## (undated) DEVICE — STAPLER, LINEAR ENDO RELOAD, 60MM, BLUE, DISP

## (undated) DEVICE — CARE KIT, LAPAROSCOPIC, ADVANCED

## (undated) DEVICE — STAPLER, ECHELON FLEX GST, POWERED PLUS, 60MM X 34CM, STANDARD

## (undated) DEVICE — STAPLELINE, BIOABSORB, SEAMGUARD, 60

## (undated) DEVICE — STAPLER,  LINEAR RELOAD, 60MM, WHITE, DISP

## (undated) DEVICE — SHEARS, HARMONIC CURVED XLONG 45CM

## (undated) DEVICE — SYSTEM, GASTRECTOMY SLEEVE, 36FR W/O BULB, VISIGI 3D

## (undated) DEVICE — SUTURE, STRATAFIX, SPIRAL, 2-0 SH, PDS PLUS VIOLET

## (undated) DEVICE — DRAIN, PENROSE, 1/4 IN X 18 IN, STERILE, LF

## (undated) DEVICE — NEEDLE, SPINAL, QUINCKE, 18 G X 3.5 IN, PINK HUB

## (undated) DEVICE — SCISSOR, MINI ENDO CUT, TIPS, DISP

## (undated) DEVICE — ADHESIVE, SKIN, DERMABOND ADVANCED, 15CM, PEN-STYLE

## (undated) DEVICE — TUBE SET, PNEUMOCLEAR, SMOKE EVACU, HIGH-FLOW

## (undated) DEVICE — CLIP, ENDO APPLIER LIGAMAX 5MM

## (undated) DEVICE — NEEDLE, INSUFFLATION, 13GAX120MM, DISP

## (undated) DEVICE — SOLUTION, INJECTION, USP, SODIUM CHLORIDE 0.9%, .9, NACL, 1000 ML, BAG

## (undated) DEVICE — DRAPE, INSTRUMENT, W/POUCH, STERI DRAPE, 9 5/8 X 18 LONG

## (undated) DEVICE — SYSTEM, FIOS FIRST ENTRY, 5 X 100MM, KII ADVANCED FIXATION

## (undated) DEVICE — SYRINGE, 20 CC, LUER LOCK, MONOJECT, W/O CAP, LF

## (undated) DEVICE — STAPLER,  LINEAR ENDO 60MM RELOAD, GREEN, DISP